# Patient Record
Sex: MALE | Race: WHITE | NOT HISPANIC OR LATINO | Employment: FULL TIME | URBAN - METROPOLITAN AREA
[De-identification: names, ages, dates, MRNs, and addresses within clinical notes are randomized per-mention and may not be internally consistent; named-entity substitution may affect disease eponyms.]

---

## 2017-01-02 DIAGNOSIS — N18.9 CKD (CHRONIC KIDNEY DISEASE): Primary | ICD-10-CM

## 2017-01-02 NOTE — NURSING NOTE
Labs entered per 2A Protocol.   Labs entered for 4 month follow up CKD   Last OV: 8.25.2016    Char Haas LPN

## 2017-01-18 ENCOUNTER — OFFICE VISIT (OUTPATIENT)
Dept: NEPHROLOGY | Facility: CLINIC | Age: 52
End: 2017-01-18
Attending: INTERNAL MEDICINE
Payer: COMMERCIAL

## 2017-01-18 VITALS
BODY MASS INDEX: 29.18 KG/M2 | HEIGHT: 74 IN | TEMPERATURE: 98.3 F | OXYGEN SATURATION: 98 % | WEIGHT: 227.4 LBS | SYSTOLIC BLOOD PRESSURE: 121 MMHG | DIASTOLIC BLOOD PRESSURE: 80 MMHG | HEART RATE: 80 BPM

## 2017-01-18 DIAGNOSIS — Z76.82 ORGAN TRANSPLANT CANDIDATE: ICD-10-CM

## 2017-01-18 DIAGNOSIS — E87.20 ACIDOSIS: Primary | ICD-10-CM

## 2017-01-18 DIAGNOSIS — N18.9 CKD (CHRONIC KIDNEY DISEASE): ICD-10-CM

## 2017-01-18 DIAGNOSIS — N18.5 CKD (CHRONIC KIDNEY DISEASE) STAGE 5, GFR LESS THAN 15 ML/MIN (H): ICD-10-CM

## 2017-01-18 DIAGNOSIS — N25.81 SECONDARY RENAL HYPERPARATHYROIDISM (H): ICD-10-CM

## 2017-01-18 LAB
ALBUMIN SERPL-MCNC: 3.8 G/DL (ref 3.4–5)
ANION GAP SERPL CALCULATED.3IONS-SCNC: 12 MMOL/L (ref 3–14)
BUN SERPL-MCNC: 76 MG/DL (ref 7–30)
CALCIUM SERPL-MCNC: 8.8 MG/DL (ref 8.5–10.1)
CHLORIDE SERPL-SCNC: 110 MMOL/L (ref 94–109)
CO2 SERPL-SCNC: 18 MMOL/L (ref 20–32)
CREAT SERPL-MCNC: 5.49 MG/DL (ref 0.66–1.25)
CREAT UR-MCNC: 57 MG/DL
DEPRECATED CALCIDIOL+CALCIFEROL SERPL-MC: 19 UG/L (ref 20–75)
ERYTHROCYTE [DISTWIDTH] IN BLOOD BY AUTOMATED COUNT: 12.8 % (ref 10–15)
FERRITIN SERPL-MCNC: 274 NG/ML (ref 26–388)
GFR SERPL CREATININE-BSD FRML MDRD: 11 ML/MIN/1.7M2
GLUCOSE SERPL-MCNC: 102 MG/DL (ref 70–99)
HCT VFR BLD AUTO: 31.8 % (ref 40–53)
HGB BLD-MCNC: 11 G/DL (ref 13.3–17.7)
IRON SATN MFR SERPL: 22 % (ref 15–46)
IRON SERPL-MCNC: 68 UG/DL (ref 35–180)
MCH RBC QN AUTO: 31.8 PG (ref 26.5–33)
MCHC RBC AUTO-ENTMCNC: 34.6 G/DL (ref 31.5–36.5)
MCV RBC AUTO: 92 FL (ref 78–100)
PHOSPHATE SERPL-MCNC: 5.5 MG/DL (ref 2.5–4.5)
PLATELET # BLD AUTO: 212 10E9/L (ref 150–450)
POTASSIUM SERPL-SCNC: 5.3 MMOL/L (ref 3.4–5.3)
PROT UR-MCNC: 0.35 G/L
PROT/CREAT 24H UR: 0.61 G/G CR (ref 0–0.2)
PTH-INTACT SERPL-MCNC: 279 PG/ML (ref 12–72)
RBC # BLD AUTO: 3.46 10E12/L (ref 4.4–5.9)
SODIUM SERPL-SCNC: 141 MMOL/L (ref 133–144)
TIBC SERPL-MCNC: 306 UG/DL (ref 240–430)
WBC # BLD AUTO: 6.4 10E9/L (ref 4–11)

## 2017-01-18 PROCEDURE — 82728 ASSAY OF FERRITIN: CPT | Performed by: INTERNAL MEDICINE

## 2017-01-18 PROCEDURE — 83540 ASSAY OF IRON: CPT | Performed by: INTERNAL MEDICINE

## 2017-01-18 PROCEDURE — 83970 ASSAY OF PARATHORMONE: CPT | Performed by: INTERNAL MEDICINE

## 2017-01-18 PROCEDURE — 84156 ASSAY OF PROTEIN URINE: CPT | Performed by: INTERNAL MEDICINE

## 2017-01-18 PROCEDURE — 80069 RENAL FUNCTION PANEL: CPT | Performed by: INTERNAL MEDICINE

## 2017-01-18 PROCEDURE — 82306 VITAMIN D 25 HYDROXY: CPT | Performed by: INTERNAL MEDICINE

## 2017-01-18 PROCEDURE — 83550 IRON BINDING TEST: CPT | Performed by: INTERNAL MEDICINE

## 2017-01-18 PROCEDURE — 85027 COMPLETE CBC AUTOMATED: CPT | Performed by: INTERNAL MEDICINE

## 2017-01-18 PROCEDURE — 99213 OFFICE O/P EST LOW 20 MIN: CPT | Mod: ZF

## 2017-01-18 PROCEDURE — 36415 COLL VENOUS BLD VENIPUNCTURE: CPT | Performed by: INTERNAL MEDICINE

## 2017-01-18 RX ORDER — SODIUM BICARBONATE 650 MG/1
1300 TABLET ORAL 2 TIMES DAILY
Qty: 120 TABLET | Refills: 11 | Status: SHIPPED | OUTPATIENT
Start: 2017-01-18 | End: 2017-05-25

## 2017-01-18 ASSESSMENT — PAIN SCALES - GENERAL: PAINLEVEL: NO PAIN (0)

## 2017-01-18 NOTE — Clinical Note
"1/18/2017      RE: Cesar Horowitzlilibethjavier  [address] [place] [address]       ASSESSMENT AND RECOMMENDATIONS:    51 year old male with history of CKD noted over last few years, now stage 4/5, of unclear etiology. Also has a brother now on dialysis. He has history of gross hematuria a few years ago.    1.  CKD stage 4/5 with minimal proteinuria- not sure if he would be interested in PD or HD, he has not really thought about RRT, and hopes to have donors though it seems he has had almost 30 people , none of them cleared. He has been on waiting list since October 2014, so hoping to 'bide his time'.  - discussed today and he is leaning toward PD (does not like idea of needles or fistula)  - will have him get more education on PD -   2.  Hypertension: at goal, on lisinopril.  3.  History of remote episode of gross hematuria in 2002. ? IgA- though intriguing that his brother and a couple other family members have renal failure.    - he does take fish oil  4.  Anemia in chronic kidney disease; he does not require any erythropoiesis stimulating agents. Hemoglobin >11 mg/dL  5. Secondary hyperparathyroidism: PTH at goal for his CKD stage, on calcitriol and cholecalciferol 2000mg daily- may need to increase dose  6. Vitamin D deficiency- on calcitriol and cholecalciferol, states he did not improve on ergocalciferol  7. Metabolic acidosis- had stopped taking his bicarbonate tabs, will restart given his bicarbonate is quite low.  - return to clinic in 4-25 months, labs mid point to be done    REASON FOR VISIT:      The patient is here for followup on advanced chronic kidney disease, stage 5.     HISTORY OF PRESENT ILLNESS:      He is a pleasant 51 year-old male with CKD previously followed by Dr Taylor. He is fairly healthy other than kidney disease.  It is unclear what the cause of kidney failure is but suspect it may be IgA given that he had a gross hematuria episode in 2002, which was thought to be a \" kidney infection\" " by a doctor in [place], and he was given an antibiotic with resolution of the hematuria.  A while later his Scr was noted to be higher by his primary care physician in [place].   In 2007, he was told that his GFR was around 59, then in 2010, his creatinine was up to 1.8 mg/dl.  He was referred to a nephrologist who told him that he had mild kidney dysfunction.  He did not comprehend the seriousness of this and felt reassured, thus he did not follow closely.   He was started on lisinopril 5 mg for mild hypertension. He did not undergo further workup and does not remember being offered a kidney biopsy.   In November i2011 he was seen by his nephrologist and his serum creatinine was slightly higher in the range of 2 with an estimated GFR of 33. In May 2012, he had a decline of his GFR per him to 29 mL per minute  There was no clear cause for this. He does not take NSAIDs, have  history of kidney stones or any indication of a system or autoimmune process.  His brother has signfiicant kidney failure and has recently started dialysis (GFR of 3 apparently). He has a maternal aunt who is diabetic and has kidney failure in her 60s.  Also, he has a cousin who is overweight and diabetic who has kidney problems, but no overt or known genetic disease.    Since last visit he continues to work full time but does complain of significant fatigue. However, he is able to be active and do the things he wants to do.    His eGFR is stable today from recent values, as is his hemoglobin (11's). He takes lisinopril for blood pressure and his pressures are well controlled.  Though he has had numerous donors submitted, no actual donor is secured at this time.  He has acidosis and is on bicarbonate, taking 2-3 pills a day  He works for [employer]. He has gained a few pounds due to less walking at new office location          PAST MEDICAL HISTORY:      chronic kidney disease  Gross hematuria in 2002  Hyperlipidemia  hypertension.  "      PAST SURGICAL HISTORY:      abdominal hernia repair at age 3      FAMILY HISTORY:      Reviewed.  Maternal aunt is diabetic and she has kidney disease that is likely related to diabetes mellitus.  His cousin also has diabetes and kidney disease.   His brother has kidney failure    SOCIAL HISTORY:      No current tobacco use.   Moderate alcohol intake    ROS:     A comprehensive review of systems was obtained and negative.    Personal Hx:   Social History     Social History     Marital Status:      Spouse Name: mike     Number of Children: 1     Years of Education: N/A     Occupational History      [employer]     Social History Main Topics     Smoking status: Never Smoker      Smokeless tobacco: Not on file     Alcohol Use: 0.0 oz/week     0 Standard drinks or equivalent per week      Comment: 2 beers monthly     Drug Use: No     Sexual Activity: Yes     Other Topics Concern     Exercise No     Social History Narrative       Allergies:  Allergies   Allergen Reactions     Nsaids      Swelling and Hives         Medications:  Current Outpatient Prescriptions   Medication     calcitRIOL (ROCALTROL) 0.5 MCG capsule     lisinopril (PRINIVIL/ZESTRIL) 20 MG tablet     sodium bicarbonate 650 MG tablet     DILT- MG 24 hr ER capsule     calcium carbonate (TUMS) 500 MG chewable tablet     atorvastatin (LIPITOR) 10 MG tablet     ferrous sulfate (IRON) 325 (65 FE) MG tablet     Cholecalciferol (VITAMIN D) 2000 UNITS CAPS     fish oil-omega-3 fatty acids (FISH OIL) 1000 MG capsule     No current facility-administered medications for this visit.     Vitals:    /80 mmHg  Pulse 80  Temp(Src) 98.3  F (36.8  C) (Oral)  Ht 1.88 m (6' 2\")  Wt 103.148 kg (227 lb 6.4 oz)  BMI 29.18 kg/m2  SpO2 98%    Exam:  GENERAL APPEARANCE: alert and no distress  HENT: mouth without ulcers or lesions  LYMPHATICS: no cervical adenopathy  RESP: lungs clear to auscultation  CV: regular rhythm, normal rate, no rub, no " murmur  EDEMA: no LE edema bilaterally  ABDOMEN:  soft, nontender and bowel sounds normal  MSno gross deformities noted  SKIN: no rash  NEURO: grossly non-focal  PSYCH: mentation appears normal and affect normal    Results:  Recent Results (from the past 168 hour(s))   Protein  random urine    Collection Time: 01/18/17  1:02 PM   Result Value Ref Range    Protein Random Urine 0.35 g/L    Protein Total Urine g/gr Creatinine 0.61 (H) 0 - 0.2 g/g Cr   Creatinine urine calculation only    Collection Time: 01/18/17  1:02 PM   Result Value Ref Range    Creatinine Urine 57 mg/dL   CBC with platelets    Collection Time: 01/18/17  1:06 PM   Result Value Ref Range    WBC 6.4 4.0 - 11.0 10e9/L    RBC Count 3.46 (L) 4.4 - 5.9 10e12/L    Hemoglobin 11.0 (L) 13.3 - 17.7 g/dL    Hematocrit 31.8 (L) 40.0 - 53.0 %    MCV 92 78 - 100 fl    MCH 31.8 26.5 - 33.0 pg    MCHC 34.6 31.5 - 36.5 g/dL    RDW 12.8 10.0 - 15.0 %    Platelet Count 212 150 - 450 10e9/L       Component      Latest Ref Rng 12/14/2015 2/23/2016 3/14/2016 8/15/2016   Color Urine       Light Yellow      Appearance Urine       Clear      Glucose Urine      NEG mg/dL Negative      Bilirubin Urine      NEG Negative      Ketones Urine      NEG mg/dL Negative      Specific Gravity Urine      1.003 - 1.035 1.008      Blood Urine      NEG Negative      pH Urine      5.0 - 7.0 pH 5.5      Protein Albumin Urine      NEG mg/dL 10 (A)      Urobilinogen mg/dL      0.0 - 2.0 mg/dL Normal      Nitrite Urine      NEG Negative      Leukocyte Esterase Urine      NEG Negative      Source       Urine      WBC Urine      0 - 2 /HPF <1      RBC Urine      0 - 2 /HPF <1      Renal Tub Epi      NEG /HPF <1 (A)      Mucous Urine      NEG /LPF Present (A)      Sodium      133 - 144 mmol/L 141 138 141 142   Potassium      3.4 - 5.3 mmol/L 4.6 4.8 5.1 4.9   Chloride      94 - 109 mmol/L 112 (H) 109 112 (H) 116 (H)   Carbon Dioxide      20 - 32 mmol/L 18 (L) 21 19 (L) 14 (L)   Anion Gap      3 -  14 mmol/L 11 8 10 12   Glucose      70 - 99 mg/dL 91 89 105 (H) 90   Urea Nitrogen      7 - 30 mg/dL 60 (H) 54 (H) 54 (H) 64 (H)   Creatinine      0.66 - 1.25 mg/dL 4.65 (H) 4.30 (H) 4.55 (H) 4.58 (H)   GFR Estimate      >60 mL/min/1.7m2 13 (L) 15 (L) 14 (L) 13 (L)   GFR Estimate If Black      >60 mL/min/1.7m2 16 (L) 18 (L) 16 (L) 16 (L)   Calcium      8.5 - 10.1 mg/dL 8.4 (L) 8.6 9.1 8.6   Phosphorus      2.5 - 4.5 mg/dL 4.1  3.7 3.9   Albumin      3.4 - 5.0 g/dL 3.7  3.8 3.7   Iron      35 - 180 ug/dL   81    Iron Binding Cap      240 - 430 ug/dL   310    Iron Saturation Index      15 - 46 %   26    Protein Random Urine         0.43    Protein Total Urine g/gr Creatinine      0 - 0.2 g/g Cr   0.63 (H)    Hemoglobin      13.3 - 17.7 g/dL 11.1 (L)  11.3 (L) 10.6 (L)   Parathyroid Hormone Intact      12 - 72 pg/mL   157 (H)    Ferritin      26 - 388 ng/mL   246    Vitamin D Deficiency screening      20 - 75 ug/L   26    Creatinine Urine         69              Asya Philippe Perez MD

## 2017-01-18 NOTE — NURSING NOTE
"Chief Complaint   Patient presents with     RECHECK     4 mo Ckd follow up       Initial /80 mmHg  Pulse 80  Temp(Src) 98.3  F (36.8  C) (Oral)  Ht 1.88 m (6' 2\")  Wt 103.148 kg (227 lb 6.4 oz)  BMI 29.18 kg/m2  SpO2 98% Estimated body mass index is 29.18 kg/(m^2) as calculated from the following:    Height as of this encounter: 1.88 m (6' 2\").    Weight as of this encounter: 103.148 kg (227 lb 6.4 oz).  BP completed using cuff size: regular    "

## 2017-01-18 NOTE — PROGRESS NOTES
"ASSESSMENT AND RECOMMENDATIONS:    51 year old male with history of CKD noted over last few years, now stage 4/5, of unclear etiology. Also has a brother now on dialysis. He has history of gross hematuria a few years ago.    1.  CKD stage 4/5 with minimal proteinuria- not sure if he would be interested in PD or HD, he has not really thought about RRT, and hopes to have donors though it seems he has had almost 30 people , none of them cleared. He has been on waiting list since October 2014, so hoping to 'bide his time'.  - discussed today and he is leaning toward PD (does not like idea of needles or fistula)  - will have him get more education on PD -   2.  Hypertension: at goal, on lisinopril.  3.  History of remote episode of gross hematuria in 2002. ? IgA- though intriguing that his brother and a couple other family members have renal failure.    - he does take fish oil  4.  Anemia in chronic kidney disease; he does not require any erythropoiesis stimulating agents. Hemoglobin >11 mg/dL  5. Secondary hyperparathyroidism: PTH at goal for his CKD stage, on calcitriol and cholecalciferol 2000mg daily- may need to increase dose  6. Vitamin D deficiency- on calcitriol and cholecalciferol, states he did not improve on ergocalciferol  7. Metabolic acidosis- had stopped taking his bicarbonate tabs, will restart given his bicarbonate is quite low.  - return to clinic in 4-25 months, labs mid point to be done    REASON FOR VISIT:      The patient is here for followup on advanced chronic kidney disease, stage 5.     HISTORY OF PRESENT ILLNESS:      He is a pleasant 51 year-old male with CKD previously followed by Dr Taylor. He is fairly healthy other than kidney disease.  It is unclear what the cause of kidney failure is but suspect it may be IgA given that he had a gross hematuria episode in 2002, which was thought to be a \" kidney infection\" by a doctor in [place], and he was given an antibiotic with resolution of the " hematuria.  A while later his Scr was noted to be higher by his primary care physician in [place].   In 2007, he was told that his GFR was around 59, then in 2010, his creatinine was up to 1.8 mg/dl.  He was referred to a nephrologist who told him that he had mild kidney dysfunction.  He did not comprehend the seriousness of this and felt reassured, thus he did not follow closely.   He was started on lisinopril 5 mg for mild hypertension. He did not undergo further workup and does not remember being offered a kidney biopsy.   In November i2011 he was seen by his nephrologist and his serum creatinine was slightly higher in the range of 2 with an estimated GFR of 33. In May 2012, he had a decline of his GFR per him to 29 mL per minute  There was no clear cause for this. He does not take NSAIDs, have  history of kidney stones or any indication of a system or autoimmune process.  His brother has signfiicant kidney failure and has recently started dialysis (GFR of 3 apparently). He has a maternal aunt who is diabetic and has kidney failure in her 60s.  Also, he has a cousin who is overweight and diabetic who has kidney problems, but no overt or known genetic disease.    Since last visit he continues to work full time but does complain of significant fatigue. However, he is able to be active and do the things he wants to do.    His eGFR is stable today from recent values, as is his hemoglobin (11's). He takes lisinopril for blood pressure and his pressures are well controlled.  Though he has had numerous donors submitted, no actual donor is secured at this time.  He has acidosis and is on bicarbonate, taking 2-3 pills a day  He works for [employer]. He has gained a few pounds due to less walking at new office location          PAST MEDICAL HISTORY:      chronic kidney disease  Gross hematuria in 2002  Hyperlipidemia  hypertension.       PAST SURGICAL HISTORY:      abdominal hernia repair at age  "3      FAMILY HISTORY:      Reviewed.  Maternal aunt is diabetic and she has kidney disease that is likely related to diabetes mellitus.  His cousin also has diabetes and kidney disease.   His brother has kidney failure    SOCIAL HISTORY:      No current tobacco use.   Moderate alcohol intake    ROS:     A comprehensive review of systems was obtained and negative.    Personal Hx:   Social History     Social History     Marital Status:      Spouse Name: mike     Number of Children: 1     Years of Education: N/A     Occupational History      [employer]     Social History Main Topics     Smoking status: Never Smoker      Smokeless tobacco: Not on file     Alcohol Use: 0.0 oz/week     0 Standard drinks or equivalent per week      Comment: 2 beers monthly     Drug Use: No     Sexual Activity: Yes     Other Topics Concern     Exercise No     Social History Narrative       Allergies:  Allergies   Allergen Reactions     Nsaids      Swelling and Hives         Medications:  Current Outpatient Prescriptions   Medication     calcitRIOL (ROCALTROL) 0.5 MCG capsule     lisinopril (PRINIVIL/ZESTRIL) 20 MG tablet     sodium bicarbonate 650 MG tablet     DILT- MG 24 hr ER capsule     calcium carbonate (TUMS) 500 MG chewable tablet     atorvastatin (LIPITOR) 10 MG tablet     ferrous sulfate (IRON) 325 (65 FE) MG tablet     Cholecalciferol (VITAMIN D) 2000 UNITS CAPS     fish oil-omega-3 fatty acids (FISH OIL) 1000 MG capsule     No current facility-administered medications for this visit.     Vitals:    /80 mmHg  Pulse 80  Temp(Src) 98.3  F (36.8  C) (Oral)  Ht 1.88 m (6' 2\")  Wt 103.148 kg (227 lb 6.4 oz)  BMI 29.18 kg/m2  SpO2 98%    Exam:  GENERAL APPEARANCE: alert and no distress  HENT: mouth without ulcers or lesions  LYMPHATICS: no cervical adenopathy  RESP: lungs clear to auscultation  CV: regular rhythm, normal rate, no rub, no murmur  EDEMA: no LE edema bilaterally  ABDOMEN:  soft, nontender and bowel " sounds normal  MSno gross deformities noted  SKIN: no rash  NEURO: grossly non-focal  PSYCH: mentation appears normal and affect normal    Results:  Recent Results (from the past 168 hour(s))   Protein  random urine    Collection Time: 01/18/17  1:02 PM   Result Value Ref Range    Protein Random Urine 0.35 g/L    Protein Total Urine g/gr Creatinine 0.61 (H) 0 - 0.2 g/g Cr   Creatinine urine calculation only    Collection Time: 01/18/17  1:02 PM   Result Value Ref Range    Creatinine Urine 57 mg/dL   CBC with platelets    Collection Time: 01/18/17  1:06 PM   Result Value Ref Range    WBC 6.4 4.0 - 11.0 10e9/L    RBC Count 3.46 (L) 4.4 - 5.9 10e12/L    Hemoglobin 11.0 (L) 13.3 - 17.7 g/dL    Hematocrit 31.8 (L) 40.0 - 53.0 %    MCV 92 78 - 100 fl    MCH 31.8 26.5 - 33.0 pg    MCHC 34.6 31.5 - 36.5 g/dL    RDW 12.8 10.0 - 15.0 %    Platelet Count 212 150 - 450 10e9/L       Component      Latest Ref Rng 12/14/2015 2/23/2016 3/14/2016 8/15/2016   Color Urine       Light Yellow      Appearance Urine       Clear      Glucose Urine      NEG mg/dL Negative      Bilirubin Urine      NEG Negative      Ketones Urine      NEG mg/dL Negative      Specific Gravity Urine      1.003 - 1.035 1.008      Blood Urine      NEG Negative      pH Urine      5.0 - 7.0 pH 5.5      Protein Albumin Urine      NEG mg/dL 10 (A)      Urobilinogen mg/dL      0.0 - 2.0 mg/dL Normal      Nitrite Urine      NEG Negative      Leukocyte Esterase Urine      NEG Negative      Source       Urine      WBC Urine      0 - 2 /HPF <1      RBC Urine      0 - 2 /HPF <1      Renal Tub Epi      NEG /HPF <1 (A)      Mucous Urine      NEG /LPF Present (A)      Sodium      133 - 144 mmol/L 141 138 141 142   Potassium      3.4 - 5.3 mmol/L 4.6 4.8 5.1 4.9   Chloride      94 - 109 mmol/L 112 (H) 109 112 (H) 116 (H)   Carbon Dioxide      20 - 32 mmol/L 18 (L) 21 19 (L) 14 (L)   Anion Gap      3 - 14 mmol/L 11 8 10 12   Glucose      70 - 99 mg/dL 91 89 105 (H) 90   Urea  Nitrogen      7 - 30 mg/dL 60 (H) 54 (H) 54 (H) 64 (H)   Creatinine      0.66 - 1.25 mg/dL 4.65 (H) 4.30 (H) 4.55 (H) 4.58 (H)   GFR Estimate      >60 mL/min/1.7m2 13 (L) 15 (L) 14 (L) 13 (L)   GFR Estimate If Black      >60 mL/min/1.7m2 16 (L) 18 (L) 16 (L) 16 (L)   Calcium      8.5 - 10.1 mg/dL 8.4 (L) 8.6 9.1 8.6   Phosphorus      2.5 - 4.5 mg/dL 4.1  3.7 3.9   Albumin      3.4 - 5.0 g/dL 3.7  3.8 3.7   Iron      35 - 180 ug/dL   81    Iron Binding Cap      240 - 430 ug/dL   310    Iron Saturation Index      15 - 46 %   26    Protein Random Urine         0.43    Protein Total Urine g/gr Creatinine      0 - 0.2 g/g Cr   0.63 (H)    Hemoglobin      13.3 - 17.7 g/dL 11.1 (L)  11.3 (L) 10.6 (L)   Parathyroid Hormone Intact      12 - 72 pg/mL   157 (H)    Ferritin      26 - 388 ng/mL   246    Vitamin D Deficiency screening      20 - 75 ug/L   26    Creatinine Urine         69            Answers for HPI/ROS submitted by the patient on 3/1/2016   General Symptoms: No  Skin Symptoms: No  HENT Symptoms: No  EYE SYMPTOMS: No  HEART SYMPTOMS: No  LUNG SYMPTOMS: No  INTESTINAL SYMPTOMS: No  URINARY SYMPTOMS: No  REPRODUCTIVE SYMPTOMS: No  SKELETAL SYMPTOMS: No  BLOOD SYMPTOMS: No  NERVOUS SYSTEM SYMPTOMS: No  MENTAL HEALTH SYMPTOMS: No      Answers for HPI/ROS submitted by the patient on 1/16/2017   General Symptoms: No  Skin Symptoms: No  HENT Symptoms: No  EYE SYMPTOMS: No  HEART SYMPTOMS: No  LUNG SYMPTOMS: No  INTESTINAL SYMPTOMS: No  URINARY SYMPTOMS: No  REPRODUCTIVE SYMPTOMS: No  SKELETAL SYMPTOMS: No  BLOOD SYMPTOMS: No  NERVOUS SYSTEM SYMPTOMS: No  MENTAL HEALTH SYMPTOMS: No

## 2017-01-19 ENCOUNTER — CARE COORDINATION (OUTPATIENT)
Dept: NEPHROLOGY | Facility: CLINIC | Age: 52
End: 2017-01-19

## 2017-01-19 NOTE — PROGRESS NOTES
Called patient and discussed referral to [facility] for RRT education. Patient would like to attend the class here next week 1/25. Referral faxed to [facility].    Amaury Vazquez RN

## 2017-01-23 DIAGNOSIS — N18.30 CKD (CHRONIC KIDNEY DISEASE) STAGE 3, GFR 30-59 ML/MIN (H): Primary | ICD-10-CM

## 2017-01-23 DIAGNOSIS — N18.3 CKD (CHRONIC KIDNEY DISEASE), STAGE 3 (MODERATE): ICD-10-CM

## 2017-01-23 RX ORDER — CALCITRIOL 0.5 UG/1
1 CAPSULE, LIQUID FILLED ORAL DAILY
Qty: 30 CAPSULE | Refills: 11 | Status: SHIPPED | OUTPATIENT
Start: 2017-01-23 | End: 2017-03-08

## 2017-01-25 ENCOUNTER — ALLIED HEALTH/NURSE VISIT (OUTPATIENT)
Dept: TRANSPLANT | Facility: CLINIC | Age: 52
End: 2017-01-25
Attending: INTERNAL MEDICINE
Payer: COMMERCIAL

## 2017-01-25 DIAGNOSIS — N18.5 CKD (CHRONIC KIDNEY DISEASE) STAGE 5, GFR LESS THAN 15 ML/MIN (H): Primary | ICD-10-CM

## 2017-01-25 NOTE — NURSING NOTE
Patient came for RRT education through [facility]. Nephrology flow sheet updated.    Amaury Vazquez RN

## 2017-02-03 ENCOUNTER — CARE COORDINATION (OUTPATIENT)
Dept: NEPHROLOGY | Facility: CLINIC | Age: 52
End: 2017-02-03

## 2017-02-03 NOTE — PROGRESS NOTES
Reason for Call    Called patient to follow up after he attended [facility] last week. Patient has decided on PD if he requires dialysis prior to transplant. Will go ahead and refer patient for surgical PD consult, but he does not need the catheter placed yet. Patient agreeable to plan.    Collaboration    Above discussed with Dr. Perez    Plan    1. Referral for PD consult    Patient was given an opportunity to ask questions and have those questions answered to his satisfaction.  Patient verbalized understanding of instructions provided and agreed to plan of care.    Amaury Vazquez RN, BAN  Nephrology RN Care Coordinator     Phone: [phone]  Pager: [phone]

## 2017-03-01 ENCOUNTER — ORGAN (OUTPATIENT)
Dept: TRANSPLANT | Facility: CLINIC | Age: 52
End: 2017-03-01

## 2017-03-02 NOTE — TELEPHONE ENCOUNTER
Organ Offer Encounter Information    Organ Offer Information   Organ offer date & time:  3/1/2017  5:44 PM   Coordinator/Fellow/Attending name:  VAHE AGUIRRE   Organ(s):   Organ UNOS ID Match Run ID Comment Organ Laterality   Kidney [id number]           Recent infections?:  No    New medications?:  No Recent pregnancy?:  (Comment: NA)   Angicoagulation medications?:  No Recent vaccinations?:  No   Recent blood transfusions?:  No Recent hospitalizations?:  No   Has your insurance changed in the last 6-12 months?:  Neg    Discussed risk category with Patient/Other:  DCD, [employer] Risk   Understood donor criteria, verbalized understanding   Discussed program-specific outcomes:  Did not have questions regarding SRTR   Right to decline organ offer without penalty, Patient/Other:  Aware of option to decline without penalty   Organ offer decision status Patient/Other:  Accepted Offer   Additional Comments:  5:44 PM  March 1, 2017  Reviewed backup kidney offer with Mr. Villalobos.  He has agreed to accept the offer if it comes to him. Will call him once more information is available.  Vahe Aguirre RN, CCTC  On Call Organ Coordinator    8:27 PM  March 1, 2017  Notified Mr. Min that there was a delay and I would call him again about midnight.  He expressed understanding.  Vahe Aguirre RN, CCTC  On Call Organ Coordinator    12:37 AM  March 2, 2017  Notified Mr. Villalobos that he remains backup for the kidney offer. Per Dr. Vieira, will be NPO.  Will call him about 6:00 with determination.    Vahe Aguirre RN, CCTC  On Call Organ Coordinator    6:24 AM  March 2, 2017  Notified Mr. Villalobos that the organ was accepted by the center ahead of him.  He expressed understanding.  Vahe Aguirre RN, CCTC  On Call Organ Coordinator       Attestation I have discussed all of the above with the Patient/Legal Guardian/Caregiver regarding this organ offer.:  Yes   Coordinator/Fellow/Attending name:  VAHE AGUIRRE

## 2017-03-08 ENCOUNTER — CARE COORDINATION (OUTPATIENT)
Dept: NEPHROLOGY | Facility: CLINIC | Age: 52
End: 2017-03-08

## 2017-03-08 DIAGNOSIS — N18.3 CKD (CHRONIC KIDNEY DISEASE), STAGE 3 (MODERATE): ICD-10-CM

## 2017-03-08 DIAGNOSIS — N18.30 CKD (CHRONIC KIDNEY DISEASE) STAGE 3, GFR 30-59 ML/MIN (H): ICD-10-CM

## 2017-03-08 RX ORDER — CALCITRIOL 0.5 UG/1
1 CAPSULE, LIQUID FILLED ORAL DAILY
Qty: 180 CAPSULE | Refills: 3 | Status: SHIPPED | OUTPATIENT
Start: 2017-03-08 | End: 2017-06-29

## 2017-03-08 NOTE — PROGRESS NOTES
Reason for Call    Called patient to follow up and screen for uremic symptoms.     Patient says he is doing really well. Denies any new uremic symptoms or concerns. Denies chest pain or shortness for breath.     Regarding ESRD planning, patient had a PD consult appointment last week that he no-showed. Patient says he completely forgot about it because it wasn't on his schedule. Discussed getting the appointment rescheduled. Patient would like to hold off for now. He got a call for a back-up kidney offer last week, so he is hopeful about getting a kidney transplant before needing dialysis. Explained to patient that this is our hope as well, but we do want to prepare for the possibility that he will need dialysis. Informed patient there is no urgency to having the PD consult done, so he will wait and see how things are going over the next couple of months. He will also call his transplant coordinator to touch base.     Collaboration     Dr. Perez updated.    Patient Education    1. Uremic symptoms and when to call the clinic     Plan    1. Will follow up with patient in about 1 month to check in and see how he is feeling; patient advised to call sooner with any questions or concerns     Patient was given an opportunity to ask questions and have those questions answered to his satisfaction.  Patient verbalized understanding of instructions provided and agreed to plan of care.    Amaury Vazquez, RN, BAN  Nephrology RN Care Coordinator

## 2017-03-21 ENCOUNTER — TELEPHONE (OUTPATIENT)
Dept: TRANSPLANT | Facility: CLINIC | Age: 52
End: 2017-03-21

## 2017-03-21 NOTE — TELEPHONE ENCOUNTER
Pt called with post-transplant questions. Reviewed all of pt's questions. Pt verbalized understanding.

## 2017-03-29 ENCOUNTER — TRANSFERRED RECORDS (OUTPATIENT)
Dept: HEALTH INFORMATION MANAGEMENT | Facility: CLINIC | Age: 52
End: 2017-03-29

## 2017-03-31 ENCOUNTER — MEDICAL CORRESPONDENCE (OUTPATIENT)
Dept: TRANSPLANT | Facility: CLINIC | Age: 52
End: 2017-03-31

## 2017-04-07 ENCOUNTER — CARE COORDINATION (OUTPATIENT)
Dept: NEPHROLOGY | Facility: CLINIC | Age: 52
End: 2017-04-07

## 2017-04-07 NOTE — PROGRESS NOTES
Reason for Call    Called patient to check in. We last talked about a month ago. He had missed a consult to meet with a surgeon about PD catheter, but decided to hold off on rescheduling- he did not have any uremic symptoms and he had recently had a couple of calls for Kidney/ panc offers that fell through. Patient wanted to see if he got more offers.    Since then, patient has developed some uremic symptoms. Reports itchy skin, hard time sleeping, and increased fatigue. He is still working full time, but it is becoming more difficult for him to concentrate. He is also restless. Denies decreased appetite, nausea, or vomiting- still eating regular sized meals. No chest pain or difficulty breathing or increased edema.     He has not had labs drawn since January, so is due to repeat those. He is scheduled for labs on 4/28, but will get them sooner if needed.     Since patient is now having some uremic symptoms, we discussed moving forward with PD consult, which patient is agreeable to.     Collaboration    Dr. Perez updated. She recommends that patient try to get labs done since he is symptomatic.    Plan    1. Patient will get labs done this week  2. PD cath consult scheduled for 4/24 with Dr. Tay    Patient was given an opportunity to ask questions and have those questions answered to his satisfaction.  Patient verbalized understanding of instructions provided and agreed to plan of care.    Amaury Vazquez, RN, BAN  Nephrology RN Care Coordinator

## 2017-04-26 ENCOUNTER — CARE COORDINATION (OUTPATIENT)
Dept: NEPHROLOGY | Facility: CLINIC | Age: 52
End: 2017-04-26

## 2017-04-26 NOTE — PROGRESS NOTES
"Reason for Call    Called patient to check in. He missed his PD cath consult with Dr. Tay yesterday.     He said he forgot to roel it on his calendar and also things he's having a \"mental block\" because he does not want to start dialysis- was really hoping to get a transplant before requiring dialysis. He did get a couple of calls for an available organ, but these fell through and he has not received another call in a couple of months.    Patient is scheduled to get labs drawn on Friday, so would like to see what his kidney function looks like. If labs are worse that they were in January, he will move forward with PD consult/ placement.    At this time, patient does report uremic symptoms- fatigue, sleep disturbance, itchiness, and brain fog. Reports symptoms are \"manageable\" right now. Denies nausea, vomiting, decreased appetite, shortness of breath, or chest pain.     Collaboration    Dr. Perez updated.    Plan    1. Will follow up with patient early next week to review labs and continue discussion about dialysis planning    Patient was given an opportunity to ask questions and have those questions answered to his satisfaction.  Patient verbalized understanding of instructions provided and agreed to plan of care.    Amaury Vazquez, RN, BAN  Nephrology RN Care Coordinator             "

## 2017-04-28 ENCOUNTER — RESULTS ONLY (OUTPATIENT)
Dept: OTHER | Facility: CLINIC | Age: 52
End: 2017-04-28

## 2017-04-28 DIAGNOSIS — N18.5 CKD (CHRONIC KIDNEY DISEASE) STAGE 5, GFR LESS THAN 15 ML/MIN (H): ICD-10-CM

## 2017-04-28 DIAGNOSIS — Z76.82 ORGAN TRANSPLANT CANDIDATE: ICD-10-CM

## 2017-04-28 DIAGNOSIS — N18.6 END STAGE RENAL DISEASE (H): ICD-10-CM

## 2017-04-28 LAB
ALBUMIN SERPL-MCNC: 3.6 G/DL (ref 3.4–5)
ANION GAP SERPL CALCULATED.3IONS-SCNC: 11 MMOL/L (ref 3–14)
BUN SERPL-MCNC: 105 MG/DL (ref 7–30)
CALCIUM SERPL-MCNC: 9.7 MG/DL (ref 8.5–10.1)
CHLORIDE SERPL-SCNC: 108 MMOL/L (ref 94–109)
CO2 SERPL-SCNC: 19 MMOL/L (ref 20–32)
CREAT SERPL-MCNC: 7.59 MG/DL (ref 0.66–1.25)
CREAT UR-MCNC: 56 MG/DL
DEPRECATED CALCIDIOL+CALCIFEROL SERPL-MC: 29 UG/L (ref 20–75)
ERYTHROCYTE [DISTWIDTH] IN BLOOD BY AUTOMATED COUNT: 13 % (ref 10–15)
GFR SERPL CREATININE-BSD FRML MDRD: 8 ML/MIN/1.7M2
GLUCOSE SERPL-MCNC: 98 MG/DL (ref 70–99)
HCT VFR BLD AUTO: 27.1 % (ref 40–53)
HGB BLD-MCNC: 8.8 G/DL (ref 13.3–17.7)
MCH RBC QN AUTO: 30.2 PG (ref 26.5–33)
MCHC RBC AUTO-ENTMCNC: 32.5 G/DL (ref 31.5–36.5)
MCV RBC AUTO: 93 FL (ref 78–100)
PHOSPHATE SERPL-MCNC: 5.3 MG/DL (ref 2.5–4.5)
PLATELET # BLD AUTO: 193 10E9/L (ref 150–450)
POTASSIUM SERPL-SCNC: 4.4 MMOL/L (ref 3.4–5.3)
PROT UR-MCNC: 0.39 G/L
PROT/CREAT 24H UR: 0.69 G/G CR (ref 0–0.2)
PTH-INTACT SERPL-MCNC: 62 PG/ML (ref 12–72)
RBC # BLD AUTO: 2.91 10E12/L (ref 4.4–5.9)
SODIUM SERPL-SCNC: 138 MMOL/L (ref 133–144)
WBC # BLD AUTO: 5.8 10E9/L (ref 4–11)

## 2017-05-01 ENCOUNTER — CARE COORDINATION (OUTPATIENT)
Dept: NEPHROLOGY | Facility: CLINIC | Age: 52
End: 2017-05-01

## 2017-05-01 DIAGNOSIS — N18.5 CKD (CHRONIC KIDNEY DISEASE) STAGE 5, GFR LESS THAN 15 ML/MIN (H): ICD-10-CM

## 2017-05-01 DIAGNOSIS — D63.1 ANEMIA IN STAGE 5 CHRONIC KIDNEY DISEASE (H): Primary | ICD-10-CM

## 2017-05-01 DIAGNOSIS — N18.5 ANEMIA IN STAGE 5 CHRONIC KIDNEY DISEASE (H): Primary | ICD-10-CM

## 2017-05-01 LAB
GBM IGG SER IA-ACNC: NORMAL AI (ref 0–0.9)
PRA SINGLE ANTIGEN IGG ANTIBODY: NORMAL

## 2017-05-01 NOTE — PROGRESS NOTES
Reason for Call    CKD 5: Called Cesar to review labs. Creatinine has increased from 5.5 to 7.5. Patient is uremic- increased fatigue, itchy skin, difficulty sleeping, trouble concentrating. He does still have a fairly good appetite, no nausea or vomiting. No chest pain or shortness of breath.     Dialysis planning: Cesar was hopeful about getting a transplant before requiring dialysis, but understands that he needs to move forward with getting a PD catheter due to his current labs and symptoms. Message sent to dialysis  to reschedule PD consult.    Hemoglobin: Down to 8.8. Referral placed to anemia services    Bicarb: Still low at 19. Patient is supposed to be taking sodium bicarb 1300MG BID, but says he often misses his second dose- said he will start taking his second dose.     Collaboration    Dr. Perez updated. I will follow up with patient if there are any further changes or recommendations.     Patient was given an opportunity to ask questions and have those questions answered to his satisfaction.  Patient verbalized understanding of instructions provided and agreed to plan of care.    Amaury Vazquez, RN, BAN  Nephrology RN Care Coordinator

## 2017-05-02 ENCOUNTER — TELEPHONE (OUTPATIENT)
Dept: PHARMACY | Facility: CLINIC | Age: 52
End: 2017-05-02

## 2017-05-02 DIAGNOSIS — N18.5 CKD (CHRONIC KIDNEY DISEASE) STAGE 5, GFR LESS THAN 15 ML/MIN (H): Primary | ICD-10-CM

## 2017-05-02 DIAGNOSIS — N18.5 ANEMIA IN STAGE 5 CHRONIC KIDNEY DISEASE (H): ICD-10-CM

## 2017-05-02 DIAGNOSIS — D63.1 ANEMIA IN STAGE 5 CHRONIC KIDNEY DISEASE (H): ICD-10-CM

## 2017-05-02 NOTE — TELEPHONE ENCOUNTER
Anemia Management Note - Enrollment  SUBJECTIVE/OBJECTIVE:  Patient called today for enrollment in Anemia Management Service.  Referred by Dr. Asya Perez on 5/2/2017.    Primary Diagnosis: Anemia in Chronic Kidney Disease (N18.5, D63.1)     Secondary Diagnosis:  Chronic Kidney Disease, Stage 5 (N18.5)  Hgb goal range:  9-10  Epo/Darbo: None  Iron regimen:  Ferrous Sulfate  once daily  Recent SHERRY use, transfusion, IV iron: None  No history of stroke, MI and blood clots or cancers    Anemia Latest Ref Rng & Units 12/14/2015 3/14/2016 8/15/2016 11/5/2016 12/8/2016 1/18/2017 4/28/2017   Hemoglobin 13.3 - 17.7 g/dL 11.1(L) 11.3(L) 10.6(L) 11.1(L) 11.2(L) 11.0(L) 8.8(L)   TSAT 15 - 46 % - 26 - - - 22 -   Ferritin 26 - 388 ng/mL - 246 - - - 274 -     BP Readings from Last 3 Encounters:   01/18/17 121/80   08/25/16 116/75   03/14/16 124/81     Wt Readings from Last 2 Encounters:   01/18/17 227 lb 6.4 oz (103.1 kg)   08/30/16 222 lb 4.8 oz (100.8 kg)     Current Outpatient Prescriptions   Medication Sig Dispense Refill     calcitRIOL (ROCALTROL) 0.5 MCG capsule Take 2 capsules (1 mcg) by mouth daily 180 capsule 3     sodium bicarbonate 650 MG tablet Take 2 tablets (1,300 mg) by mouth 2 times daily 120 tablet 11     lisinopril (PRINIVIL/ZESTRIL) 20 MG tablet TAKE 1 TABLET BY MOUTH DAILY 90 tablet 3     DILT- MG 24 hr ER capsule TAKE 1 CAPSULE BY MOUTH EVERY DAY 90 capsule 3     calcium carbonate (TUMS) 500 MG chewable tablet Take 2 chew tab by mouth daily       atorvastatin (LIPITOR) 10 MG tablet Take 1 tablet (10 mg) by mouth daily 90 tablet 3     ferrous sulfate (IRON) 325 (65 FE) MG tablet Take 1 tablet (325 mg) by mouth daily (with breakfast) 100 tablet 3     Cholecalciferol (VITAMIN D) 2000 UNITS CAPS Take 4,000 Units by mouth daily        fish oil-omega-3 fatty acids (FISH OIL) 1000 MG capsule Take 1 capsule by mouth daily.       ASSESSMENT:  Hgb Not at goal   Ferritin: Due for labs  TSat: Due for  labs    PLAN:  Discussed:  anemia overview, monitoring service and goal hemoglobin range and rationale and risks of SHERRY blood clots, stroke and increase in blood pressure  Send med guide if SHERRY started with welcome guide next week    Labs: [facility]    He will have hgb and iron studies 5/8    Next call date:  5/9    Patient verbalized understanding of the plan.     Anemia Management Service  Deb Zamora,LilyD and Pippa Giron CPhT  Phone: [phone]  Fax: [phone]

## 2017-05-05 ENCOUNTER — CARE COORDINATION (OUTPATIENT)
Dept: NEPHROLOGY | Facility: CLINIC | Age: 52
End: 2017-05-05

## 2017-05-05 NOTE — PROGRESS NOTES
Reason for Call    Followed up with patient to discuss dialysis.    He has consult with Dr. Tay for PD catheter on Monday 5/8. Will get him scheduled with Dr. Perez on 5/25 for sooner follow up appointment. Per Dr. Perez, no urgency for starting dialysis if patient's symptoms are stable, but good to start planning for it since we are obviously unsure when he will get a transplant.     Patient reports symptoms are stable and no urgent symptoms- no chest pain, shortness of breath, or uncontrolled edema. Does have fatigue, insomnia, and itching. Appetite continues to be okay.     Patient was given an opportunity to ask questions and have those questions answered to his satisfaction.  Patient verbalized understanding of instructions provided and agreed to plan of care.    Amaury Vazquez, RN, BAN  Nephrology RN Care Coordinator

## 2017-05-08 ENCOUNTER — OFFICE VISIT (OUTPATIENT)
Dept: TRANSPLANT | Facility: CLINIC | Age: 52
End: 2017-05-08
Attending: TRANSPLANT SURGERY
Payer: COMMERCIAL

## 2017-05-08 VITALS
TEMPERATURE: 98.8 F | SYSTOLIC BLOOD PRESSURE: 121 MMHG | RESPIRATION RATE: 16 BRPM | OXYGEN SATURATION: 99 % | HEART RATE: 80 BPM | DIASTOLIC BLOOD PRESSURE: 77 MMHG

## 2017-05-08 DIAGNOSIS — N18.5 CKD (CHRONIC KIDNEY DISEASE) STAGE 5, GFR LESS THAN 15 ML/MIN (H): ICD-10-CM

## 2017-05-08 DIAGNOSIS — N18.5 ANEMIA IN STAGE 5 CHRONIC KIDNEY DISEASE (H): ICD-10-CM

## 2017-05-08 DIAGNOSIS — D63.1 ANEMIA IN STAGE 5 CHRONIC KIDNEY DISEASE (H): ICD-10-CM

## 2017-05-08 DIAGNOSIS — N18.5 CHRONIC KIDNEY DISEASE, STAGE 5 (H): Primary | ICD-10-CM

## 2017-05-08 LAB
HCT VFR BLD AUTO: 29.9 % (ref 40–53)
HGB BLD-MCNC: 9.8 G/DL (ref 13.3–17.7)
SA1 CELL: NORMAL
SA1 COMMENTS: NORMAL
SA1 HI RISK ABY: NORMAL
SA1 MOD RISK ABY: NORMAL
SA1 TEST METHOD: NORMAL
SA2 CELL: NORMAL
SA2 COMMENTS: NORMAL
SA2 HI RISK ABY UA: NORMAL
SA2 MOD RISK ABY: NORMAL
SA2 TEST METHOD: NORMAL

## 2017-05-08 PROCEDURE — 85014 HEMATOCRIT: CPT | Performed by: FAMILY MEDICINE

## 2017-05-08 PROCEDURE — 36415 COLL VENOUS BLD VENIPUNCTURE: CPT | Performed by: FAMILY MEDICINE

## 2017-05-08 PROCEDURE — 85018 HEMOGLOBIN: CPT | Performed by: FAMILY MEDICINE

## 2017-05-08 NOTE — MR AVS SNAPSHOT
After Visit Summary   5/8/2017    Cesar Villalobos    MRN: [id number]           Patient Information     Date Of Birth          1965        Visit Information        Provider Department      5/8/2017 1:45 PM Caden Tay MD [facility] Solid [facility] Transplant        Today's Diagnoses     Chronic kidney disease, stage 5 (H)    -  1       Follow-ups after your visit        Your next 10 appointments already scheduled     May 25, 2017  2:15 PM CDT   (Arrive by 2:00 PM)   Lab with  LAB    [facility] ([facility])    [address] [place] [id number]   [phone]            May 25, 2017  2:30 PM CDT   NUTRITION VISIT with Laurie Kate RD   [facility] Solid [facility] Transplant ([facility])    [address] [place] [id number] [phone]            May 25, 2017  3:05 PM CDT   (Arrive by 2:35 PM)   Return Visit with Asya Perez MD   [facility] Nephrology ([facility])    [address] [place] [id number] [phone]            Jun 21, 2017  8:00 AM CDT   Lab with  LAB   [facility] Lab ([facility])    [address] [place] [id number]   [phone]            Jun 21, 2017  9:05 AM CDT   (Arrive by 8:35 AM)   Return Visit with Asya Perez MD   [facility] Nephrology ([facility])    [address] [place] [id number] [phone]              Who to contact     If you have questions or need follow up information about today's clinic visit or your schedule please contact [facility] directly at [phone].  Normal or non-critical lab and imaging results will be communicated to you by MyChart, letter or phone within 4 business days after the clinic has received the results. If you do not hear from us within 7  days, please contact the clinic through [employer] or phone. If you have a critical or abnormal lab result, we will notify you by phone as soon as possible.  Submit refill requests through [employer] or call your pharmacy and they will forward the refill request to us. Please allow 3 business days for your refill to be completed.          Additional Information About Your Visit        [employer] Information     [employer] gives you secure access to your electronic health record. If you see a primary care provider, you can also send messages to your care team and make appointments. If you have questions, please call your primary care clinic.  If you do not have a primary care provider, please call [phone] and they will assist you.        Care EveryWhere ID     This is your Care EveryWhere ID. This could be used by other organizations to access your [facility] medical records  [id number]        Your Vitals Were     Pulse Temperature Respirations Pulse Oximetry          80 98.8  F (37.1  C) (Oral) 16 99%         Blood Pressure from Last 3 Encounters:   05/08/17 121/77   01/18/17 121/80   08/25/16 116/75    Weight from Last 3 Encounters:   01/18/17 103.1 kg (227 lb 6.4 oz)   08/30/16 100.8 kg (222 lb 4.8 oz)   08/25/16 100.8 kg (222 lb 3.2 oz)              Today, you had the following     No orders found for display       Primary Care Provider    None Specified       No primary provider on file.        Thank you!     Thank you for choosing [facility]  for your care. Our goal is always to provide you with excellent care. Hearing back from our patients is one way we can continue to improve our services. Please take a few minutes to complete the written survey that you may receive in the mail after your visit with us. Thank you!             Your Updated Medication List - Protect others around you: Learn how to safely use, store and throw away your medicines at www.disposemymeds.org.          This list is  accurate as of: 5/8/17  2:54 PM.  Always use your most recent med list.                   Brand Name Dispense Instructions for use    atorvastatin 10 MG tablet    LIPITOR    90 tablet    Take 1 tablet (10 mg) by mouth daily       calcitRIOL 0.5 MCG capsule    ROCALTROL    180 capsule    Take 2 capsules (1 mcg) by mouth daily       calcium carbonate 500 MG chewable tablet    TUMS     Take 2 chew tab by mouth daily       DILT- MG 24 hr capsule   Generic drug:  diltiazem     90 capsule    TAKE 1 CAPSULE BY MOUTH EVERY DAY       ferrous sulfate 325 (65 FE) MG tablet    IRON    100 tablet    Take 1 tablet (325 mg) by mouth daily (with breakfast)       fish oil-omega-3 fatty acids 1000 MG capsule      Take 1 capsule by mouth daily.       lisinopril 20 MG tablet    PRINIVIL/ZESTRIL    90 tablet    TAKE 1 TABLET BY MOUTH DAILY       sodium bicarbonate 650 MG tablet     120 tablet    Take 2 tablets (1,300 mg) by mouth 2 times daily       vitamin D 2000 UNITS Caps      Take 4,000 Units by mouth daily

## 2017-05-08 NOTE — LETTER
5/8/2017       RE: Cesar Villalobos  [address] [place] [address]     Dear Colleague,    Thank you for referring your patient, Cesar Villalobos, to the [facility] at [facility]. Please see a copy of my visit note below.    Dialysis Access Service  Consult Note    Referred by Dr. Perez for consideration of peritoneal dialysis access.    HPI: Mr. Villalobos is being seen today for placement of peritoneal dialysis access due to Chronic renal failure Mr. Villalobos is not dialyzing at (Not currently on dialysis).      Past Surgical History:   Procedure Laterality Date     HERNIA REPAIR, INGUINAL RT/LT Left     as child       Risk factors for complications of PD catheter access are:     Hx of abdominal surgery  []    Comment:       Hx of  hernia repair/hydrocele []        History of previous PD catheter []    Comment:     Respiratory problems   []    Comment:   Obesity    []      Diabetes   []         Anticoagulation:   []    Agent:                                      Current immunosuppression []   Comment:                                  PHYSICAL EXAM:  Temp:  [98.8  F (37.1  C)] 98.8  F (37.1  C)  Pulse:  [80] 80  Resp:  [16] 16  BP: (121)/(77) 121/77  SpO2:  [99 %] 99 %  healthy, alert and cooperative  Abdominal Exam: normal      Assessment & Plan: Mr. Villalobos is a excellent candidate for peritoneal  dialysis access.  I would recommend laparoscopic PD catheter placement with left sided exit, created under General.     The surgical risks and benefits were reviewed and questions were answered. We discussed the day of surgery plan, anesthesia, postop care, risk of infection, bleeding, thrombosis, possible need for intraoperative omentectomy or newly detected hernia repair or obliteration of patent processus vaginalis (if male), future need for surgical revision or removal. This was contrasted with morbidity and mortality risk of long-term catheter  based hemodialysis access. The patient does wish to proceed with surgery for permanent access creation at this time. The patient was counselled to contact our nurse coordinator, RUPA Lucero (Sum), [facility] at [phone] with any questions or concerns.  Thank you for the opportunity to participate in Mr. Villalobos's care.    TT: 35 min, CT: 25 min.    .

## 2017-05-08 NOTE — NURSING NOTE
"Chief Complaint   Patient presents with     Vascular Access Problem     PD cath       Initial /77 (BP Location: Right arm, Patient Position: Chair, Cuff Size: Child)  Pulse 80  Temp 98.8  F (37.1  C) (Oral)  Resp 16  SpO2 99% Estimated body mass index is 29.2 kg/(m^2) as calculated from the following:    Height as of 1/18/17: 1.88 m (6' 2\").    Weight as of 1/18/17: 103.1 kg (227 lb 6.4 oz).  Medication Reconciliation: incomplete    "

## 2017-05-08 NOTE — PROGRESS NOTES
Dialysis Access Service  Consult Note    Referred by Dr. Perez for consideration of peritoneal dialysis access.    HPI: Mr. Villalobos is being seen today for placement of peritoneal dialysis access due to Chronic renal failure Mr. Villalobos is not dialyzing at (Not currently on dialysis).      Past Surgical History:   Procedure Laterality Date     HERNIA REPAIR, INGUINAL RT/LT Left     as child       Risk factors for complications of PD catheter access are:     Hx of abdominal surgery  []    Comment:       Hx of  hernia repair/hydrocele []        History of previous PD catheter []    Comment:     Respiratory problems   []    Comment:   Obesity    []      Diabetes   []         Anticoagulation:   []    Agent:                                      Current immunosuppression []   Comment:                                  PHYSICAL EXAM:  Temp:  [98.8  F (37.1  C)] 98.8  F (37.1  C)  Pulse:  [80] 80  Resp:  [16] 16  BP: (121)/(77) 121/77  SpO2:  [99 %] 99 %  healthy, alert and cooperative  Abdominal Exam: normal      Assessment & Plan: Mr. Villalobos is a excellent candidate for peritoneal  dialysis access.  I would recommend laparoscopic PD catheter placement with left sided exit, created under General.     The surgical risks and benefits were reviewed and questions were answered. We discussed the day of surgery plan, anesthesia, postop care, risk of infection, bleeding, thrombosis, possible need for intraoperative omentectomy or newly detected hernia repair or obliteration of patent processus vaginalis (if male), future need for surgical revision or removal. This was contrasted with morbidity and mortality risk of long-term catheter based hemodialysis access. The patient does wish to proceed with surgery for permanent access creation at this time. The patient was counselled to contact our nurse coordinator, RUPA Lucero (Sum), [facility] at [phone] with any questions or concerns.  Thank you for the opportunity to  participate in Mr. Villalobos's care.    TT: 35 min, CT: 25 min.    .

## 2017-05-09 ENCOUNTER — TELEPHONE (OUTPATIENT)
Dept: PHARMACY | Facility: CLINIC | Age: 52
End: 2017-05-09

## 2017-05-09 ENCOUNTER — CARE COORDINATION (OUTPATIENT)
Dept: NEPHROLOGY | Facility: CLINIC | Age: 52
End: 2017-05-09

## 2017-05-09 NOTE — PROGRESS NOTES
Reason for Call    Patient met with Dr. Tay yesterday for PD cath surgery consult.    He would like to meet with PD nurses to learn more about PD. I called Khushi at [facility] PD unit. She will call patient to set up a time for education.     Patient has follow up appointment with Dr. Perez on 5/25, so will discuss timing of PD surgery then.     Amaury Vazquez RN

## 2017-05-09 NOTE — TELEPHONE ENCOUNTER
Anemia Management Note  SUBJECTIVE/OBJECTIVE:  Referred by Dr. Asya Perez on 2017.  Primary Diagnosis: Anemia in Chronic Kidney Disease (N18.5, D63.1)    Secondary Diagnosis: Chronic Kidney Disease, Stage 5 (N18.5)  Hgb goal range: 9-10  Epo/Darbo: None  Iron regimen: Ferrous Sulfate once daily  Labs : 18  No history of stroke, MI and blood clots or cancers    Anemia Latest Ref Rng & Units 3/14/2016 8/15/2016 2016 2016 2017 2017 2017   Hemoglobin 13.3 - 17.7 g/dL 11.3(L) 10.6(L) 11.1(L) 11.2(L) 11.0(L) 8.8(L) 9.8(L)   TSAT 15 - 46 % 26 - - - 22 - -   Ferritin 26 - 388 ng/mL 246 - - - 274 - -     BP Readings from Last 3 Encounters:   17 121/77   17 121/80   16 116/75     Wt Readings from Last 2 Encounters:   17 227 lb 6.4 oz (103.1 kg)   16 222 lb 4.8 oz (100.8 kg)     ASSESSMENT:  Hgb:at goal - continue to monitor  TSat: Due for labs Ferritin: Due for labs    PLAN:  RTC for hgb and iron studies in 2 week(s)    Orders needed to be renewed (for next follow-up date) in EPIC: None    Iron labs due:  now    Plan discussed with:  Cesar  Plan provided by:  Deb    NEXT FOLLOW-UP DATE:      Anemia Management Service  Deb Zamora,PharmD and Pippa GironCPhT  Phone: [phone]  Fax: [phone]

## 2017-05-22 DIAGNOSIS — N18.5 ANEMIA IN STAGE 5 CHRONIC KIDNEY DISEASE (H): ICD-10-CM

## 2017-05-22 DIAGNOSIS — N18.5 CKD (CHRONIC KIDNEY DISEASE) STAGE 5, GFR LESS THAN 15 ML/MIN (H): ICD-10-CM

## 2017-05-22 DIAGNOSIS — D63.1 ANEMIA IN STAGE 5 CHRONIC KIDNEY DISEASE (H): ICD-10-CM

## 2017-05-22 LAB
HCT VFR BLD AUTO: 27.7 % (ref 40–53)
HGB BLD-MCNC: 9.1 G/DL (ref 13.3–17.7)

## 2017-05-22 PROCEDURE — 83540 ASSAY OF IRON: CPT | Performed by: FAMILY MEDICINE

## 2017-05-22 PROCEDURE — 85018 HEMOGLOBIN: CPT | Performed by: FAMILY MEDICINE

## 2017-05-22 PROCEDURE — 83550 IRON BINDING TEST: CPT | Performed by: FAMILY MEDICINE

## 2017-05-22 PROCEDURE — 82728 ASSAY OF FERRITIN: CPT | Performed by: FAMILY MEDICINE

## 2017-05-22 PROCEDURE — 85014 HEMATOCRIT: CPT | Performed by: FAMILY MEDICINE

## 2017-05-22 PROCEDURE — 36415 COLL VENOUS BLD VENIPUNCTURE: CPT | Performed by: FAMILY MEDICINE

## 2017-05-23 LAB
FERRITIN SERPL-MCNC: 383 NG/ML (ref 26–388)
IRON SATN MFR SERPL: 25 % (ref 15–46)
IRON SERPL-MCNC: 85 UG/DL (ref 35–180)
TIBC SERPL-MCNC: 334 UG/DL (ref 240–430)

## 2017-05-25 ENCOUNTER — OFFICE VISIT (OUTPATIENT)
Dept: NEPHROLOGY | Facility: CLINIC | Age: 52
End: 2017-05-25
Attending: INTERNAL MEDICINE
Payer: COMMERCIAL

## 2017-05-25 ENCOUNTER — TELEPHONE (OUTPATIENT)
Dept: PHARMACY | Facility: CLINIC | Age: 52
End: 2017-05-25

## 2017-05-25 ENCOUNTER — ALLIED HEALTH/NURSE VISIT (OUTPATIENT)
Dept: TRANSPLANT | Facility: CLINIC | Age: 52
End: 2017-05-25
Attending: INTERNAL MEDICINE
Payer: COMMERCIAL

## 2017-05-25 VITALS
DIASTOLIC BLOOD PRESSURE: 59 MMHG | SYSTOLIC BLOOD PRESSURE: 94 MMHG | HEART RATE: 92 BPM | TEMPERATURE: 97.8 F | OXYGEN SATURATION: 97 % | BODY MASS INDEX: 28.77 KG/M2 | WEIGHT: 224.2 LBS | HEIGHT: 74 IN

## 2017-05-25 DIAGNOSIS — N18.5 CKD (CHRONIC KIDNEY DISEASE) STAGE 5, GFR LESS THAN 15 ML/MIN (H): ICD-10-CM

## 2017-05-25 DIAGNOSIS — G25.81 RESTLESS LEG SYNDROME: Primary | ICD-10-CM

## 2017-05-25 DIAGNOSIS — N18.5 CHRONIC KIDNEY DISEASE, STAGE 5 (H): ICD-10-CM

## 2017-05-25 DIAGNOSIS — N18.5 ANEMIA OF CHRONIC RENAL FAILURE, STAGE 5 (H): ICD-10-CM

## 2017-05-25 DIAGNOSIS — N25.81 SECONDARY RENAL HYPERPARATHYROIDISM (H): ICD-10-CM

## 2017-05-25 DIAGNOSIS — Z76.82 ORGAN TRANSPLANT CANDIDATE: Primary | ICD-10-CM

## 2017-05-25 DIAGNOSIS — N18.5 ANEMIA OF CHRONIC RENAL FAILURE, STAGE 5 (H): Primary | ICD-10-CM

## 2017-05-25 DIAGNOSIS — E87.20 ACIDOSIS: ICD-10-CM

## 2017-05-25 DIAGNOSIS — D63.1 ANEMIA OF CHRONIC RENAL FAILURE, STAGE 5 (H): Primary | ICD-10-CM

## 2017-05-25 DIAGNOSIS — D63.1 ANEMIA OF CHRONIC RENAL FAILURE, STAGE 5 (H): ICD-10-CM

## 2017-05-25 LAB
ALBUMIN SERPL-MCNC: 3.8 G/DL (ref 3.4–5)
ANION GAP SERPL CALCULATED.3IONS-SCNC: 14 MMOL/L (ref 3–14)
BUN SERPL-MCNC: 108 MG/DL (ref 7–30)
CALCIUM SERPL-MCNC: 12.4 MG/DL (ref 8.5–10.1)
CHLORIDE SERPL-SCNC: 103 MMOL/L (ref 94–109)
CO2 SERPL-SCNC: 21 MMOL/L (ref 20–32)
CREAT SERPL-MCNC: 8.82 MG/DL (ref 0.66–1.25)
CREAT UR-MCNC: 64 MG/DL
ERYTHROCYTE [DISTWIDTH] IN BLOOD BY AUTOMATED COUNT: 12.5 % (ref 10–15)
GFR SERPL CREATININE-BSD FRML MDRD: 6 ML/MIN/1.7M2
GLUCOSE SERPL-MCNC: 134 MG/DL (ref 70–99)
HCT VFR BLD AUTO: 28.8 % (ref 40–53)
HGB BLD-MCNC: 9.3 G/DL (ref 13.3–17.7)
MCH RBC QN AUTO: 30.1 PG (ref 26.5–33)
MCHC RBC AUTO-ENTMCNC: 32.3 G/DL (ref 31.5–36.5)
MCV RBC AUTO: 93 FL (ref 78–100)
PHOSPHATE SERPL-MCNC: 6 MG/DL (ref 2.5–4.5)
PLATELET # BLD AUTO: 214 10E9/L (ref 150–450)
POTASSIUM SERPL-SCNC: 4.9 MMOL/L (ref 3.4–5.3)
PROT UR-MCNC: 0.44 G/L
PROT/CREAT 24H UR: 0.69 G/G CR (ref 0–0.2)
PTH-INTACT SERPL-MCNC: 46 PG/ML (ref 12–72)
RBC # BLD AUTO: 3.09 10E12/L (ref 4.4–5.9)
SODIUM SERPL-SCNC: 138 MMOL/L (ref 133–144)
WBC # BLD AUTO: 7.2 10E9/L (ref 4–11)

## 2017-05-25 PROCEDURE — 36415 COLL VENOUS BLD VENIPUNCTURE: CPT | Performed by: INTERNAL MEDICINE

## 2017-05-25 PROCEDURE — 83970 ASSAY OF PARATHORMONE: CPT | Performed by: INTERNAL MEDICINE

## 2017-05-25 PROCEDURE — 80069 RENAL FUNCTION PANEL: CPT | Performed by: INTERNAL MEDICINE

## 2017-05-25 PROCEDURE — 99213 OFFICE O/P EST LOW 20 MIN: CPT

## 2017-05-25 PROCEDURE — 25000128 H RX IP 250 OP 636: Mod: ZF | Performed by: INTERNAL MEDICINE

## 2017-05-25 PROCEDURE — 85027 COMPLETE CBC AUTOMATED: CPT | Performed by: INTERNAL MEDICINE

## 2017-05-25 PROCEDURE — 82306 VITAMIN D 25 HYDROXY: CPT | Performed by: INTERNAL MEDICINE

## 2017-05-25 PROCEDURE — 84156 ASSAY OF PROTEIN URINE: CPT | Performed by: INTERNAL MEDICINE

## 2017-05-25 PROCEDURE — 96372 THER/PROPH/DIAG INJ SC/IM: CPT

## 2017-05-25 RX ORDER — SODIUM BICARBONATE 650 MG/1
1300 TABLET ORAL 3 TIMES DAILY
Qty: 120 TABLET | Refills: 11 | Status: SHIPPED | OUTPATIENT
Start: 2017-05-25 | End: 2017-06-29

## 2017-05-25 RX ORDER — PRAMIPEXOLE DIHYDROCHLORIDE 0.12 MG/1
0.12 TABLET ORAL AT BEDTIME
Qty: 30 TABLET | Refills: 3 | Status: SHIPPED | OUTPATIENT
Start: 2017-05-25 | End: 2017-06-02

## 2017-05-25 RX ADMIN — DARBEPOETIN ALFA 60 MCG: 60 INJECTION, SOLUTION INTRAVENOUS; SUBCUTANEOUS at 16:12

## 2017-05-25 ASSESSMENT — PAIN SCALES - GENERAL: PAINLEVEL: NO PAIN (0)

## 2017-05-25 NOTE — PROGRESS NOTES
"ASSESSMENT AND RECOMMENDATIONS:    51 year old male with history of CKD noted over last few years, now stage 4/5, of unclear etiology. Also has a brother now on dialysis. He has history of gross hematuria a few years ago.    1.  CKD stage 5 with minimal proteinuria-  Will start PD when needed, and he is quite symptomatic in last few weeks.   He has been on waiting list since October 2014, so hoping to 'bide his time' but now symptomatic thus will initiate dialysis.  2.  Hypertension: at goal, actually low today and will STOP lisinopril, continue diltiazem, but if still low, would D/C it as well.  3.  History of remote episode of gross hematuria in 2002. ? IgA- though intriguing that his brother and a couple other family members have renal failure.    - he does take fish oil  4.  Anemia in chronic kidney disease; hgb down from 11 to 9.2, will refer to anemia clinic, EPO x 1 today as he is feeling symptomatic.  5. Secondary hyperparathyroidism: PTH at goal for his CKD stage, on calcitriol and cholecalciferol 2000mg daily- may need to increase dose  6. Vitamin D deficiency- on calcitriol and cholecalciferol, states he did not improve on ergocalciferol  7. Metabolic acidosis- taking 2-4 tabs daily, increase to TID (6tabs) until starting dialysis  -PD catheter to be placed in coming weeks, schedule ASAP, and initiate 1-2 weeks thereafter    REASON FOR VISIT:      The patient is here for followup on advanced chronic kidney disease, stage 5.     HISTORY OF PRESENT ILLNESS:      He is a pleasant 51 year-old male with CKD previously followed by Dr Taylor. He is fairly healthy other than kidney disease.  It is unclear what the cause of kidney failure is but suspect it may be IgA given that he had a gross hematuria episode in 2002, which was thought to be a \" kidney infection\" by a doctor in [place], and he was given an antibiotic with resolution of the hematuria.  A while later his Scr was noted to be higher by his primary care " physician in [place].   In , he was told that his GFR was around 59, then in , his creatinine was up to 1.8 mg/dl.  He was referred to a nephrologist who told him that he had mild kidney dysfunction.  He did not comprehend the seriousness of this and felt reassured, thus he did not follow closely.   He was started on lisinopril 5 mg for mild hypertension. He did not undergo further workup and does not remember being offered a kidney biopsy.   In  he was seen by his nephrologist and his serum creatinine was slightly higher in the range of 2 with an estimated GFR of 33. In May 2012, he had a decline of his GFR per him to 29 mL per minute  There was no clear cause for this. He does not take NSAIDs, have  history of kidney stones or any indication of a system or autoimmune process.  His brother has signfiicant kidney failure and has recently started dialysis (GFR of 3 apparently). He has a maternal aunt who is diabetic and has kidney failure in her 60s.  Also, he has a cousin who is overweight and diabetic who has kidney problems, but no overt or known genetic disease.    Since last visit he continues to work full time but does complain of significant fatigue.His Scr is up to >7 with eGFR 7. He is feeling worse for sure and now less stoic about admitting it. He is planning to do PD and we will arrange for a catheter. He has restless legs (was taking benadryl to help him sleep).  His blood pressure is actually low in 90-100s. He is feeling very tired after lunch.  Though he has had numerous donors submitted, no actual donor is secured at this time and still  Has about a year on  donor list by Dr Tay's estimate.  He has acidosis and is on bicarbonate, taking 2-4 pills a day, remembering more often to take it, but bicarbonate is still low    He works for [employer]. He has gained a few pounds ut now lost a couple.          PAST MEDICAL HISTORY:      chronic kidney  "disease  Gross hematuria in 2002  Hyperlipidemia  hypertension.       PAST SURGICAL HISTORY:      abdominal hernia repair at age 3      FAMILY HISTORY:      Reviewed.  Maternal aunt is diabetic and she has kidney disease that is likely related to diabetes mellitus.  His cousin also has diabetes and kidney disease.   His brother has kidney failure    SOCIAL HISTORY:      No current tobacco use.   Moderate alcohol intake    ROS:     A comprehensive review of systems was obtained and negative.    Personal Hx:   Social History     Social History     Marital status:      Spouse name: mike     Number of children: 1     Years of education: N/A     Occupational History      [employer]     Social History Main Topics     Smoking status: Never Smoker     Smokeless tobacco: Not on file     Alcohol use 0.0 oz/week     0 Standard drinks or equivalent per week      Comment: 2 beers monthly     Drug use: No     Sexual activity: Yes     Other Topics Concern     Exercise No     Social History Narrative       Allergies:  Allergies   Allergen Reactions     Goodys Body Pain      Nsaids      Swelling and Hives         Medications:  Current Outpatient Prescriptions   Medication     DiphenhydrAMINE HCl (BENADRYL PO)     calcitRIOL (ROCALTROL) 0.5 MCG capsule     sodium bicarbonate 650 MG tablet     lisinopril (PRINIVIL/ZESTRIL) 20 MG tablet     DILT- MG 24 hr ER capsule     calcium carbonate (TUMS) 500 MG chewable tablet     atorvastatin (LIPITOR) 10 MG tablet     ferrous sulfate (IRON) 325 (65 FE) MG tablet     Cholecalciferol (VITAMIN D) 2000 UNITS CAPS     fish oil-omega-3 fatty acids (FISH OIL) 1000 MG capsule     No current facility-administered medications for this visit.      Vitals:    Pulse 92  Temp 97.8  F (36.6  C) (Oral)  Ht 1.88 m (6' 2\")  Wt 101.7 kg (224 lb 3.2 oz)  SpO2 97%  BMI 28.79 kg/m2    Exam:  GENERAL APPEARANCE: alert and no distress  HENT: mouth without ulcers or lesions  LYMPHATICS: no cervical " adenopathy  RESP: lungs clear to auscultation  CV: regular rhythm, normal rate, no rub, no murmur  EDEMA: no LE edema bilaterally  ABDOMEN:  soft, nontender and bowel sounds normal  MSno gross deformities noted  SKIN: no rash  NEURO: grossly non-focal  PSYCH: mentation appears normal and affect normal    Results:  Recent Results (from the past 168 hour(s))   Hemoglobin and hematocrit    Collection Time: 05/22/17  3:31 PM   Result Value Ref Range    Hemoglobin 9.1 (L) 13.3 - 17.7 g/dL    Hematocrit 27.7 (L) 40.0 - 53.0 %   Iron and iron binding capacity    Collection Time: 05/22/17  3:31 PM   Result Value Ref Range    Iron 85 35 - 180 ug/dL    Iron Binding Cap 334 240 - 430 ug/dL    Iron Saturation Index 25 15 - 46 %   Ferritin    Collection Time: 05/22/17  3:31 PM   Result Value Ref Range    Ferritin 383 26 - 388 ng/mL   CBC with platelets    Collection Time: 05/25/17  2:27 PM   Result Value Ref Range    WBC 7.2 4.0 - 11.0 10e9/L    RBC Count 3.09 (L) 4.4 - 5.9 10e12/L    Hemoglobin 9.3 (L) 13.3 - 17.7 g/dL    Hematocrit 28.8 (L) 40.0 - 53.0 %    MCV 93 78 - 100 fl    MCH 30.1 26.5 - 33.0 pg    MCHC 32.3 31.5 - 36.5 g/dL    RDW 12.5 10.0 - 15.0 %    Platelet Count 214 150 - 450 10e9/L       Component      Latest Ref Rng 12/14/2015 2/23/2016 3/14/2016 8/15/2016   Color Urine       Light Yellow      Appearance Urine       Clear      Glucose Urine      NEG mg/dL Negative      Bilirubin Urine      NEG Negative      Ketones Urine      NEG mg/dL Negative      Specific Gravity Urine      1.003 - 1.035 1.008      Blood Urine      NEG Negative      pH Urine      5.0 - 7.0 pH 5.5      Protein Albumin Urine      NEG mg/dL 10 (A)      Urobilinogen mg/dL      0.0 - 2.0 mg/dL Normal      Nitrite Urine      NEG Negative      Leukocyte Esterase Urine      NEG Negative      Source       Urine      WBC Urine      0 - 2 /HPF <1      RBC Urine      0 - 2 /HPF <1      Renal Tub Epi      NEG /HPF <1 (A)      Mucous Urine      NEG /LPF  Present (A)      Sodium      133 - 144 mmol/L 141 138 141 142   Potassium      3.4 - 5.3 mmol/L 4.6 4.8 5.1 4.9   Chloride      94 - 109 mmol/L 112 (H) 109 112 (H) 116 (H)   Carbon Dioxide      20 - 32 mmol/L 18 (L) 21 19 (L) 14 (L)   Anion Gap      3 - 14 mmol/L 11 8 10 12   Glucose      70 - 99 mg/dL 91 89 105 (H) 90   Urea Nitrogen      7 - 30 mg/dL 60 (H) 54 (H) 54 (H) 64 (H)   Creatinine      0.66 - 1.25 mg/dL 4.65 (H) 4.30 (H) 4.55 (H) 4.58 (H)   GFR Estimate      >60 mL/min/1.7m2 13 (L) 15 (L) 14 (L) 13 (L)   GFR Estimate If Black      >60 mL/min/1.7m2 16 (L) 18 (L) 16 (L) 16 (L)   Calcium      8.5 - 10.1 mg/dL 8.4 (L) 8.6 9.1 8.6   Phosphorus      2.5 - 4.5 mg/dL 4.1  3.7 3.9   Albumin      3.4 - 5.0 g/dL 3.7  3.8 3.7   Iron      35 - 180 ug/dL   81    Iron Binding Cap      240 - 430 ug/dL   310    Iron Saturation Index      15 - 46 %   26    Protein Random Urine         0.43    Protein Total Urine g/gr Creatinine      0 - 0.2 g/g Cr   0.63 (H)    Hemoglobin      13.3 - 17.7 g/dL 11.1 (L)  11.3 (L) 10.6 (L)   Parathyroid Hormone Intact      12 - 72 pg/mL   157 (H)    Ferritin      26 - 388 ng/mL   246    Vitamin D Deficiency screening      20 - 75 ug/L   26    Creatinine Urine         69

## 2017-05-25 NOTE — PROGRESS NOTES
"OUTPATIENT NUTRITION (RE) ASSESSMENT NOTE    REASON FOR (RE) ASSESSMENT  Cesar Villalobos is a 51 year old male seen by the dietitian for update on CKD 5 diet while on wait list for kidney txp referred by Dr. Perez  Pt accompanied by self  H/o previous txp: kidney listed 10/2014; no previous txp     NUTRITION HISTORY  - Pt-reported special diets/eating habits: low K+, low Phos   - Dining out/food not made at home: 1x/day for lunch.   - Appetite: \"too good\"  - Vitamins/supplements/herbal products: tums with meals (better at taking these than in the past), iron, vit D, fish oil     Typical food/fluid intake:   B: 2 eggs with whole wheat toast (jam or plain)  L: Greek jeanie carol chix and rice from restaurant; typically goes out to eat   D: pizza from [employer] or veggie omelet or spaghetti with chix sausage, peppers, and onions   Snacks: pringles   Beverages: water, coffee with milk, lemon juice from lemon, lemonade   ETOH (1 drink = 12 oz beer, 5 oz wine, 1.5 oz liquor): none     Pt tries to avoid processed foods d/t sodium content and possibility of added phosphorus. He is likely starting PD June or July and has had renal and dialysis diet education in the past.     Current adherence to recommended diet (low K+, low Phos): Fair/Good    Physical Activity: None lately d/t fatigue. Used to bike, walk dogs, etc up until winter time when fatigue was worsening.     PHYSICAL FINDINGS  Observed  None     ANTHROPOMETRICS  Height: 74\"  Weight: 224 lbs  BMI: 28.8  IBW: 190 lbs  % IBW: 118  Weight History: Wt relatively stable. Not retaining fluid now.   Adjusted/dosing weight: 224 lbs/102 kg    LABS  Labs reviewed  5/25: K+ WNL 4.9 (prev WNL x 2), Phos high at 6 (prev high x 2)    MEDICATIONS  Medications reviewed    PROCEDURES WITH NUTRITIONAL IMPLICATIONS  None     ASSESSED NUTRITION NEEDS:  Estimated Energy Needs: 2550 kcals (25 Kcal/Kg)  Justification: maintenance  Estimated Protein Needs: 122-143 protein (1.2-1.4 g " pro/Kg)  Justification: once dialysis starts  Estimated Fluid Needs: (1 mL/Kcal)  Justification: maintenance    MALNUTRITION  % Intake:  No decreased intake noted  % Weight Loss:  None noted  Subcutaneous Fat Loss:  None  Muscle Loss:  None  Fluid Accumulation/Edema:  None noted  Malnutrition Diagnosis: Patient does not meet two of the above criteria necessary for diagnosing malnutrition    NUTRITION DIAGNOSIS:  Excessive sodium intake r/t high intake of restaurant food items and processed foods AEB diet recall.     INTERVENTIONS  1. Reviewed renal diet and that the major change once he starts dialysis is increase in protein. Provided list of protein sources (emphasized meat and eggs over plant proteins) and goal for protein once dialysis starts. Also discussed considering protein shakes or bars (kidney-friendly) at that time.   2. Reviewed Phos foods and natural phos vs processed phos and availability (30 vs 100% absorbed) so although he should choose meat/eggs for main protein source, ok to eat beans, nuts, etc occasionally and this is preferred over processed foods with added phos.   3. Although his K+/Phos labs were not back during our visit, I counseled patient on if levels are elevated, he needs to be stricter in regard to eating high K+/Phos foods. If his levels are WNL, he has somewhat more flexibility with eating these foods.   4. Discussed potential for weight gain with PD start d/t additional calories from dextrose (up to 500 sony/day).       Goals  1. Increase protein intake once dialysis starts  2. Na+ <2000 mg/day  3. Avoid high K+/Phos-rich foods    Follow up/Monitoring  PRN  Patient Understanding: Good  Expected Compliance: Good  Follow-Up Plans: PRN  Provided pt with contact info.   Time spent with patient: 15 minutes.  Laurie Kate, RD, LD

## 2017-05-25 NOTE — LETTER
"5/25/2017      RE: Cesar Villalobos  [address] [place] [address]       ASSESSMENT AND RECOMMENDATIONS:    51 year old male with history of CKD noted over last few years, now stage 4/5, of unclear etiology. Also has a brother now on dialysis. He has history of gross hematuria a few years ago.    1.  CKD stage 5 with minimal proteinuria-  Will start PD when needed, and he is quite symptomatic in last few weeks.   He has been on waiting list since October 2014, so hoping to 'bide his time' but now symptomatic thus will initiate dialysis.  2.  Hypertension: at goal, actually low today and will STOP lisinopril, continue diltiazem, but if still low, would D/C it as well.  3.  History of remote episode of gross hematuria in 2002. ? IgA- though intriguing that his brother and a couple other family members have renal failure.    - he does take fish oil  4.  Anemia in chronic kidney disease; hgb down from 11 to 9.2, will refer to anemia clinic, EPO x 1 today as he is feeling symptomatic.  5. Secondary hyperparathyroidism: PTH at goal for his CKD stage, on calcitriol and cholecalciferol 2000mg daily- may need to increase dose  6. Vitamin D deficiency- on calcitriol and cholecalciferol, states he did not improve on ergocalciferol  7. Metabolic acidosis- taking 2-4 tabs daily, increase to TID (6tabs) until starting dialysis  -PD catheter to be placed in coming weeks, schedule ASAP, and initiate 1-2 weeks thereafter    REASON FOR VISIT:      The patient is here for followup on advanced chronic kidney disease, stage 5.     HISTORY OF PRESENT ILLNESS:      He is a pleasant 51 year-old male with CKD previously followed by Dr Taylor. He is fairly healthy other than kidney disease.  It is unclear what the cause of kidney failure is but suspect it may be IgA given that he had a gross hematuria episode in 2002, which was thought to be a \" kidney infection\" by a doctor in [place], and he was given an antibiotic with resolution " of the hematuria.  A while later his Scr was noted to be higher by his primary care physician in [place].   In , he was told that his GFR was around 59, then in , his creatinine was up to 1.8 mg/dl.  He was referred to a nephrologist who told him that he had mild kidney dysfunction.  He did not comprehend the seriousness of this and felt reassured, thus he did not follow closely.   He was started on lisinopril 5 mg for mild hypertension. He did not undergo further workup and does not remember being offered a kidney biopsy.   In  he was seen by his nephrologist and his serum creatinine was slightly higher in the range of 2 with an estimated GFR of 33. In May 2012, he had a decline of his GFR per him to 29 mL per minute  There was no clear cause for this. He does not take NSAIDs, have  history of kidney stones or any indication of a system or autoimmune process.  His brother has signfiicant kidney failure and has recently started dialysis (GFR of 3 apparently). He has a maternal aunt who is diabetic and has kidney failure in her 60s.  Also, he has a cousin who is overweight and diabetic who has kidney problems, but no overt or known genetic disease.    Since last visit he continues to work full time but does complain of significant fatigue.His Scr is up to >7 with eGFR 7. He is feeling worse for sure and now less stoic about admitting it. He is planning to do PD and we will arrange for a catheter. He has restless legs (was taking benadryl to help him sleep).  His blood pressure is actually low in 90-100s. He is feeling very tired after lunch.  Though he has had numerous donors submitted, no actual donor is secured at this time and still  Has about a year on  donor list by Dr Tay's estimate.  He has acidosis and is on bicarbonate, taking 2-4 pills a day, remembering more often to take it, but bicarbonate is still low    He works for [employer]. He has gained a few pounds  "ut now lost a couple.          PAST MEDICAL HISTORY:      chronic kidney disease  Gross hematuria in 2002  Hyperlipidemia  hypertension.       PAST SURGICAL HISTORY:      abdominal hernia repair at age 3      FAMILY HISTORY:      Reviewed.  Maternal aunt is diabetic and she has kidney disease that is likely related to diabetes mellitus.  His cousin also has diabetes and kidney disease.   His brother has kidney failure    SOCIAL HISTORY:      No current tobacco use.   Moderate alcohol intake    ROS:     A comprehensive review of systems was obtained and negative.    Personal Hx:   Social History     Social History     Marital status:      Spouse name: mike     Number of children: 1     Years of education: N/A     Occupational History      [employer]     Social History Main Topics     Smoking status: Never Smoker     Smokeless tobacco: Not on file     Alcohol use 0.0 oz/week     0 Standard drinks or equivalent per week      Comment: 2 beers monthly     Drug use: No     Sexual activity: Yes     Other Topics Concern     Exercise No     Social History Narrative       Allergies:  Allergies   Allergen Reactions     Goodys Body Pain      Nsaids      Swelling and Hives         Medications:  Current Outpatient Prescriptions   Medication     DiphenhydrAMINE HCl (BENADRYL PO)     calcitRIOL (ROCALTROL) 0.5 MCG capsule     sodium bicarbonate 650 MG tablet     lisinopril (PRINIVIL/ZESTRIL) 20 MG tablet     DILT- MG 24 hr ER capsule     calcium carbonate (TUMS) 500 MG chewable tablet     atorvastatin (LIPITOR) 10 MG tablet     ferrous sulfate (IRON) 325 (65 FE) MG tablet     Cholecalciferol (VITAMIN D) 2000 UNITS CAPS     fish oil-omega-3 fatty acids (FISH OIL) 1000 MG capsule     No current facility-administered medications for this visit.      Vitals:    Pulse 92  Temp 97.8  F (36.6  C) (Oral)  Ht 1.88 m (6' 2\")  Wt 101.7 kg (224 lb 3.2 oz)  SpO2 97%  BMI 28.79 kg/m2    Exam:  GENERAL APPEARANCE: alert and no " distress  HENT: mouth without ulcers or lesions  LYMPHATICS: no cervical adenopathy  RESP: lungs clear to auscultation  CV: regular rhythm, normal rate, no rub, no murmur  EDEMA: no LE edema bilaterally  ABDOMEN:  soft, nontender and bowel sounds normal  MSno gross deformities noted  SKIN: no rash  NEURO: grossly non-focal  PSYCH: mentation appears normal and affect normal    Results:  Recent Results (from the past 168 hour(s))   Hemoglobin and hematocrit    Collection Time: 05/22/17  3:31 PM   Result Value Ref Range    Hemoglobin 9.1 (L) 13.3 - 17.7 g/dL    Hematocrit 27.7 (L) 40.0 - 53.0 %   Iron and iron binding capacity    Collection Time: 05/22/17  3:31 PM   Result Value Ref Range    Iron 85 35 - 180 ug/dL    Iron Binding Cap 334 240 - 430 ug/dL    Iron Saturation Index 25 15 - 46 %   Ferritin    Collection Time: 05/22/17  3:31 PM   Result Value Ref Range    Ferritin 383 26 - 388 ng/mL   CBC with platelets    Collection Time: 05/25/17  2:27 PM   Result Value Ref Range    WBC 7.2 4.0 - 11.0 10e9/L    RBC Count 3.09 (L) 4.4 - 5.9 10e12/L    Hemoglobin 9.3 (L) 13.3 - 17.7 g/dL    Hematocrit 28.8 (L) 40.0 - 53.0 %    MCV 93 78 - 100 fl    MCH 30.1 26.5 - 33.0 pg    MCHC 32.3 31.5 - 36.5 g/dL    RDW 12.5 10.0 - 15.0 %    Platelet Count 214 150 - 450 10e9/L       Component      Latest Ref Rn 12/14/2015 2/23/2016 3/14/2016 8/15/2016   Color Urine       Light Yellow      Appearance Urine       Clear      Glucose Urine      NEG mg/dL Negative      Bilirubin Urine      NEG Negative      Ketones Urine      NEG mg/dL Negative      Specific Gravity Urine      1.003 - 1.035 1.008      Blood Urine      NEG Negative      pH Urine      5.0 - 7.0 pH 5.5      Protein Albumin Urine      NEG mg/dL 10 (A)      Urobilinogen mg/dL      0.0 - 2.0 mg/dL Normal      Nitrite Urine      NEG Negative      Leukocyte Esterase Urine      NEG Negative      Source       Urine      WBC Urine      0 - 2 /HPF <1      RBC Urine      0 - 2 /HPF <1       Renal Tub Epi      NEG /HPF <1 (A)      Mucous Urine      NEG /LPF Present (A)      Sodium      133 - 144 mmol/L 141 138 141 142   Potassium      3.4 - 5.3 mmol/L 4.6 4.8 5.1 4.9   Chloride      94 - 109 mmol/L 112 (H) 109 112 (H) 116 (H)   Carbon Dioxide      20 - 32 mmol/L 18 (L) 21 19 (L) 14 (L)   Anion Gap      3 - 14 mmol/L 11 8 10 12   Glucose      70 - 99 mg/dL 91 89 105 (H) 90   Urea Nitrogen      7 - 30 mg/dL 60 (H) 54 (H) 54 (H) 64 (H)   Creatinine      0.66 - 1.25 mg/dL 4.65 (H) 4.30 (H) 4.55 (H) 4.58 (H)   GFR Estimate      >60 mL/min/1.7m2 13 (L) 15 (L) 14 (L) 13 (L)   GFR Estimate If Black      >60 mL/min/1.7m2 16 (L) 18 (L) 16 (L) 16 (L)   Calcium      8.5 - 10.1 mg/dL 8.4 (L) 8.6 9.1 8.6   Phosphorus      2.5 - 4.5 mg/dL 4.1  3.7 3.9   Albumin      3.4 - 5.0 g/dL 3.7  3.8 3.7   Iron      35 - 180 ug/dL   81    Iron Binding Cap      240 - 430 ug/dL   310    Iron Saturation Index      15 - 46 %   26    Protein Random Urine         0.43    Protein Total Urine g/gr Creatinine      0 - 0.2 g/g Cr   0.63 (H)    Hemoglobin      13.3 - 17.7 g/dL 11.1 (L)  11.3 (L) 10.6 (L)   Parathyroid Hormone Intact      12 - 72 pg/mL   157 (H)    Ferritin      26 - 388 ng/mL   246    Vitamin D Deficiency screening      20 - 75 ug/L   26    Creatinine Urine         69          Asya Philippe Perez MD

## 2017-05-25 NOTE — MR AVS SNAPSHOT
After Visit Summary   5/25/2017    Cesar Villalobos    MRN: [id number]           Patient Information     Date Of Birth          1965        Visit Information        Provider Department      5/25/2017 3:05 PM Asya Perez MD [facility] Nephrology        Today's Diagnoses     Restless leg syndrome    -  1    CKD (chronic kidney disease) stage 5, GFR less than 15 ml/min (H)        Anemia of chronic renal failure, stage 5 (H)           Follow-ups after your visit        Your next 10 appointments already scheduled     Jun 21, 2017  8:00 AM CDT   Lab with  LAB   [facility] Lab ([facility])    [address] [place] [id number] [phone]            Jun 21, 2017  9:05 AM CDT   (Arrive by 8:35 AM)   Return Visit with Asya Perez MD   [facility] Nephrology ([facility])    [address] [place] [id number] [phone]              Who to contact     If you have questions or need follow up information about today's clinic visit or your schedule please contact [facility] NEPHROLOGY directly at [phone].  Normal or non-critical lab and imaging results will be communicated to you by MyChart, letter or phone within 4 business days after the clinic has received the results. If you do not hear from us within 7 days, please contact the clinic through MyChart or phone. If you have a critical or abnormal lab result, we will notify you by phone as soon as possible.  Submit refill requests through [employer] or call your pharmacy and they will forward the refill request to us. Please allow 3 business days for your refill to be completed.          Additional Information About Your Visit        App in the [employer] Information     [employer] gives you secure access to your electronic health record. If you see a primary care provider, you can also send messages to your care team and make appointments. If you have  "questions, please call your primary care clinic.  If you do not have a primary care provider, please call [phone] and they will assist you.        Care EveryWhere ID     This is your Care EveryWhere ID. This could be used by other organizations to access your [place] medical records  [id number]        Your Vitals Were     Pulse Temperature Height Pulse Oximetry BMI (Body Mass Index)       92 97.8  F (36.6  C) (Oral) 1.88 m (6' 2\") 97% 28.79 kg/m2        Blood Pressure from Last 3 Encounters:   05/25/17 94/59   05/08/17 121/77   01/18/17 121/80    Weight from Last 3 Encounters:   05/25/17 101.7 kg (224 lb 3.2 oz)   01/18/17 103.1 kg (227 lb 6.4 oz)   08/30/16 100.8 kg (222 lb 4.8 oz)              Today, you had the following     No orders found for display         Today's Medication Changes          These changes are accurate as of: 5/25/17  4:10 PM.  If you have any questions, ask your nurse or doctor.               Start taking these medicines.        Dose/Directions    pramipexole 0.125 MG tablet   Commonly known as:  MIRAPEX   Used for:  Restless leg syndrome, CKD (chronic kidney disease) stage 5, GFR less than 15 ml/min (H)   Started by:  Asya Perez MD        Dose:  0.125 mg   Take 1 tablet (0.125 mg) by mouth At Bedtime   Quantity:  30 tablet   Refills:  3         These medicines have changed or have updated prescriptions.        Dose/Directions    sodium bicarbonate 650 MG tablet   This may have changed:  when to take this   Used for:  CKD (chronic kidney disease) stage 5, GFR less than 15 ml/min (H)   Changed by:  Asya Perez MD        Dose:  1300 mg   Take 2 tablets (1,300 mg) by mouth 3 times daily   Quantity:  120 tablet   Refills:  11         Stop taking these medicines if you haven't already. Please contact your care team if you have questions.     lisinopril 20 MG tablet   Commonly known as:  PRINIVIL/ZESTRIL   Stopped by:  Asya Perez MD              "   Where to get your medicines      These medications were sent to [id number] - [place], [place] - [address] AT [facility] [address]  [address], [place] [address]    Hours:  24-hours Phone:  [phone]     pramipexole 0.125 MG tablet    sodium bicarbonate 650 MG tablet                Primary Care Provider    None Specified       No primary provider on file.        Thank you!     Thank you for choosing [facility] NEPHROLOGY  for your care. Our goal is always to provide you with excellent care. Hearing back from our patients is one way we can continue to improve our services. Please take a few minutes to complete the written survey that you may receive in the mail after your visit with us. Thank you!             Your Updated Medication List - Protect others around you: Learn how to safely use, store and throw away your medicines at www.disposemymeds.org.          This list is accurate as of: 5/25/17  4:10 PM.  Always use your most recent med list.                   Brand Name Dispense Instructions for use    atorvastatin 10 MG tablet    LIPITOR    90 tablet    Take 1 tablet (10 mg) by mouth daily       BENADRYL PO      Take 0.25 mg by mouth nightly as needed       calcitRIOL 0.5 MCG capsule    ROCALTROL    180 capsule    Take 2 capsules (1 mcg) by mouth daily       calcium carbonate 500 MG chewable tablet    TUMS     Take 2 chew tab by mouth daily       DILT- MG 24 hr capsule   Generic drug:  diltiazem     90 capsule    TAKE 1 CAPSULE BY MOUTH EVERY DAY       ferrous sulfate 325 (65 FE) MG tablet    IRON    100 tablet    Take 1 tablet (325 mg) by mouth daily (with breakfast)       fish oil-omega-3 fatty acids 1000 MG capsule      Take 1 capsule by mouth daily.       pramipexole 0.125 MG tablet    MIRAPEX    30 tablet    Take 1 tablet (0.125 mg) by mouth At Bedtime       sodium bicarbonate 650 MG tablet     120 tablet    Take 2 tablets (1,300 mg) by mouth 3 times daily        vitamin D 2000 UNITS Caps      Take 4,000 Units by mouth daily

## 2017-05-25 NOTE — TELEPHONE ENCOUNTER
Anemia Management Note  SUBJECTIVE/OBJECTIVE:  Referred by Dr. Asya Perez on 5/2/2017.  Primary Diagnosis: Anemia in Chronic Kidney Disease (N18.5, D63.1)     Secondary Diagnosis:  Chronic Kidney Disease, Stage 5 (N18.5)  Hgb goal range:  9-10  Epo/Darbo: Aranesp 60mcg every other week - in clinic  TP/Rx exp: 5/25/18  Iron regimen:  Ferrous Sulfate two times daily  Labs exp: 5/1/18    Anemia Latest Ref Rng & Units 11/5/2016 12/8/2016 1/18/2017 4/28/2017 5/8/2017 5/22/2017 5/25/2017   SHERRY Dose - - - - - - - 60 mcg   Hemoglobin 13.3 - 17.7 g/dL 11.1(L) 11.2(L) 11.0(L) 8.8(L) 9.8(L) 9.1(L) 9.3(L)   TSAT 15 - 46 % - - 22 - - 25 -   Ferritin 26 - 388 ng/mL - - 274 - - 383 -     BP Readings from Last 3 Encounters:   05/25/17 94/59   05/08/17 121/77   01/18/17 121/80     Wt Readings from Last 2 Encounters:   05/25/17 224 lb 3.2 oz (101.7 kg)   01/18/17 227 lb 6.4 oz (103.1 kg)     Patient received a one time aranesp 60 mcg dose in clinic today.  Dr Perez increased ferrous sulfate to bid.  Patient's next appt w/Dr Perez and labs is scheduled for 6/21/17 at 8:00 am    ASSESSMENT:  Hgb: at goal - received dose in clinic. Will initiate on-going aranesp at 60mcg every other week. -[facility]5/26/18  TSat: not at goal of >30% Ferritin: At goal (>100ng/mL). Increase ferrous sulfate to bid as instructed by Dr Perez. -[facility]5/26/17    PLAN:  Dosed with aranesp and RTC for hgb, ferritin, and iron labs then aranesp if needed in 2 week(s)  5/26: I spoke to Cesar. He will have labs 6/8 then come to U if dose needed. We did not discuss at home admin as he was initiated in clinic by Dr Perez and sounded somewhat overwhelmed with upcoming fistula surgery 6/6. We can discuss at future calls. For now, will check in clinic benefit and proceed this way. He will increase ferrous sulfate to bid. -rocío    Orders needed to be renewed (for next follow-up date) in [facility]: None    Iron labs due:  6/21/14    Plan discussed with: Cesar Doan  provided by:  Carly    NEXT FOLLOW-UP DATE:  6/8    Anemia Management Service  Deb Zamora PharmD and Pippa Giron CPhT  Phone: [phone]  Fax: [phone]

## 2017-05-25 NOTE — NURSING NOTE
"Chief Complaint   Patient presents with     RECHECK     Follow up CKd stage 5.       Initial BP 94/59  Pulse 92  Temp 97.8  F (36.6  C) (Oral)  Ht 1.88 m (6' 2\")  Wt 101.7 kg (224 lb 3.2 oz)  SpO2 97%  BMI 28.79 kg/m2 Estimated body mass index is 28.79 kg/(m^2) as calculated from the following:    Height as of this encounter: 1.88 m (6' 2\").    Weight as of this encounter: 101.7 kg (224 lb 3.2 oz).  Medication Reconciliation: complete   Itzel Morataya., CMA    "

## 2017-05-25 NOTE — MR AVS SNAPSHOT
After Visit Summary   5/25/2017    Cesar Villalobos    MRN: [id number]           Patient Information     Date Of Birth          1965        Visit Information        Provider Department      5/25/2017 2:30 PM Laurie Kate RD [facility] Solid Organ Transplant        Today's Diagnoses     Organ transplant candidate    -  1    Chronic kidney disease, stage 5 (H)           Follow-ups after your visit        Your next 10 appointments already scheduled     Jun 21, 2017  8:00 AM CDT   Lab with  LAB   [facility] Lab ([facility])    [address] [place] [id number] [phone]            Jun 21, 2017  9:05 AM CDT   (Arrive by 8:35 AM)   Return Visit with Asya Perez MD   [facility] Nephrology ([facility])    [address] [place] [address] [phone]              Who to contact     If you have questions or need follow up information about today's clinic visit or your schedule please contact [facility] SOLID ORGAN TRANSPL[facility] directly at [phone].  Normal or non-critical lab and imaging results will be communicated to you by [employer], letter or phone within 4 business days after the clinic has received the results. If you do not hear from us within 7 days, please contact the clinic through [employer] or phone. If you have a critical or abnormal lab result, we will notify you by phone as soon as possible.  Submit refill requests through [employer] or call your pharmacy and they will forward the refill request to us. Please allow 3 business days for your refill to be completed.          Additional Information About Your Visit        [employer] Information     [employer] gives you secure access to your electronic health record. If you see a primary care provider, you can also send messages to your care team and make appointments. If you have questions, please call your primary care clinic.  If you  do not have a primary care provider, please call [phone] and they will assist you.        Care EveryWhere ID     This is your Care EveryWhere ID. This could be used by other organizations to access your [place] medical records  [id number]         Blood Pressure from Last 3 Encounters:   05/25/17 94/59   05/08/17 121/77   01/18/17 121/80    Weight from Last 3 Encounters:   05/25/17 101.7 kg (224 lb 3.2 oz)   01/18/17 103.1 kg (227 lb 6.4 oz)   08/30/16 100.8 kg (222 lb 4.8 oz)              Today, you had the following     No orders found for display         Today's Medication Changes          These changes are accurate as of: 5/25/17  3:49 PM.  If you have any questions, ask your nurse or doctor.               Start taking these medicines.        Dose/Directions    pramipexole 0.125 MG tablet   Commonly known as:  MIRAPEX   Used for:  Restless leg syndrome, CKD (chronic kidney disease) stage 5, GFR less than 15 ml/min (H)   Started by:  Asya Perez MD        Dose:  0.125 mg   Take 1 tablet (0.125 mg) by mouth At Bedtime   Quantity:  30 tablet   Refills:  3         These medicines have changed or have updated prescriptions.        Dose/Directions    sodium bicarbonate 650 MG tablet   This may have changed:  when to take this   Used for:  CKD (chronic kidney disease) stage 5, GFR less than 15 ml/min (H)   Changed by:  Asya Perez MD        Dose:  1300 mg   Take 2 tablets (1,300 mg) by mouth 3 times daily   Quantity:  120 tablet   Refills:  11         Stop taking these medicines if you haven't already. Please contact your care team if you have questions.     lisinopril 20 MG tablet   Commonly known as:  PRINIVIL/ZESTRIL   Stopped by:  Asya Perez MD                Where to get your medicines      These medications were sent to [id number] - [place], [place] - [address] AT [facility]  [address], [place] [address]    Hours:   24-hours Phone:  [phone]     pramipexole 0.125 MG tablet    sodium bicarbonate 650 MG tablet                Primary Care Provider    None Specified       No primary provider on file.        Thank you!     Thank you for choosing [facility]  for your care. Our goal is always to provide you with excellent care. Hearing back from our patients is one way we can continue to improve our services. Please take a few minutes to complete the written survey that you may receive in the mail after your visit with us. Thank you!             Your Updated Medication List - Protect others around you: Learn how to safely use, store and throw away your medicines at www.disposemymeds.org.          This list is accurate as of: 5/25/17  3:49 PM.  Always use your most recent med list.                   Brand Name Dispense Instructions for use    atorvastatin 10 MG tablet    LIPITOR    90 tablet    Take 1 tablet (10 mg) by mouth daily       BENADRYL PO      Take 0.25 mg by mouth nightly as needed       calcitRIOL 0.5 MCG capsule    ROCALTROL    180 capsule    Take 2 capsules (1 mcg) by mouth daily       calcium carbonate 500 MG chewable tablet    TUMS     Take 2 chew tab by mouth daily       DILT- MG 24 hr capsule   Generic drug:  diltiazem     90 capsule    TAKE 1 CAPSULE BY MOUTH EVERY DAY       ferrous sulfate 325 (65 FE) MG tablet    IRON    100 tablet    Take 1 tablet (325 mg) by mouth daily (with breakfast)       fish oil-omega-3 fatty acids 1000 MG capsule      Take 1 capsule by mouth daily.       pramipexole 0.125 MG tablet    MIRAPEX    30 tablet    Take 1 tablet (0.125 mg) by mouth At Bedtime       sodium bicarbonate 650 MG tablet     120 tablet    Take 2 tablets (1,300 mg) by mouth 3 times daily       vitamin D 2000 UNITS Caps      Take 4,000 Units by mouth daily

## 2017-05-25 NOTE — NURSING NOTE
Frequency: Once  Most recent or today's HGB: 9.1   Date: 2017    Blood Pressure:105/65    Diagnosis: Anemia in CKD stage 5.    Ordered by: Dr. Asya Perez MD  VIS Offered: yes    Double Checked by: Ted landaverde    See MAR for administration details    Pt's first name, last name and  verified prior to medication administration, injection given without complications or questions.   Itzel Morataya., CMA

## 2017-05-26 ENCOUNTER — CARE COORDINATION (OUTPATIENT)
Dept: NEPHROLOGY | Facility: CLINIC | Age: 52
End: 2017-05-26

## 2017-05-26 DIAGNOSIS — N18.5 CKD (CHRONIC KIDNEY DISEASE) STAGE 5, GFR LESS THAN 15 ML/MIN (H): Primary | ICD-10-CM

## 2017-05-26 LAB — DEPRECATED CALCIDIOL+CALCIFEROL SERPL-MC: 38 UG/L (ref 20–75)

## 2017-05-26 NOTE — PROGRESS NOTES
Patient seen by Dr. Perez in clinic yesterday and plan is for patient to start PD in the coming week due to worsening uremic symptoms.     - Called patient to discuss. Will refer to [facility] unit.  - Patient will be followed by Dr. Muñiz for PD- she is aware  - Patient will get Hep B and TB labs drawn next week  - PD cath placement is scheduled for 6/6  - Informed patient that a nurse from the PD unit will be calling him to finalize a schedule for his first dressing change and for training    Patient had no questions at this time.    Amaury Vazquez RN

## 2017-06-02 ENCOUNTER — TELEPHONE (OUTPATIENT)
Dept: NEPHROLOGY | Facility: CLINIC | Age: 52
End: 2017-06-02

## 2017-06-02 ENCOUNTER — RADIANT APPOINTMENT (OUTPATIENT)
Dept: GENERAL RADIOLOGY | Facility: CLINIC | Age: 52
End: 2017-06-02
Attending: FAMILY MEDICINE
Payer: COMMERCIAL

## 2017-06-02 ENCOUNTER — OFFICE VISIT (OUTPATIENT)
Dept: FAMILY MEDICINE | Facility: CLINIC | Age: 52
End: 2017-06-02
Payer: COMMERCIAL

## 2017-06-02 ENCOUNTER — TELEPHONE (OUTPATIENT)
Dept: URGENT CARE | Facility: URGENT CARE | Age: 52
End: 2017-06-02

## 2017-06-02 VITALS
DIASTOLIC BLOOD PRESSURE: 75 MMHG | TEMPERATURE: 98.2 F | BODY MASS INDEX: 27.86 KG/M2 | SYSTOLIC BLOOD PRESSURE: 121 MMHG | OXYGEN SATURATION: 97 % | HEART RATE: 84 BPM | RESPIRATION RATE: 20 BRPM | WEIGHT: 217 LBS

## 2017-06-02 DIAGNOSIS — N18.5 CKD (CHRONIC KIDNEY DISEASE) STAGE 5, GFR LESS THAN 15 ML/MIN (H): ICD-10-CM

## 2017-06-02 DIAGNOSIS — D63.1 ANEMIA IN STAGE 5 CHRONIC KIDNEY DISEASE (H): ICD-10-CM

## 2017-06-02 DIAGNOSIS — Z01.818 PREOP GENERAL PHYSICAL EXAM: ICD-10-CM

## 2017-06-02 DIAGNOSIS — Z01.818 PREOP GENERAL PHYSICAL EXAM: Primary | ICD-10-CM

## 2017-06-02 DIAGNOSIS — N18.5 ANEMIA IN STAGE 5 CHRONIC KIDNEY DISEASE (H): ICD-10-CM

## 2017-06-02 LAB
ALBUMIN SERPL-MCNC: 4 G/DL (ref 3.4–5)
ALP SERPL-CCNC: 48 U/L (ref 40–150)
ALT SERPL W P-5'-P-CCNC: 20 U/L (ref 0–70)
ANION GAP SERPL CALCULATED.3IONS-SCNC: 15 MMOL/L (ref 3–14)
AST SERPL W P-5'-P-CCNC: 14 U/L (ref 0–45)
BILIRUB SERPL-MCNC: 0.3 MG/DL (ref 0.2–1.3)
BUN SERPL-MCNC: 99 MG/DL (ref 7–30)
CALCIUM SERPL-MCNC: 11.3 MG/DL (ref 8.5–10.1)
CHLORIDE SERPL-SCNC: 97 MMOL/L (ref 94–109)
CO2 SERPL-SCNC: 24 MMOL/L (ref 20–32)
CREAT SERPL-MCNC: 9.77 MG/DL (ref 0.66–1.25)
GFR SERPL CREATININE-BSD FRML MDRD: 6 ML/MIN/1.7M2
GLUCOSE SERPL-MCNC: 152 MG/DL (ref 70–99)
HBV CORE AB SERPL QL IA: NONREACTIVE
HBV SURFACE AB SERPL IA-ACNC: 0.22 M[IU]/ML
HBV SURFACE AG SERPL QL IA: NONREACTIVE
HCT VFR BLD AUTO: 26.3 % (ref 40–53)
HGB BLD-MCNC: 8.7 G/DL (ref 13.3–17.7)
IRON SATN MFR SERPL: 20 % (ref 15–46)
IRON SERPL-MCNC: 69 UG/DL (ref 35–180)
MAGNESIUM SERPL-MCNC: 2.7 MG/DL (ref 1.6–2.3)
PHOSPHATE SERPL-MCNC: 6.4 MG/DL (ref 2.5–4.5)
POTASSIUM SERPL-SCNC: 4.5 MMOL/L (ref 3.4–5.3)
PROT SERPL-MCNC: 7 G/DL (ref 6.8–8.8)
SODIUM SERPL-SCNC: 136 MMOL/L (ref 133–144)
TIBC SERPL-MCNC: 346 UG/DL (ref 240–430)

## 2017-06-02 PROCEDURE — 85018 HEMOGLOBIN: CPT | Performed by: FAMILY MEDICINE

## 2017-06-02 PROCEDURE — 71020 XR CHEST 2 VW: CPT

## 2017-06-02 PROCEDURE — 83550 IRON BINDING TEST: CPT | Performed by: FAMILY MEDICINE

## 2017-06-02 PROCEDURE — 93000 ELECTROCARDIOGRAM COMPLETE: CPT | Performed by: FAMILY MEDICINE

## 2017-06-02 PROCEDURE — 83735 ASSAY OF MAGNESIUM: CPT | Performed by: FAMILY MEDICINE

## 2017-06-02 PROCEDURE — 86480 TB TEST CELL IMMUN MEASURE: CPT | Performed by: FAMILY MEDICINE

## 2017-06-02 PROCEDURE — 85014 HEMATOCRIT: CPT | Performed by: FAMILY MEDICINE

## 2017-06-02 PROCEDURE — 86704 HEP B CORE ANTIBODY TOTAL: CPT | Performed by: FAMILY MEDICINE

## 2017-06-02 PROCEDURE — 87340 HEPATITIS B SURFACE AG IA: CPT | Performed by: FAMILY MEDICINE

## 2017-06-02 PROCEDURE — 86706 HEP B SURFACE ANTIBODY: CPT | Performed by: FAMILY MEDICINE

## 2017-06-02 PROCEDURE — 84100 ASSAY OF PHOSPHORUS: CPT | Performed by: FAMILY MEDICINE

## 2017-06-02 PROCEDURE — 36415 COLL VENOUS BLD VENIPUNCTURE: CPT | Performed by: FAMILY MEDICINE

## 2017-06-02 PROCEDURE — 80053 COMPREHEN METABOLIC PANEL: CPT | Performed by: FAMILY MEDICINE

## 2017-06-02 PROCEDURE — 83540 ASSAY OF IRON: CPT | Performed by: FAMILY MEDICINE

## 2017-06-02 PROCEDURE — 99214 OFFICE O/P EST MOD 30 MIN: CPT | Performed by: FAMILY MEDICINE

## 2017-06-02 NOTE — PATIENT INSTRUCTIONS
Before Your Surgery      Call your surgeon if there is any change in your health. This includes signs of a cold or flu (such as a sore throat, runny nose, cough, rash or fever).    Do not smoke, drink alcohol or take over the counter medicine (unless your surgeon or primary care doctor tells you to) for the 24 hours before and after surgery.    If you take prescribed drugs: Follow your doctor s orders about which medicines to take and which to stop until after surgery.--hold all meds the morning of surgery, re-start them later that day, or as directed by your specialist.    Eating and drinking prior to surgery: follow the instructions from your surgeon    Take a shower or bath the night before surgery. Use the soap your surgeon gave you to gently clean your skin. If you do not have soap from your surgeon, use your regular soap. Do not shave or scrub the surgery site.  Wear clean pajamas and have clean sheets on your bed.

## 2017-06-02 NOTE — NURSING NOTE
"Chief Complaint   Patient presents with     Pre-Op Exam       Initial /75 (BP Location: Left arm, Patient Position: Chair, Cuff Size: Adult Regular)  Pulse 84  Temp 98.2  F (36.8  C) (Oral)  Resp 20  Wt 217 lb (98.4 kg)  SpO2 97%  BMI 27.86 kg/m2 Estimated body mass index is 27.86 kg/(m^2) as calculated from the following:    Height as of 5/25/17: 6' 2\" (1.88 m).    Weight as of this encounter: 217 lb (98.4 kg).  Medication Reconciliation: unable or not appropriate to perform         Samra Pedro MA      "

## 2017-06-02 NOTE — TELEPHONE ENCOUNTER
Lab informed us of Critical value of Creatinine. I informed on call Doctor, Dr Helton.   Pt is having surgery on 6/6/17, Dr Helton stated this was to be expected for this CKD.  mikhail jimenez

## 2017-06-02 NOTE — TELEPHONE ENCOUNTER
Call to patient to remind him to get labs drawn before surgery on Tuesday (Hep and TB). Patient will try to have drawn today at prop appointment at [place].  Trinidad Munguia LPN  Nephrology  [facility]  [phone]

## 2017-06-02 NOTE — PROGRESS NOTES
[address] [address]  [phone]  Dept: [phone]    PRE-OP EVALUATION:  Today's date: 2017    Cesar Villalobos (: 1965) presents for pre-operative evaluation assessment as requested by Dr. Tay.  He requires evaluation and anesthesia risk assessment prior to undergoing surgery/procedure for treatment of dialysis catherter .  Proposed procedure: dialysis catheter peritoneal     Date of Surgery/ Procedure: 2017  Time of Surgery/ Procedure: 8:30  Hospital/Surgical Facility: [facility]  Primary Physician: No primary care provider on file.  Type of Anesthesia Anticipated: to be determined    Patient has a Health Care Directive or Living Will:  NO    1. NO - Do you have a history of heart attack, stroke, stent, bypass or surgery on an artery in the head, neck, heart or legs?  2. NO - Do you ever have any pain or discomfort in your chest?  3. NO - Do you have a history of  Heart Failure?  4. YES - ARE YOUR TROUBLED BY SHORTNESS OF BREATH WHEN WALKING ON THE LEVEL, UP A SLIGHT HILL OR AT NIGHT? Pt is anemic   5. NO - Do you currently have a cold, bronchitis or other respiratory infection?  6. NO - Do you have a cough, shortness of breath or wheezing?  7. NO - Do you sometimes get pains in the calves of your legs when you walk?  8. YES - DO YOU OR ANYONE IN YOUR FAMILY HAVE PREVIOUS HISTORY OF BLOOD CLOTS? Pt's mother   9. NO - Do you or does anyone in your family have a serious bleeding problem such as prolonged bleeding following surgeries or cuts?  10. YES - HAVE YOU EVER HAD PROBLEMS WITH ANEMIA OR BEEN TOLD TO TAKE IRON PILLS? Pt's anemic   11. NO - Have you had any abnormal blood loss such as black, tarry or bloody stools, or abnormal vaginal bleeding?  12. NO - Have you ever had a blood transfusion?  13. NO - Have you or any of your relatives ever had problems with anesthesia?  14. NO - Do you have sleep apnea, excessive snoring or daytime  drowsiness?  15. NO - Do you have any prosthetic heart valves?  16. NO - Do you have prosthetic joints?  17. NO - Is there any chance that you may be pregnant?      HPI:                                                      Brief HPI related to upcoming procedure:     ESRD stage 5: the patient reports that he was in his usual state of good health when he went in for a routine physical and was found to be in kidney failure.  The etiology is unclear.  His brother also has renal failure, but his is thought to be due to radiation therapy for a tumor.  The patient still makes urine.  He denies edema.  He is quite fatigued and feels generally weak.  He hasn't been able to tolerate exercise, which he would like to get back into.  He follows with Dr. Perez of nephrology.  He is scheduled for catheter placement for peritoneal dialysis.      HTN: his lisinopril was stopped due to lower blood pressures; he remains on diltiazem.   HL: he is taking lipitor.  Anemia in chronic kidney disease: hgb last week was 9.2 and he was started on EPO.  He is fatigued and has some dyspnea on exertion.  Vitamin D deficiency: on supplements per his specialist  Metabolic acidosis: he is taking bicarb  RLS: this has been more symptomatic lately.   Insomnia: he takes melatonin.  This is a chronic problem.        Cough x 1 week, dry, started after he had one episode of vomiting.  He has dyspnea with exertion, as above, which he relates to his anemia and kidney disease.  No fevers or night sweats.  No hemoptysis or sputum.  No wheeze.  He has not been a smoker.      He works at [employer].      MEDICAL HISTORY:                                                      Patient Active Problem List    Diagnosis Date Noted     Restless leg syndrome 05/25/2017     Priority: Medium     Anemia of chronic renal failure, stage 5 (H) 05/25/2017     Priority: Medium     Acidosis 08/30/2016     Priority: Medium     Secondary renal hyperparathyroidism (H)  08/30/2016     Priority: Medium     Organ transplant candidate 12/25/2014     CKD (chronic kidney disease) 07/06/2012     CKD (chronic kidney disease) stage 5, GFR less than 15 ml/min (H) 07/06/2012     Hyperlipidemia 07/06/2012     Problem list name updated by automated process. Provider to review        Past Medical History:   Diagnosis Date     Anemia in chronic kidney disease      CKD (chronic kidney disease), stage IV (H)      Dyslipidemia      Hypertension      Past Surgical History:   Procedure Laterality Date     HERNIA REPAIR, INGUINAL RT/LT Left     as child     Current Outpatient Prescriptions   Medication Sig Dispense Refill     MELATONIN PO Take 2 mg by mouth At Bedtime       darbepoetin debra (ARANESP, ALBUMIN FREE,) 60 MCG/0.3ML injection Inject 0.3 mLs (60 mcg) Subcutaneous every 14 days As needed for hgb<10g/dL.  If Hgb increases >1 point in 2 weeks (if blood transfusion given, use hgb PRIOR to this), SYSTOLIC BP > 180 mmHg or hgb>=10g/dL, HOLD DOSE. Dose must be within 1 week of Hgb.  Per anemia protocol with Asya Perez MD/Deb Zamora,PharmD [phone] 0.3 mL 99     sodium bicarbonate 650 MG tablet Take 2 tablets (1,300 mg) by mouth 3 times daily 120 tablet 11     calcitRIOL (ROCALTROL) 0.5 MCG capsule Take 2 capsules (1 mcg) by mouth daily 180 capsule 3     DILT- MG 24 hr ER capsule TAKE 1 CAPSULE BY MOUTH EVERY DAY 90 capsule 3     calcium carbonate (TUMS) 500 MG chewable tablet Take 1 chew tab by mouth 3 times daily Dose is 750mg       atorvastatin (LIPITOR) 10 MG tablet Take 1 tablet (10 mg) by mouth daily 90 tablet 3     ferrous sulfate (IRON) 325 (65 FE) MG tablet Take 1 tablet (325 mg) by mouth daily (with breakfast) 100 tablet 3     Cholecalciferol (VITAMIN D) 2000 UNITS CAPS Take 4,000 Units by mouth daily        fish oil-omega-3 fatty acids (FISH OIL) 1000 MG capsule Take 1 capsule by mouth daily.       OTC products: None, except as noted above    Allergies   Allergen  Reactions     Nsaids      Swelling and Hives        Latex Allergy: NO    Social History   Substance Use Topics     Smoking status: Never Smoker     Smokeless tobacco: Not on file     Alcohol use 0.0 oz/week     0 Standard drinks or equivalent per week      Comment: 2 beers monthly     History   Drug Use No       REVIEW OF SYSTEMS:                                                    Constitutional, neuro, ENT, endocrine, pulmonary, cardiac, gastrointestinal, genitourinary, musculoskeletal, integument and psychiatric systems are negative, except as otherwise noted.    EXAM:                                                    /75 (BP Location: Left arm, Patient Position: Chair, Cuff Size: Adult Regular)  Pulse 84  Temp 98.2  F (36.8  C) (Oral)  Resp 20  Wt 217 lb (98.4 kg)  SpO2 97%  BMI 27.86 kg/m2    GENERAL APPEARANCE: healthy, alert and no distress     EYES: EOMI,  PERRL     HENT: ear canals and TM's normal and nose and mouth without ulcers or lesions     NECK: no adenopathy, no asymmetry, masses, or scars and thyroid normal to palpation     RESP: lungs clear to auscultation - no rales, rhonchi or wheezes     CV: regular rates and rhythm, normal S1 S2, no S3 or S4 and no murmur, click or rub     ABDOMEN:  soft, nontender, no HSM or masses and bowel sounds normal     MS: extremities normal- no gross deformities noted, no evidence of inflammation in joints, FROM in all extremities.     SKIN: no suspicious lesions or rashes     NEURO: Normal strength and tone, sensory exam grossly normal, mentation intact and speech normal     PSYCH: mentation appears normal. and affect normal/bright     LYMPHATICS: No axillary, cervical, or supraclavicular nodes    DIAGNOSTICS:                                                    EKG: appears normal, NSR, normal axis, normal intervals, no acute ST/T changes c/w ischemia, no LVH by voltage criteria  HGB: 8.7, Hct 26.3  CMP-pending  Chest XRay    Recent Labs   Lab Test   05/25/17   1427  05/22/17   1531   04/28/17   1252   09/18/14   0843   HGB  9.3*  9.1*   < >  8.8*   < >  12.1*   PLT  214   --    --   193   < >  207   INR   --    --    --    --    --   0.96   NA  138   --    --   138   < >  138   POTASSIUM  4.9   --    --   4.4   < >  4.2   CR  8.82*   --    --   7.59*   < >  3.21*    < > = values in this interval not displayed.        IMPRESSION:                                                    Reason for surgery/procedure: catheter placement for peritoneal dialysis for ESRD stage V of unknown etiology  Diagnosis/reason for consult: pre-operative evaluation.    The proposed surgical procedure is considered INTERMEDIATE risk.    REVISED CARDIAC RISK INDEX  The patient has the following serious cardiovascular risks for perioperative complications such as (MI, PE, VFib and 3  AV Block):  No serious cardiac risks  INTERPRETATION: 0 risks: Class I (very low risk - 0.4% complication rate)    The patient has the following additional risks for perioperative complications:  No identified additional risks  Cough--will check x-ray for aspiration pneumonia given the history, but afebrile, normal O2 sat and exam make this a lot less likely.      No diagnosis found.    RECOMMENDATIONS:                                                          --Patient will hold his medications on the day of surgery    APPROVAL GIVEN to proceed with proposed procedure, without further diagnostic evaluation       Signed Electronically by: Sallie Olsen MD    Copy of this evaluation report is provided to requesting physician.    [place] Preop Guidelines

## 2017-06-02 NOTE — MR AVS SNAPSHOT
After Visit Summary   6/2/2017    Cesar Villalobos    MRN: [id number]           Patient Information     Date Of Birth          1965        Visit Information        Provider Department      6/2/2017 11:20 AM Sallie Olsen MD [facility]        Today's Diagnoses     Preop general physical exam    -  1    CKD (chronic kidney disease) stage 5, GFR less than 15 ml/min (H)        Anemia in stage 5 chronic kidney disease (H)          Care Instructions      Before Your Surgery      Call your surgeon if there is any change in your health. This includes signs of a cold or flu (such as a sore throat, runny nose, cough, rash or fever).    Do not smoke, drink alcohol or take over the counter medicine (unless your surgeon or primary care doctor tells you to) for the 24 hours before and after surgery.    If you take prescribed drugs: Follow your doctor s orders about which medicines to take and which to stop until after surgery.--hold all meds the morning of surgery, re-start them later that day, or as directed by your specialist.    Eating and drinking prior to surgery: follow the instructions from your surgeon    Take a shower or bath the night before surgery. Use the soap your surgeon gave you to gently clean your skin. If you do not have soap from your surgeon, use your regular soap. Do not shave or scrub the surgery site.  Wear clean pajamas and have clean sheets on your bed.           Follow-ups after your visit        Your next 10 appointments already scheduled     Jun 06, 2017   Procedure with Caden Tay MD   [facility], [place], Same Day Surgery (--)    [address] [facility] [address]   [phone]            Jun 21, 2017  8:00 AM CDT   Lab with  LAB    Health [facility] ([facility])    [address] [address] [phone]            Jun 21, 2017  9:05 AM CDT   (Arrive by 8:35 AM)   Return Visit with Asya Paez  MD Chris   [facility] ([facility])    [address] [address] [phone]            Jun 21, 2017  1:15 PM CDT   (Arrive by 1:00 PM)   Post-Op with RUPA Muñoz   [facility] Solid Organ Transplant ([facility])    [address] [address] [phone]              Who to contact     If you have questions or need follow up information about today's clinic visit or your schedule please contact [facility] directly at [phone].  Normal or non-critical lab and imaging results will be communicated to you by MyChart, letter or phone within 4 business days after the clinic has received the results. If you do not hear from us within 7 days, please contact the clinic through [employer] or phone. If you have a critical or abnormal lab result, we will notify you by phone as soon as possible.  Submit refill requests through [employer] or call your pharmacy and they will forward the refill request to us. Please allow 3 business days for your refill to be completed.          Additional Information About Your Visit        [employer] Information     [employer] gives you secure access to your electronic health record. If you see a primary care provider, you can also send messages to your care team and make appointments. If you have questions, please call your primary care clinic.  If you do not have a primary care provider, please call [phone] and they will assist you.        Care EveryWhere ID     This is your Care EveryWhere ID. This could be used by other organizations to access your [facility] medical records  [id number]        Your Vitals Were     Pulse Temperature Respirations Pulse Oximetry BMI (Body Mass Index)       84 98.2  F (36.8  C) (Oral) 20 97% 27.86 kg/m2        Blood Pressure from Last 3 Encounters:   06/02/17 121/75   05/25/17 94/59   05/08/17 121/77     Weight from Last 3 Encounters:   06/02/17 217 lb (98.4 kg)   05/25/17 224 lb 3.2 oz (101.7 kg)   01/18/17 227 lb 6.4 oz (103.1 kg)              We Performed the Following     Comprehensive metabolic panel     EKG 12-lead complete w/read - Clinics     Hemoglobin and hematocrit     Hepatitis B core antibody     Hepatitis B Surface Antibody     Hepatitis B surface antigen     Iron and iron binding capacity     M Tuberculosis by Quantiferon     Magnesium     Phosphorus        Primary Care Provider Office Phone # Fax #    Sallie Olsen [phone]       [facility] [address] [place] [address]        Thank you!     Thank you for choosing [facility]  for your care. Our goal is always to provide you with excellent care. Hearing back from our patients is one way we can continue to improve our services. Please take a few minutes to complete the written survey that you may receive in the mail after your visit with us. Thank you!             Your Updated Medication List - Protect others around you: Learn how to safely use, store and throw away your medicines at www.disposemymeds.org.          This list is accurate as of: 6/2/17 12:29 PM.  Always use your most recent med list.                   Brand Name Dispense Instructions for use    atorvastatin 10 MG tablet    LIPITOR    90 tablet    Take 1 tablet (10 mg) by mouth daily       calcitRIOL 0.5 MCG capsule    ROCALTROL    180 capsule    Take 2 capsules (1 mcg) by mouth daily       calcium carbonate 500 MG chewable tablet    TUMS     Take 1 chew tab by mouth 3 times daily Dose is 750mg       darbepoetin debra 60 MCG/0.3ML injection    ARANESP (ALBUMIN FREE)    0.3 mL    Inject 0.3 mLs (60 mcg) Subcutaneous every 14 days As needed for hgb<10g/dL.  If Hgb increases >1 point in 2 weeks (if blood transfusion given, use hgb PRIOR to this), SYSTOLIC BP > 180 mmHg or hgb>=10g/dL, HOLD DOSE. Dose must be within 1 week  of Hgb.  Per anemia protocol with Asya MD Chris/Deb Zamora,PharmD [phone]       DILT- MG 24 hr capsule   Generic drug:  diltiazem     90 capsule    TAKE 1 CAPSULE BY MOUTH EVERY DAY       ferrous sulfate 325 (65 FE) MG tablet    IRON    100 tablet    Take 1 tablet (325 mg) by mouth daily (with breakfast)       fish oil-omega-3 fatty acids 1000 MG capsule      Take 1 capsule by mouth daily.       MELATONIN PO      Take 2 mg by mouth At Bedtime       sodium bicarbonate 650 MG tablet     120 tablet    Take 2 tablets (1,300 mg) by mouth 3 times daily       vitamin D 2000 UNITS Caps      Take 4,000 Units by mouth daily

## 2017-06-05 LAB
M TB TUBERC IFN-G BLD QL: NEGATIVE
M TB TUBERC IFN-G/MITOGEN IGNF BLD: 0.01 IU/ML

## 2017-06-05 NOTE — PROGRESS NOTES
Patient got Hep B and TB labs drawn on Friday 6/2. Faxed Hep B results to [facility]. TB results still pending. Will finalize PD training once TB result is back.    Amaury Vazquez RN

## 2017-06-06 ENCOUNTER — ANESTHESIA EVENT (OUTPATIENT)
Dept: SURGERY | Facility: CLINIC | Age: 52
End: 2017-06-06
Payer: COMMERCIAL

## 2017-06-06 ENCOUNTER — HOSPITAL ENCOUNTER (EMERGENCY)
Facility: CLINIC | Age: 52
Discharge: HOME | End: 2017-06-07
Attending: EMERGENCY MEDICINE | Admitting: EMERGENCY MEDICINE
Payer: COMMERCIAL

## 2017-06-06 ENCOUNTER — HOSPITAL ENCOUNTER (OUTPATIENT)
Facility: CLINIC | Age: 52
Discharge: HOME | End: 2017-06-06
Attending: TRANSPLANT SURGERY | Admitting: TRANSPLANT SURGERY
Payer: COMMERCIAL

## 2017-06-06 ENCOUNTER — ANESTHESIA (OUTPATIENT)
Dept: SURGERY | Facility: CLINIC | Age: 52
End: 2017-06-06
Payer: COMMERCIAL

## 2017-06-06 VITALS
TEMPERATURE: 97.3 F | OXYGEN SATURATION: 99 % | SYSTOLIC BLOOD PRESSURE: 136 MMHG | HEIGHT: 74 IN | DIASTOLIC BLOOD PRESSURE: 97 MMHG | WEIGHT: 217.59 LBS | HEART RATE: 71 BPM | BODY MASS INDEX: 27.93 KG/M2 | RESPIRATION RATE: 16 BRPM

## 2017-06-06 DIAGNOSIS — E78.5 HYPERLIPIDEMIA, UNSPECIFIED HYPERLIPIDEMIA TYPE: ICD-10-CM

## 2017-06-06 DIAGNOSIS — G25.81 RESTLESS LEG SYNDROME: ICD-10-CM

## 2017-06-06 DIAGNOSIS — N18.4 CHRONIC KIDNEY DISEASE, STAGE IV (SEVERE) (H): ICD-10-CM

## 2017-06-06 DIAGNOSIS — N25.81 SECONDARY RENAL HYPERPARATHYROIDISM (H): ICD-10-CM

## 2017-06-06 DIAGNOSIS — N18.5 CKD (CHRONIC KIDNEY DISEASE), STAGE 5: Primary | ICD-10-CM

## 2017-06-06 DIAGNOSIS — Z99.2 ENCOUNTER FOR EXTRACORPOREAL DIALYSIS (H): ICD-10-CM

## 2017-06-06 DIAGNOSIS — Z99.2 PERITONEAL DIALYSIS CATHETER IN PLACE (H): ICD-10-CM

## 2017-06-06 DIAGNOSIS — Z76.82 ORGAN TRANSPLANT CANDIDATE: ICD-10-CM

## 2017-06-06 DIAGNOSIS — E87.20 ACIDOSIS: ICD-10-CM

## 2017-06-06 DIAGNOSIS — I12.9 RENAL HYPERTENSION: ICD-10-CM

## 2017-06-06 DIAGNOSIS — R33.9 URINARY RETENTION: ICD-10-CM

## 2017-06-06 DIAGNOSIS — N18.5 CKD (CHRONIC KIDNEY DISEASE) STAGE 5, GFR LESS THAN 15 ML/MIN (H): ICD-10-CM

## 2017-06-06 DIAGNOSIS — D63.1 ANEMIA OF CHRONIC RENAL FAILURE, STAGE 5 (H): ICD-10-CM

## 2017-06-06 DIAGNOSIS — N18.5 ANEMIA OF CHRONIC RENAL FAILURE, STAGE 5 (H): ICD-10-CM

## 2017-06-06 LAB
ABO + RH BLD: NORMAL
ABO + RH BLD: NORMAL
ALBUMIN SERPL-MCNC: 3.6 G/DL (ref 3.4–5)
ALP SERPL-CCNC: 56 U/L (ref 40–150)
ALT SERPL W P-5'-P-CCNC: 27 U/L (ref 0–70)
ANION GAP SERPL CALCULATED.3IONS-SCNC: 12 MMOL/L (ref 3–14)
AST SERPL W P-5'-P-CCNC: 15 U/L (ref 0–45)
BASOPHILS # BLD AUTO: 0 10E9/L (ref 0–0.2)
BASOPHILS NFR BLD AUTO: 0.3 %
BILIRUB SERPL-MCNC: 0.4 MG/DL (ref 0.2–1.3)
BLD GP AB SCN SERPL QL: NORMAL
BLOOD BANK CMNT PATIENT-IMP: NORMAL
BUN SERPL-MCNC: 83 MG/DL (ref 7–30)
CALCIUM SERPL-MCNC: 11.4 MG/DL (ref 8.5–10.1)
CHLORIDE SERPL-SCNC: 100 MMOL/L (ref 94–109)
CO2 SERPL-SCNC: 25 MMOL/L (ref 20–32)
CREAT SERPL-MCNC: 9.58 MG/DL (ref 0.66–1.25)
DIFFERENTIAL METHOD BLD: ABNORMAL
EOSINOPHIL # BLD AUTO: 0.4 10E9/L (ref 0–0.7)
EOSINOPHIL NFR BLD AUTO: 5.9 %
ERYTHROCYTE [DISTWIDTH] IN BLOOD BY AUTOMATED COUNT: 13.6 % (ref 10–15)
GFR SERPL CREATININE-BSD FRML MDRD: 6 ML/MIN/1.7M2
GLUCOSE SERPL-MCNC: 105 MG/DL (ref 70–99)
HCT VFR BLD AUTO: 27 % (ref 40–53)
HGB BLD-MCNC: 8.9 G/DL (ref 13.3–17.7)
IMM GRANULOCYTES # BLD: 0 10E9/L (ref 0–0.4)
IMM GRANULOCYTES NFR BLD: 0.3 %
LYMPHOCYTES # BLD AUTO: 1.3 10E9/L (ref 0.8–5.3)
LYMPHOCYTES NFR BLD AUTO: 21.4 %
MCH RBC QN AUTO: 30.2 PG (ref 26.5–33)
MCHC RBC AUTO-ENTMCNC: 33 G/DL (ref 31.5–36.5)
MCV RBC AUTO: 92 FL (ref 78–100)
MONOCYTES # BLD AUTO: 0.6 10E9/L (ref 0–1.3)
MONOCYTES NFR BLD AUTO: 9.5 %
NEUTROPHILS # BLD AUTO: 3.7 10E9/L (ref 1.6–8.3)
NEUTROPHILS NFR BLD AUTO: 62.6 %
NRBC # BLD AUTO: 0 10*3/UL
NRBC BLD AUTO-RTO: 0 /100
PLATELET # BLD AUTO: 193 10E9/L (ref 150–450)
POTASSIUM SERPL-SCNC: 4.2 MMOL/L (ref 3.4–5.3)
PROT SERPL-MCNC: 7.3 G/DL (ref 6.8–8.8)
RBC # BLD AUTO: 2.95 10E12/L (ref 4.4–5.9)
SODIUM SERPL-SCNC: 137 MMOL/L (ref 133–144)
SPECIMEN EXP DATE BLD: NORMAL
WBC # BLD AUTO: 5.9 10E9/L (ref 4–11)

## 2017-06-06 PROCEDURE — 99283 EMERGENCY DEPT VISIT LOW MDM: CPT | Mod: 25 | Performed by: EMERGENCY MEDICINE

## 2017-06-06 PROCEDURE — 99283 EMERGENCY DEPT VISIT LOW MDM: CPT | Mod: Z6 | Performed by: EMERGENCY MEDICINE

## 2017-06-06 DEVICE — CATH DIALYSIS PERITONEAL FLEX-NECK ARC 54CM CF-5460: Type: IMPLANTABLE DEVICE | Site: ABDOMEN | Status: FUNCTIONAL

## 2017-06-06 RX ORDER — GLYCOPYRROLATE 0.2 MG/ML
INJECTION, SOLUTION INTRAMUSCULAR; INTRAVENOUS PRN
Status: DISCONTINUED | OUTPATIENT
Start: 2017-06-06 | End: 2017-06-06

## 2017-06-06 RX ORDER — LIDOCAINE HYDROCHLORIDE 20 MG/ML
INJECTION, SOLUTION INFILTRATION; PERINEURAL PRN
Status: DISCONTINUED | OUTPATIENT
Start: 2017-06-06 | End: 2017-06-06

## 2017-06-06 RX ORDER — ONDANSETRON 4 MG/1
4 TABLET, ORALLY DISINTEGRATING ORAL EVERY 30 MIN PRN
Status: DISCONTINUED | OUTPATIENT
Start: 2017-06-06 | End: 2017-06-06 | Stop reason: HOSPADM

## 2017-06-06 RX ORDER — CEFAZOLIN SODIUM 2 G/100ML
2 INJECTION, SOLUTION INTRAVENOUS
Status: COMPLETED | OUTPATIENT
Start: 2017-06-06 | End: 2017-06-06

## 2017-06-06 RX ORDER — FENTANYL CITRATE 50 UG/ML
INJECTION, SOLUTION INTRAMUSCULAR; INTRAVENOUS PRN
Status: DISCONTINUED | OUTPATIENT
Start: 2017-06-06 | End: 2017-06-06

## 2017-06-06 RX ORDER — SODIUM CHLORIDE, SODIUM LACTATE, POTASSIUM CHLORIDE, CALCIUM CHLORIDE 600; 310; 30; 20 MG/100ML; MG/100ML; MG/100ML; MG/100ML
INJECTION, SOLUTION INTRAVENOUS CONTINUOUS
Status: DISCONTINUED | OUTPATIENT
Start: 2017-06-06 | End: 2017-06-06 | Stop reason: HOSPADM

## 2017-06-06 RX ORDER — LIDOCAINE 40 MG/G
CREAM TOPICAL
Status: DISCONTINUED | OUTPATIENT
Start: 2017-06-06 | End: 2017-06-06 | Stop reason: HOSPADM

## 2017-06-06 RX ORDER — PROPOFOL 10 MG/ML
INJECTION, EMULSION INTRAVENOUS PRN
Status: DISCONTINUED | OUTPATIENT
Start: 2017-06-06 | End: 2017-06-06

## 2017-06-06 RX ORDER — FENTANYL CITRATE 50 UG/ML
25-50 INJECTION, SOLUTION INTRAMUSCULAR; INTRAVENOUS
Status: DISCONTINUED | OUTPATIENT
Start: 2017-06-06 | End: 2017-06-06 | Stop reason: HOSPADM

## 2017-06-06 RX ORDER — ONDANSETRON 2 MG/ML
4 INJECTION INTRAMUSCULAR; INTRAVENOUS EVERY 30 MIN PRN
Status: DISCONTINUED | OUTPATIENT
Start: 2017-06-06 | End: 2017-06-06 | Stop reason: HOSPADM

## 2017-06-06 RX ORDER — CEFAZOLIN SODIUM 1 G/3ML
1 INJECTION, POWDER, FOR SOLUTION INTRAMUSCULAR; INTRAVENOUS SEE ADMIN INSTRUCTIONS
Status: DISCONTINUED | OUTPATIENT
Start: 2017-06-06 | End: 2017-06-06 | Stop reason: HOSPADM

## 2017-06-06 RX ORDER — ONDANSETRON 2 MG/ML
INJECTION INTRAMUSCULAR; INTRAVENOUS PRN
Status: DISCONTINUED | OUTPATIENT
Start: 2017-06-06 | End: 2017-06-06

## 2017-06-06 RX ORDER — SODIUM CHLORIDE 9 MG/ML
INJECTION, SOLUTION INTRAVENOUS CONTINUOUS
Status: DISCONTINUED | OUTPATIENT
Start: 2017-06-06 | End: 2017-06-06 | Stop reason: HOSPADM

## 2017-06-06 RX ORDER — MEPERIDINE HYDROCHLORIDE 25 MG/ML
12.5 INJECTION INTRAMUSCULAR; INTRAVENOUS; SUBCUTANEOUS
Status: DISCONTINUED | OUTPATIENT
Start: 2017-06-06 | End: 2017-06-06 | Stop reason: HOSPADM

## 2017-06-06 RX ORDER — NALOXONE HYDROCHLORIDE 0.4 MG/ML
.1-.4 INJECTION, SOLUTION INTRAMUSCULAR; INTRAVENOUS; SUBCUTANEOUS
Status: DISCONTINUED | OUTPATIENT
Start: 2017-06-06 | End: 2017-06-06 | Stop reason: HOSPADM

## 2017-06-06 RX ORDER — BUPIVACAINE HYDROCHLORIDE 2.5 MG/ML
INJECTION, SOLUTION INFILTRATION; PERINEURAL PRN
Status: DISCONTINUED | OUTPATIENT
Start: 2017-06-06 | End: 2017-06-06 | Stop reason: HOSPADM

## 2017-06-06 RX ORDER — SODIUM CHLORIDE, SODIUM LACTATE, POTASSIUM CHLORIDE, CALCIUM CHLORIDE 600; 310; 30; 20 MG/100ML; MG/100ML; MG/100ML; MG/100ML
INJECTION, SOLUTION INTRAVENOUS CONTINUOUS PRN
Status: DISCONTINUED | OUTPATIENT
Start: 2017-06-06 | End: 2017-06-06

## 2017-06-06 RX ORDER — HYDROMORPHONE HYDROCHLORIDE 1 MG/ML
.3-.5 INJECTION, SOLUTION INTRAMUSCULAR; INTRAVENOUS; SUBCUTANEOUS EVERY 10 MIN PRN
Status: DISCONTINUED | OUTPATIENT
Start: 2017-06-06 | End: 2017-06-06 | Stop reason: HOSPADM

## 2017-06-06 RX ORDER — OXYCODONE AND ACETAMINOPHEN 5; 325 MG/1; MG/1
1 TABLET ORAL EVERY 8 HOURS PRN
Qty: 10 TABLET | Refills: 0 | Status: SHIPPED | OUTPATIENT
Start: 2017-06-06 | End: 2017-06-09

## 2017-06-06 RX ORDER — NEOSTIGMINE METHYLSULFATE 1 MG/ML
VIAL (ML) INJECTION PRN
Status: DISCONTINUED | OUTPATIENT
Start: 2017-06-06 | End: 2017-06-06

## 2017-06-06 RX ADMIN — CEFAZOLIN SODIUM 2 G: 2 INJECTION, SOLUTION INTRAVENOUS at 08:30

## 2017-06-06 RX ADMIN — PHENYLEPHRINE HYDROCHLORIDE 100 MCG: 10 INJECTION, SOLUTION INTRAMUSCULAR; INTRAVENOUS; SUBCUTANEOUS at 08:56

## 2017-06-06 RX ADMIN — FENTANYL CITRATE 50 MCG: 50 INJECTION, SOLUTION INTRAMUSCULAR; INTRAVENOUS at 09:41

## 2017-06-06 RX ADMIN — PROPOFOL 200 MG: 10 INJECTION, EMULSION INTRAVENOUS at 08:20

## 2017-06-06 RX ADMIN — Medication 5 MG: at 09:39

## 2017-06-06 RX ADMIN — GLYCOPYRROLATE 1 MG: 0.2 INJECTION, SOLUTION INTRAMUSCULAR; INTRAVENOUS at 09:39

## 2017-06-06 RX ADMIN — FENTANYL CITRATE 100 MCG: 50 INJECTION, SOLUTION INTRAMUSCULAR; INTRAVENOUS at 08:34

## 2017-06-06 RX ADMIN — PROPOFOL 50 MG: 10 INJECTION, EMULSION INTRAVENOUS at 09:41

## 2017-06-06 RX ADMIN — FENTANYL CITRATE 50 MCG: 50 INJECTION, SOLUTION INTRAMUSCULAR; INTRAVENOUS at 09:48

## 2017-06-06 RX ADMIN — SODIUM CHLORIDE, POTASSIUM CHLORIDE, SODIUM LACTATE AND CALCIUM CHLORIDE: 600; 310; 30; 20 INJECTION, SOLUTION INTRAVENOUS at 08:11

## 2017-06-06 RX ADMIN — FENTANYL CITRATE 50 MCG: 50 INJECTION, SOLUTION INTRAMUSCULAR; INTRAVENOUS at 08:11

## 2017-06-06 RX ADMIN — MIDAZOLAM HYDROCHLORIDE 2 MG: 1 INJECTION, SOLUTION INTRAMUSCULAR; INTRAVENOUS at 08:11

## 2017-06-06 RX ADMIN — PROPOFOL 50 MG: 10 INJECTION, EMULSION INTRAVENOUS at 09:19

## 2017-06-06 RX ADMIN — FENTANYL CITRATE 50 MCG: 50 INJECTION, SOLUTION INTRAMUSCULAR; INTRAVENOUS at 09:46

## 2017-06-06 RX ADMIN — CISATRACURIUM BESYLATE 2 MG: 2 INJECTION INTRAVENOUS at 09:07

## 2017-06-06 RX ADMIN — LIDOCAINE HYDROCHLORIDE 80 MG: 20 INJECTION, SOLUTION INFILTRATION; PERINEURAL at 08:20

## 2017-06-06 RX ADMIN — ROCURONIUM BROMIDE 50 MG: 10 INJECTION INTRAVENOUS at 08:20

## 2017-06-06 RX ADMIN — ONDANSETRON 4 MG: 2 INJECTION INTRAMUSCULAR; INTRAVENOUS at 09:34

## 2017-06-06 RX ADMIN — FENTANYL CITRATE 100 MCG: 50 INJECTION, SOLUTION INTRAMUSCULAR; INTRAVENOUS at 08:20

## 2017-06-06 NOTE — ANESTHESIA CARE TRANSFER NOTE
Patient: Cesar Villalobos    Procedure(s):  Laparoscopic Peritoneal Dialysis Catheter Placement.  - Wound Class: I-Clean    Diagnosis: Chronic Kidney Disease   Diagnosis Additional Information: No value filed.    Anesthesia Type:   General, ETT     Note:  Airway :Nasal Cannula  Patient transferred to:PACU        Vitals: (Last set prior to Anesthesia Care Transfer)    CRNA VITALS  6/6/2017 0932 - 6/6/2017 1007      6/6/2017             Pulse: 88    SpO2: 91 %    Resp Rate (set): 10                Electronically Signed By: RUPA Champagne CRNA  June 6, 2017  10:07 AM

## 2017-06-06 NOTE — IP AVS SNAPSHOT
Post Anesthesia Care Unit [address] [address]    Phone:  [phone]                                       After Visit Summary   6/6/2017    Cesar Villalobos    MRN: [id number]           After Visit Summary Signature Page     I have received my discharge instructions, and my questions have been answered. I have discussed any challenges I see with this plan with the nurse or doctor.    ..........................................................................................................................................  Patient/Patient Representative Signature      ..........................................................................................................................................  Patient Representative Print Name and Relationship to Patient    ..................................................               ................................................  Date                                            Time    ..........................................................................................................................................  Reviewed by Signature/Title    ...................................................              ..............................................  Date                                                            Time

## 2017-06-06 NOTE — ANESTHESIA POSTPROCEDURE EVALUATION
Patient: Cesar Villalobos    Procedure(s):  Laparoscopic Peritoneal Dialysis Catheter Placement.  - Wound Class: I-Clean    Diagnosis:Chronic Kidney Disease   Diagnosis Additional Information: No value filed.    Anesthesia Type:  General, ETT    Note:  Anesthesia Post Evaluation    Patient location during evaluation: PACU  Patient participation: Able to fully participate in evaluation  Level of consciousness: awake  Pain management: adequate  Airway patency: patent  Cardiovascular status: acceptable  Respiratory status: acceptable  Hydration status: acceptable  PONV: none     Anesthetic complications: None          Last vitals:  Vitals:    06/06/17 1015 06/06/17 1030 06/06/17 1045   BP: 144/84 137/84 130/88   Pulse: 71     Resp: 16 16 16   Temp:  36.7  C (98  F)    SpO2: 96% 92% 98%         Electronically Signed By: Canelo Shaffer MD  June 6, 2017  10:59 AM

## 2017-06-06 NOTE — ANESTHESIA PREPROCEDURE EVALUATION
Anesthesia Evaluation     . Pt has had prior anesthetic.     No history of anesthetic complications          ROS/MED HX    ENT/Pulmonary:  - neg pulmonary ROS     Neurologic:  - neg neurologic ROS     Cardiovascular:     (+) hypertension----. : . . . :. .       METS/Exercise Tolerance:  >4 METS   Hematologic:     (+) Anemia, -      Musculoskeletal:         GI/Hepatic:         Renal/Genitourinary:     (+) chronic renal disease, type: ESRD, Pt does not require dialysis,       Endo:  - neg endo ROS       Psychiatric:         Infectious Disease:  - neg infectious disease ROS       Malignancy:      - no malignancy   Other:    - neg other ROS                 Physical Exam  Normal systems: dental    Airway   Mallampati: I  TM distance: >3 FB  Neck ROM: full    Dental     Cardiovascular   Rhythm and rate: regular      Pulmonary    breath sounds clear to auscultation                    Anesthesia Plan      History & Physical Review  History and physical reviewed and following examination; no interval change.    ASA Status:  3 .    NPO Status:  > 8 hours    Plan for General and ETT with Intravenous induction. Maintenance will be Balanced.    PONV prophylaxis:  Ondansetron (or other 5HT-3)       Postoperative Care  Postoperative pain management:  IV analgesics.      Consents  Anesthetic plan, risks, benefits and alternatives discussed with:  Patient.  Use of blood products discussed: Yes.   Use of blood products discussed with Patient.  Consented to blood products.  .                          .

## 2017-06-06 NOTE — DISCHARGE INSTRUCTIONS
General acute hospital  Same-Day Surgery   Adult Discharge Orders & Instructions     For 24 hours after surgery    1. Get plenty of rest.  A responsible adult must stay with you for at least 24 hours after you leave the hospital.   2. Do not drive or use heavy equipment.  If you have weakness or tingling, don't drive or use heavy equipment until this feeling goes away.  3. Do not drink alcohol.  4. Avoid strenuous or risky activities.  Ask for help when climbing stairs.   5. You may feel lightheaded.  IF so, sit for a few minutes before standing.  Have someone help you get up.   6. If you have nausea (feel sick to your stomach): Drink only clear liquids such as apple juice, ginger ale, broth or 7-Up.  Rest may also help.  Be sure to drink enough fluids.  Move to a regular diet as you feel able.  7. You may have a slight fever. Call the doctor if your fever is over 100.5 F (37.7 C) (taken under the tongue) or lasts longer than 24 hours.  8. You may have a dry mouth, a sore throat, muscle aches or trouble sleeping.  These should go away after 24 hours.  9. Do not make important or legal decisions.   Call your doctor for any of the followin.  Signs of infection (fever, growing tenderness at the surgery site, a large amount of drainage or bleeding, severe pain, foul-smelling drainage, redness, swelling).    2. It has been over 8 to 10 hours since surgery and you are still not able to urinate (pass water).    3.  Headache for over 24 hours.      To contact a doctor, call Dr Tay's office at [phone] or:        [phone] and ask for the resident on call for Nephrology (answered 24 hours a day)      Emergency Department:    [facility]: [phone]       (TTY for hearing impaired: [phone])

## 2017-06-06 NOTE — OP NOTE
Transplant Surgery  Operative Note    Preop Dx:  Chronic renal failure.  Postop Dx: Same  Procedure: Laparoscopic PD catheter placement.   Surgeon: Caden Tay M.D., Ph.D.  Fellow: Aleksandra Vieira fellow,  Joslyn Thomason fellow.  There was no resident available to assist with the procedure.   Anesthesia: General.  EBL: 5 ml  Fluids: 300 cc crystalloid  UO: 400 ml  Drains: None.  Specimen: None.  Complications: None.  Findings: Normal. Catheter in good position with good flow.   Indication: The patient has Chronic kidney failure and is in need of permanent dialysis access.  After discussing the risks and benefits of proceeding, the patient agreed to proceed with surgery and provided informed consent.     Procedure: The patient was brought to the operating room and placed supine on the operating table.  We used the stencil from the kit to roel the catheter course and exit site.  After induction of general anesthesia, the abdomen was prepped and draped in the usual fashion.  A granados catheter and orogastric tube were placed to decompress the bladder and stomach. A time-out was performed to ensure the correct patient and procedure.  Perioperative antibiotics were given.  A 5 mm trocar was placed  in the left paramedian space lateral to the umbilicus, over which a port was advanced.  We insufflated the abdomen to 8 mmHg and performed a diagnostic laparoscopy with the 5mm camera.  The laparoscopic survey did not reveal any abnormalities. A 7-8 mm Step port expander sheath was placed over a Veress needle and under direct visualization, the Veress needle was walked caudad along the left posterior rectus sheath.  Then we entered the peritoneal cavity approximately 4 cm caudad to the skin incision.  We removed the Veress needle and dilated the sheath with the port, loaded the catheter on a stylet and inserted it through the port down into the true pelvis, taking care to maintain correct orientation of the catheter.  At  this point, we removed the stylet. Care was taken to make sure that the distal cuff remained outside the peritoneal space. During pulling the catheter out it broke down. We have again placed Step port expander sheath over a Veress needle and under direct visualization, the Veress needle was walked caudad along the left posterior rectus sheath. We removed the Veress needle and dilated the sheath. First we removed the previous PD catheter from peritoneal cavity and then loaded the new catheter on a stylet and inserted it through the port down into the true pelvis, taking care to maintain correct orientation of the catheter.  At this point, we removed the stylet. Care was taken to make sure that the distal cuff remained outside the peritoneal space.  The abdomen was desufflated.  We used the Jason tunneling device to externalize the catheter out the previously marked site. We connected the end of the catheter to a liter of saline and allowed it to run in.  It ran in easily.  We placed the bag on the floor and by gravity drainage the fluid drained easily for a near complete evacuation of non-bloody saline.       All the ports were removed.  The skin incisions were closed with Monocryl and Dermabond and the sterile extension set and minicap was placed on the end of the catheter.  A sterile occlusive dressing was applied over the catheter exit site.  All needle and sponge counts were correct x 2.  The patient was awakened from anesthesia and transported to the recovery room, having tolerated the procedure well.  There were no apparent complications.   Faculty was present for the critical portions of the procedure.

## 2017-06-06 NOTE — IP AVS SNAPSHOT
MRN:[id number]                      After Visit Summary   6/6/2017    Cesar Villalobos    MRN: [id number]           Thank you!     Thank you for choosing [facility] for your care. Our goal is always to provide you with excellent care. Hearing back from our patients is one way we can continue to improve our services. Please take a few minutes to complete the written survey that you may receive in the mail after you visit with us. Thank you!        Patient Information     Date Of Birth          1965        About your hospital stay     You were admitted on:  June 6, 2017 You last received care in the:  Post Anesthesia Care Unit [facility]    You were discharged on:  June 6, 2017        Reason for your hospital stay       Laparoscopic PD catheter insertion                  Who to Call     For medical emergencies, please call 911.  For non-urgent questions about your medical care, please call your primary care provider or clinic, [phone]  For questions related to your surgery, please call your surgery clinic        Attending Provider     Provider Caden Basurto MD Transplant       Primary Care Provider Office Phone # Fax #    Sallie Olsen [phone]      After Care Instructions     Discharge Instructions       Peritoneal Dialysis Catheter post op discharge order/instructions   A peritoneal dialysis catheter has been placed into your abdomen dialysis access. The following are instructions for care of your catheter:  Keep your catheter insertion sites dry and covered at all times. To prevent infection, DO NOT REMOVE DRESSING OR PEEK UNDER THE DRESSING. The home dialysis nurse will change your dressing the first time. You will be trained on how to change your dressing.  If the dressing becomes wet from any drainage, you may add a 4x4 sterile gauze and reinforce with tape and contact your physician/nurse practitioner/home dialysis nurse in your area to  let them know about the drainage.   DO NOT take a shower while the catheter site is healing. This usually takes 2-4 weeks. Take sponge baths only. You CANNOT ever take a tub bath or swimming while you have a peritoneal dialysis catheter.  The catheter should be oriented appropriately at all times. This is done by having the catheter taped down to your skin to avoid tugging or pulling.   Activity guidelines:  Do not lift anything over 15 lbs for the first month  Avoid climbing stairs, if at all possible, during the first month of healing   Avoid getting constipated or straining with your bowel movements  Take your medicines as ordered by your doctor. Continue taking stool softeners while taking pain medications.  Continue taking your renal diet.  Call the home dialysis unit in your area to schedule an appointment to have your dressing changed and education on the care of your catheter.  Call the clinic/[facility] ([phone]) to schedule a follow up appointment within the next two weeks  CALL YOUR HEALTH CARE PROVIDER RIGHT AWAY IF YOU HAVE ANY OF THE FOLLOWING:  Fever of 100.5 F (38.0 C) or higher  Chills  Pain in your abdomen or around your catheter that is not controlled by pain medications.  Hardness, warmth, redness or drainage around you catheter or around any incisions.  Block flow into or out of your catheter  Dialysate that is cloudy or bloody when it drains from your body  Part of your abdomen appears to be bulging out more than normal.  Severe coughing                  Your next 10 appointments already scheduled     Jun 21, 2017  8:00 AM CDT   Lab with  LAB   [facility] ([facility])    [address] [place] [id number] [phone]            Jun 21, 2017  9:05 AM CDT   (Arrive by 8:35 AM)   Return Visit with Asya Perez MD   [facility] Nephrology ([facility])    [address] [place] [id number]    [phone]            2017  1:15 PM CDT   (Arrive by 1:00 PM)   Post-Op with RUPA Muñoz On license of [facility] Organ Transplant ([facility])    [address] [facility]  [address] [facility] [id number] [phone]              Further instructions from your care team       [facility]  Same-Day Surgery   Adult Discharge Orders & Instructions     For 24 hours after surgery    1. Get plenty of rest.  A responsible adult must stay with you for at least 24 hours after you leave the hospital.   2. Do not drive or use heavy equipment.  If you have weakness or tingling, don't drive or use heavy equipment until this feeling goes away.  3. Do not drink alcohol.  4. Avoid strenuous or risky activities.  Ask for help when climbing stairs.   5. You may feel lightheaded.  IF so, sit for a few minutes before standing.  Have someone help you get up.   6. If you have nausea (feel sick to your stomach): Drink only clear liquids such as apple juice, ginger ale, broth or 7-Up.  Rest may also help.  Be sure to drink enough fluids.  Move to a regular diet as you feel able.  7. You may have a slight fever. Call the doctor if your fever is over 100.5 F (37.7 C) (taken under the tongue) or lasts longer than 24 hours.  8. You may have a dry mouth, a sore throat, muscle aches or trouble sleeping.  These should go away after 24 hours.  9. Do not make important or legal decisions.   Call your doctor for any of the followin.  Signs of infection (fever, growing tenderness at the surgery site, a large amount of drainage or bleeding, severe pain, foul-smelling drainage, redness, swelling).    2. It has been over 8 to 10 hours since surgery and you are still not able to urinate (pass water).    3.  Headache for over 24 hours.      To contact a doctor, call Dr Tay's office at [phone] or:        [phone] and ask for the resident on call for  "Nephrology (answered 24 hours a day)      Emergency Department:    [facility]: [phone]       (TTY for hearing impaired: [phone])        Pending Results     No orders found from 6/4/2017 to 6/7/2017.            Admission Information     Date & Time Provider Department Dept. Phone    6/6/2017 Caden Tay MD Post Anesthesia Care Unit [facility] [phone]      Your Vitals Were     Blood Pressure Pulse Temperature Respirations Height Weight    135/93 71 98  F (36.7  C) (Oral) 16 1.88 m (6' 2.02\") 98.7 kg (217 lb 9.5 oz)    Pulse Oximetry BMI (Body Mass Index)                100% 27.93 kg/m2          [employer] Information     [facility] gives you secure access to your electronic health record. If you see a primary care provider, you can also send messages to your care team and make appointments. If you have questions, please call your primary care clinic.  If you do not have a primary care provider, please call [phone] and they will assist you.        Care EveryWhere ID     This is your Care EveryWhere ID. This could be used by other organizations to access your [facility] medical records  [id number]           Review of your medicines      START taking        Dose / Directions    oxyCODONE-acetaminophen 5-325 MG per tablet   Commonly known as:  PERCOCET   Used for:  Peritoneal dialysis catheter in place (H)        Dose:  1 tablet   Take 1 tablet by mouth every 8 hours as needed for pain maximum 3 tablet(s) per day   Quantity:  10 tablet   Refills:  0         CONTINUE these medicines which have NOT CHANGED        Dose / Directions    atorvastatin 10 MG tablet   Commonly known as:  LIPITOR   Used for:  CKD (chronic kidney disease) stage 5, GFR less than 15 ml/min (H)        Dose:  10 mg   Take 1 tablet (10 mg) by mouth daily   Quantity:  90 tablet   Refills:  3       calcitRIOL 0.5 MCG capsule   Commonly known as:  ROCALTROL   Used for:  CKD (chronic kidney disease) stage 3, GFR 30-59 ml/min, " CKD (chronic kidney disease), stage 3 (moderate)        Dose:  1 mcg   Take 2 capsules (1 mcg) by mouth daily   Quantity:  180 capsule   Refills:  3       calcium carbonate 500 MG chewable tablet   Commonly known as:  TUMS   Used for:  CKD (chronic kidney disease) stage 5, GFR less than 15 ml/min (H)        Dose:  1 chew tab   Take 1 chew tab by mouth 3 times daily Dose is 750mg   Refills:  0       darbepoetin debra 60 MCG/0.3ML injection   Commonly known as:  ARANESP (ALBUMIN FREE)   Used for:  Anemia of chronic renal failure, stage 5 (H), CKD (chronic kidney disease) stage 5, GFR less than 15 ml/min (H)        Dose:  60 mcg   Inject 0.3 mLs (60 mcg) Subcutaneous every 14 days As needed for hgb<10g/dL.  If Hgb increases >1 point in 2 weeks (if blood transfusion given, use hgb PRIOR to this), SYSTOLIC BP > 180 mmHg or hgb>=10g/dL, HOLD DOSE. Dose must be within 1 week of Hgb.  Per anemia protocol with Asya Perez MD/Deb Zamora,PharmD [phone]   Quantity:  0.3 mL   Refills:  99       DILT- MG 24 hr capsule   Used for:  HTN (hypertension)   Generic drug:  diltiazem        TAKE 1 CAPSULE BY MOUTH EVERY DAY   Quantity:  90 capsule   Refills:  3       ferrous sulfate 325 (65 FE) MG tablet   Commonly known as:  IRON   Used for:  CKD (chronic kidney disease) stage 5, GFR less than 15 ml/min (H)        Dose:  1 tablet   Take 1 tablet (325 mg) by mouth daily (with breakfast)   Quantity:  100 tablet   Refills:  3       fish oil-omega-3 fatty acids 1000 MG capsule        Dose:  1 capsule   Take 1 capsule by mouth daily.   Refills:  0       MELATONIN PO        Dose:  2 mg   Take 2 mg by mouth At Bedtime   Refills:  0       sodium bicarbonate 650 MG tablet   Used for:  CKD (chronic kidney disease) stage 5, GFR less than 15 ml/min (H)        Dose:  1300 mg   Take 2 tablets (1,300 mg) by mouth 3 times daily   Quantity:  120 tablet   Refills:  11       vitamin D 2000 UNITS Caps        Dose:  4000 Units   Take 4,000  Units by mouth daily   Refills:  0            Where to get your medicines      Some of these will need a paper prescription and others can be bought over the counter. Ask your nurse if you have questions.     Bring a paper prescription for each of these medications     oxyCODONE-acetaminophen 5-325 MG per tablet                Protect others around you: Learn how to safely use, store and throw away your medicines at www.disposemymeds.org.             Medication List: This is a list of all your medications and when to take them. Check marks below indicate your daily home schedule. Keep this list as a reference.      Medications           Morning Afternoon Evening Bedtime As Needed    atorvastatin 10 MG tablet   Commonly known as:  LIPITOR   Take 1 tablet (10 mg) by mouth daily                                calcitRIOL 0.5 MCG capsule   Commonly known as:  ROCALTROL   Take 2 capsules (1 mcg) by mouth daily                                calcium carbonate 500 MG chewable tablet   Commonly known as:  TUMS   Take 1 chew tab by mouth 3 times daily Dose is 750mg                                darbepoetin debra 60 MCG/0.3ML injection   Commonly known as:  ARANESP (ALBUMIN FREE)   Inject 0.3 mLs (60 mcg) Subcutaneous every 14 days As needed for hgb<10g/dL.  If Hgb increases >1 point in 2 weeks (if blood transfusion given, use hgb PRIOR to this), SYSTOLIC BP > 180 mmHg or hgb>=10g/dL, HOLD DOSE. Dose must be within 1 week of Hgb.  Per anemia protocol with Asya Perez MD/Deb Zamora,PharmD [phone]                                DILT- MG 24 hr capsule   TAKE 1 CAPSULE BY MOUTH EVERY DAY   Generic drug:  diltiazem                                ferrous sulfate 325 (65 FE) MG tablet   Commonly known as:  IRON   Take 1 tablet (325 mg) by mouth daily (with breakfast)                                fish oil-omega-3 fatty acids 1000 MG capsule   Take 1 capsule by mouth daily.                                MELATONIN PO    Take 2 mg by mouth At Bedtime                                oxyCODONE-acetaminophen 5-325 MG per tablet   Commonly known as:  PERCOCET   Take 1 tablet by mouth every 8 hours as needed for pain maximum 3 tablet(s) per day                                sodium bicarbonate 650 MG tablet   Take 2 tablets (1,300 mg) by mouth 3 times daily                                vitamin D 2000 UNITS Caps   Take 4,000 Units by mouth daily

## 2017-06-06 NOTE — ED AVS SNAPSHOT
[facility], [place], Emergency Department    [address] [place] [id number]    Phone:  [phone]                                       Cesar Villalobos   MRN: [id number]    Department:  [facility], Emergency Department   Date of Visit:  6/6/2017           After Visit Summary Signature Page     I have received my discharge instructions, and my questions have been answered. I have discussed any challenges I see with this plan with the nurse or doctor.    ..........................................................................................................................................  Patient/Patient Representative Signature      ..........................................................................................................................................  Patient Representative Print Name and Relationship to Patient    ..................................................               ................................................  Date                                            Time    ..........................................................................................................................................  Reviewed by Signature/Title    ...................................................              ..............................................  Date                                                            Time

## 2017-06-06 NOTE — ED AVS SNAPSHOT
[facility], Emergency Department    [address] [address]    Phone:  [phone]                                       Cesar Villalobos   MRN: [id number]    Department:  [facility], Emergency Department   Date of Visit:  6/6/2017           Patient Information     Date Of Birth          1965        Your diagnoses for this visit were:     Urinary retention        You were seen by Layton Gomez MD.        Discharge Instructions       Please make an appointment to follow up with Urology Clinic (phone: [phone]) in 3-5 days     Urinary Retention (Male)  Urinary retention is the medical term for difficulty or inability to pass urine, even though your bladder is full.  Causes  The most common cause of urinary retention in men is the bladder outlet being blocked. This can be due to an enlarged prostate gland or a bladder infection. Certain medicines can also cause this problem. This condition is more likely to occur as men get older.  -  This condition is treated by insertion of a catheter into the bladder to drain the urine. This provides immediate relief. The catheter may need to remain in place for a few days to prevent a recurrence. The catheter has a balloon on the tip which was inflated after insertion. This prevents the catheter from falling out.  Symptoms  Common symptoms of urinary retention include:    Pain (not experienced by everyone)    Frequent urination    Feeling that the bladder is still full after urinating    Incontinence (not being able to control the release of urine)    Swollen abdomen  Treatment  This condition is treated by inserting a tube (catheter) into the bladder to drain the urine. This provides immediate relief. The catheter may need to stay in place for a few days. The catheter has a balloon on the tip, which is inflated after insertion. This prevents the catheter from falling out.  Home care    If you were given antibiotics, take them until they are  used up, or your healthcare provider tells you to stop. It is important to finish the antibiotics even though you feel better. This is to make sure your infection has cleared.    If a catheter was left in place, it is important to keep bacteria from getting into the collection bag. Do not disconnect the catheter from the collection bag.    Use a leg band to secure the drainage tube, so it does not pull on the catheter. Drain the collection bag when it becomes full using the drain spout at the bottom of the bag.    Do not pull on or try to remove your catheter. This will injure your urethra. The catheter must be removed by a healthcare provider.  Follow-up care  Follow up with your healthcare provider, or as advised.  If a catheter was left in place, it can usually be removed within 3 to 7 days. Some conditions require the catheter to stay in longer. Your healthcare provider will tell you when to return to have the catheter removed.  When to seek medical advice  Call your healthcare provider right away if any of these occur:    Fever of 100.4 F (38 C) or higher, or as directed by your healthcare provider    Bladder or lower-abdominal pain or fullness    Abdominal swelling, nausea, vomiting, or back pain    Blood or urine leakage around the catheter    Bloody urine coming from the catheter (if a new symptom)    Weakness, dizziness, or fainting    Confusion or change in usual level of alertness    If a catheter was left in place, return if:    Catheter falls out    Catheter stops draining for 6 hours    7030-9256 [employer]. [address] [place] [address]. All rights reserved. This information is not intended as a substitute for professional medical care. Always follow your healthcare professional's instructions.          Future Appointments        Provider Department Dept Phone Center    6/21/2017 8:00 AM Lab [facility] [phone] [facility]    6/21/2017 9:05 AM MD DAVE Adler  Health Nephrology [phone] [facility]    6/21/2017 1:15 PM RUPA Patrick [facility] Solid Organ Transplant [phone] [facility]      24 Hour Appointment Hotline       To make an appointment at any [facility], call [phone] ([phone]). If you don't have a family doctor or clinic, we will help you find one. [facility] are conveniently located to serve the needs of you and your family.             Review of your medicines      Our records show that you are taking the medicines listed below. If these are incorrect, please call your family doctor or clinic.        Dose / Directions Last dose taken    atorvastatin 10 MG tablet   Commonly known as:  LIPITOR   Dose:  10 mg   Quantity:  90 tablet        Take 1 tablet (10 mg) by mouth daily   Refills:  3        calcitRIOL 0.5 MCG capsule   Commonly known as:  ROCALTROL   Dose:  1 mcg   Quantity:  180 capsule        Take 2 capsules (1 mcg) by mouth daily   Refills:  3        calcium carbonate 500 MG chewable tablet   Commonly known as:  TUMS   Dose:  1 chew tab        Take 1 chew tab by mouth 3 times daily Dose is 750mg   Refills:  0        darbepoetin debra 60 MCG/0.3ML injection   Commonly known as:  ARANESP (ALBUMIN FREE)   Dose:  60 mcg   Quantity:  0.3 mL        Inject 0.3 mLs (60 mcg) Subcutaneous every 14 days As needed for hgb<10g/dL.  If Hgb increases >1 point in 2 weeks (if blood transfusion given, use hgb PRIOR to this), SYSTOLIC BP > 180 mmHg or hgb>=10g/dL, HOLD DOSE. Dose must be within 1 week of Hgb.  Per anemia protocol with Asya Perez MD/Deb Zamora,PharmD [phone]   Refills:  99        DILT- MG 24 hr capsule   Quantity:  90 capsule   Generic drug:  diltiazem        TAKE 1 CAPSULE BY MOUTH EVERY DAY   Refills:  3        ferrous sulfate 325 (65 FE) MG tablet   Commonly known as:  IRON   Dose:  1 tablet   Quantity:  100 tablet        Take 1 tablet (325 mg) by mouth daily (with breakfast)   Refills:  3        fish  oil-omega-3 fatty acids 1000 MG capsule   Dose:  1 capsule        Take 1 capsule by mouth daily.   Refills:  0        MELATONIN PO   Dose:  2 mg        Take 2 mg by mouth At Bedtime   Refills:  0        oxyCODONE-acetaminophen 5-325 MG per tablet   Commonly known as:  PERCOCET   Dose:  1 tablet   Quantity:  10 tablet        Take 1 tablet by mouth every 8 hours as needed for pain maximum 3 tablet(s) per day   Refills:  0        sodium bicarbonate 650 MG tablet   Dose:  1300 mg   Quantity:  120 tablet        Take 2 tablets (1,300 mg) by mouth 3 times daily   Refills:  11        vitamin D 2000 UNITS Caps   Dose:  4000 Units        Take 4,000 Units by mouth daily   Refills:  0                Procedures and tests performed during your visit     Bladder scan and straight cath for urine    Indwelling urinary catheter (Steven)       Orders Needing Specimen Collection     None      Pending Results     No orders found for last 3 day(s).            Pending Culture Results     No orders found for last 3 day(s).            Pending Results Instructions     If you had any lab results that were not finalized at the time of your Discharge, you can call the ED Lab Result RN at [phone]. You will be contacted by this team for any positive Lab results or changes in treatment. The nurses are available 7 days a week from 10A to 6:30P.  You can leave a message 24 hours per day and they will return your call.        Thank you for choosing [facility]       Thank you for choosing [facility] for your care. Our goal is always to provide you with excellent care. Hearing back from our patients is one way we can continue to improve our services. Please take a few minutes to complete the written survey that you may receive in the mail after you visit with us. Thank you!        [employer] Information     [employer] gives you secure access to your electronic health record. If you see a primary care provider, you can also send messages to your care team and  make appointments. If you have questions, please call your primary care clinic.  If you do not have a primary care provider, please call [phone] and they will assist you.        Care EveryWhere ID     This is your Care EveryWhere ID. This could be used by other organizations to access your [facility] medical records  [id number]        After Visit Summary       This is your record. Keep this with you and show to your community pharmacist(s) and doctor(s) at your next visit.

## 2017-06-06 NOTE — PROGRESS NOTES
Faxed TB result and PD cath op note to [place].  Attempted to reach [facility] nurse to confirm that patient is scheduled for cath flush and training; they are currently closed, so will call again tomorrow.    Amaury Vazquez RN

## 2017-06-07 ENCOUNTER — APPOINTMENT (OUTPATIENT)
Dept: GENERAL RADIOLOGY | Facility: CLINIC | Age: 52
End: 2017-06-07
Attending: EMERGENCY MEDICINE
Payer: COMMERCIAL

## 2017-06-07 ENCOUNTER — HOSPITAL ENCOUNTER (EMERGENCY)
Facility: CLINIC | Age: 52
Discharge: HOME | End: 2017-06-08
Attending: EMERGENCY MEDICINE | Admitting: EMERGENCY MEDICINE
Payer: COMMERCIAL

## 2017-06-07 ENCOUNTER — TELEPHONE (OUTPATIENT)
Dept: NEPHROLOGY | Facility: CLINIC | Age: 52
End: 2017-06-07

## 2017-06-07 VITALS
SYSTOLIC BLOOD PRESSURE: 149 MMHG | DIASTOLIC BLOOD PRESSURE: 88 MMHG | TEMPERATURE: 98.4 F | RESPIRATION RATE: 16 BRPM | BODY MASS INDEX: 27.59 KG/M2 | WEIGHT: 215 LBS | HEIGHT: 74 IN | OXYGEN SATURATION: 97 %

## 2017-06-07 VITALS
BODY MASS INDEX: 27.59 KG/M2 | RESPIRATION RATE: 16 BRPM | HEIGHT: 74 IN | SYSTOLIC BLOOD PRESSURE: 137 MMHG | OXYGEN SATURATION: 100 % | HEART RATE: 82 BPM | DIASTOLIC BLOOD PRESSURE: 95 MMHG | WEIGHT: 215 LBS | TEMPERATURE: 98.3 F

## 2017-06-07 DIAGNOSIS — E87.1 HYPONATREMIA: ICD-10-CM

## 2017-06-07 DIAGNOSIS — K59.00 CONSTIPATION, UNSPECIFIED CONSTIPATION TYPE: ICD-10-CM

## 2017-06-07 LAB
ANION GAP SERPL CALCULATED.3IONS-SCNC: 12 MMOL/L (ref 3–14)
BUN SERPL-MCNC: 85 MG/DL (ref 7–30)
CALCIUM SERPL-MCNC: 11.4 MG/DL (ref 8.5–10.1)
CHLORIDE SERPL-SCNC: 92 MMOL/L (ref 94–109)
CO2 SERPL-SCNC: 25 MMOL/L (ref 20–32)
CREAT SERPL-MCNC: 9.07 MG/DL (ref 0.66–1.25)
ERYTHROCYTE [DISTWIDTH] IN BLOOD BY AUTOMATED COUNT: 13.1 % (ref 10–15)
GFR SERPL CREATININE-BSD FRML MDRD: 6 ML/MIN/1.7M2
GLUCOSE SERPL-MCNC: 95 MG/DL (ref 70–99)
HCT VFR BLD AUTO: 26 % (ref 40–53)
HGB BLD-MCNC: 8.8 G/DL (ref 13.3–17.7)
MCH RBC QN AUTO: 30.8 PG (ref 26.5–33)
MCHC RBC AUTO-ENTMCNC: 33.8 G/DL (ref 31.5–36.5)
MCV RBC AUTO: 91 FL (ref 78–100)
PLATELET # BLD AUTO: 228 10E9/L (ref 150–450)
POTASSIUM SERPL-SCNC: 4 MMOL/L (ref 3.4–5.3)
RBC # BLD AUTO: 2.86 10E12/L (ref 4.4–5.9)
SODIUM SERPL-SCNC: 129 MMOL/L (ref 133–144)
WBC # BLD AUTO: 7.7 10E9/L (ref 4–11)

## 2017-06-07 PROCEDURE — 85027 COMPLETE CBC AUTOMATED: CPT | Performed by: EMERGENCY MEDICINE

## 2017-06-07 PROCEDURE — 74020 XR ABDOMEN 2 VW: CPT

## 2017-06-07 PROCEDURE — 99284 EMERGENCY DEPT VISIT MOD MDM: CPT

## 2017-06-07 PROCEDURE — 80048 BASIC METABOLIC PNL TOTAL CA: CPT | Performed by: EMERGENCY MEDICINE

## 2017-06-07 ASSESSMENT — ENCOUNTER SYMPTOMS
FEVER: 0
DIFFICULTY URINATING: 1
ABDOMINAL DISTENTION: 1
SHORTNESS OF BREATH: 0
CONSTIPATION: 1
ABDOMINAL PAIN: 0
ABDOMINAL PAIN: 1

## 2017-06-07 NOTE — DISCHARGE INSTRUCTIONS
Please make an appointment to follow up with Urology Clinic (phone: [phone]) in 3-5 days     Urinary Retention (Male)  Urinary retention is the medical term for difficulty or inability to pass urine, even though your bladder is full.  Causes  The most common cause of urinary retention in men is the bladder outlet being blocked. This can be due to an enlarged prostate gland or a bladder infection. Certain medicines can also cause this problem. This condition is more likely to occur as men get older.  -  This condition is treated by insertion of a catheter into the bladder to drain the urine. This provides immediate relief. The catheter may need to remain in place for a few days to prevent a recurrence. The catheter has a balloon on the tip which was inflated after insertion. This prevents the catheter from falling out.  Symptoms  Common symptoms of urinary retention include:    Pain (not experienced by everyone)    Frequent urination    Feeling that the bladder is still full after urinating    Incontinence (not being able to control the release of urine)    Swollen abdomen  Treatment  This condition is treated by inserting a tube (catheter) into the bladder to drain the urine. This provides immediate relief. The catheter may need to stay in place for a few days. The catheter has a balloon on the tip, which is inflated after insertion. This prevents the catheter from falling out.  Home care    If you were given antibiotics, take them until they are used up, or your healthcare provider tells you to stop. It is important to finish the antibiotics even though you feel better. This is to make sure your infection has cleared.    If a catheter was left in place, it is important to keep bacteria from getting into the collection bag. Do not disconnect the catheter from the collection bag.    Use a leg band to secure the drainage tube, so it does not pull on the catheter. Drain the collection bag when it becomes full  using the drain spout at the bottom of the bag.    Do not pull on or try to remove your catheter. This will injure your urethra. The catheter must be removed by a healthcare provider.  Follow-up care  Follow up with your healthcare provider, or as advised.  If a catheter was left in place, it can usually be removed within 3 to 7 days. Some conditions require the catheter to stay in longer. Your healthcare provider will tell you when to return to have the catheter removed.  When to seek medical advice  Call your healthcare provider right away if any of these occur:    Fever of 100.4 F (38 C) or higher, or as directed by your healthcare provider    Bladder or lower-abdominal pain or fullness    Abdominal swelling, nausea, vomiting, or back pain    Blood or urine leakage around the catheter    Bloody urine coming from the catheter (if a new symptom)    Weakness, dizziness, or fainting    Confusion or change in usual level of alertness    If a catheter was left in place, return if:    Catheter falls out    Catheter stops draining for 6 hours    9665-4513 [employer]. [address] [place] [address], [place] [address]. All rights reserved. This information is not intended as a substitute for professional medical care. Always follow your healthcare professional's instructions.

## 2017-06-07 NOTE — ED PROVIDER NOTES
History     Chief Complaint   Patient presents with     Urinary Retention     HPI  Cesar Villalobos is a 51 year old male with a history of hypertension and CKD stage IV who presents with urinary retention. The patient underwent placement of a peritoneal dialysis catheter placed earlier today for further dialysis. Following this procedure, the patient reports that he has had difficulty urinating with urinary retention. He has not yet undergone dialysis. He is still making urine. He denies any other complaints or concerns currently.     Past Medical History:   Diagnosis Date     Anemia in chronic kidney disease      CKD (chronic kidney disease), stage IV (H)      Dyslipidemia      Hypertension        Past Surgical History:   Procedure Laterality Date     HERNIA REPAIR, INGUINAL RT/LT Left     as child       Family History   Problem Relation Age of Onset     CANCER Brother      KIDNEY DISEASE Brother      due to XRT     HEART DISEASE Sister        Social History   Substance Use Topics     Smoking status: Never Smoker     Smokeless tobacco: Not on file     Alcohol use 0.0 oz/week     0 Standard drinks or equivalent per week      Comment: 2 beers monthly     No current facility-administered medications for this encounter.      Current Outpatient Prescriptions   Medication     oxyCODONE-acetaminophen (PERCOCET) 5-325 MG per tablet     MELATONIN PO     darbepoetin debra (ARANESP, ALBUMIN FREE,) 60 MCG/0.3ML injection     sodium bicarbonate 650 MG tablet     calcitRIOL (ROCALTROL) 0.5 MCG capsule     DILT- MG 24 hr ER capsule     calcium carbonate (TUMS) 500 MG chewable tablet     atorvastatin (LIPITOR) 10 MG tablet     ferrous sulfate (IRON) 325 (65 FE) MG tablet     Cholecalciferol (VITAMIN D) 2000 UNITS CAPS     fish oil-omega-3 fatty acids (FISH OIL) 1000 MG capsule        Allergies   Allergen Reactions     Nsaids      Swelling and Hives          I have reviewed the Medications, Allergies, Past Medical and  "Surgical History, and Social History in the Epic system.    Review of Systems   Constitutional: Negative for fever.   Respiratory: Negative for shortness of breath.    Cardiovascular: Negative for chest pain.   Gastrointestinal: Negative for abdominal pain.   Genitourinary: Positive for difficulty urinating (urinary retention).   All other systems reviewed and are negative.      Physical Exam   BP: 123/73  Pulse: 88  Temp: 98.3  F (36.8  C)  Resp: 18  Height: 188 cm (6' 2\")  Weight: 97.5 kg (215 lb)  SpO2: 97 %  Physical Exam  Vital Signs and Nursing Notes Reviewed.  General:  NAD  HEENT: Oropharynx clear.  No obvious signs of trauma.  PERRL.  EOMI  Neck: Supple.  No lymphadenopathy.  Cardiac: RRR.  No murmurs, rubs or gallops  Lungs: Clear bilaterally.  Normal respiratory rate.    Abdomen:  Soft, Nontender, no rebound or guarding.  Back: No CVA tenderness.  Skin:  No rash.  No diaphoresis  Extremities:  No cyanosis, clubbing, or edema.  Pulse:  Symmetric in bilateral upper and lower extremities.    ED Course     ED Course     Procedures     12:14 AM  The patient was seen and examined by Dr. Gomez in Room 10.               Critical Care time:  none               Labs Ordered and Resulted from Time of ED Arrival Up to the Time of Departure from the ED - No data to display         Assessments & Plan (with Medical Decision Making)   51 year old urinary retention after peritoneal dialysis catheter placed today.  I had over thousand cc in the bladder.  Steven catheter is placed.  1100 cc out.  Patient feeling significantly better.  No hypotension.  Discussed Steven care.  Will follow-up with urology next 2-3 days.  Will return for any worsening symptoms.  No signs of infection currently.    I have reviewed the nursing notes.    I have reviewed the findings, diagnosis, plan and need for follow up with the patient.    Discharge Medication List as of 6/7/2017 12:25 AM          Final diagnoses:   Urinary retention     I, " Edita Hoyos, am serving as a trained medical scribe to document services personally performed by Layton Gomez MD, based on the provider's statements to me.   I, Layton Gomez MD, was physically present and have reviewed and verified the accuracy of this note documented by Edita Hoyos.     6/6/2017   [facility], [place], EMERGENCY DEPARTMENT     Layton Gomez MD  06/07/17 0155

## 2017-06-07 NOTE — ED NOTES
Patient presents to triage c/o urinary retention. Patient had surgery this morning to place a peritoneal dialysis catheter, having no pain or complications with the procedure, but unable to void substantial amounts since before the procedure. Denies any pain.

## 2017-06-07 NOTE — TELEPHONE ENCOUNTER
Reviewed most recent note from anemia services. They recommend that patient come in for labs and injection if needed on 6/8. Reviewed recommendations with patient; he would prefer to get labs and injection on Friday 6/9 since he will already be here for an appointment.     Sent message to schedulers to add on appointments. Will update anemia management team.    Amaury Vazquez RN

## 2017-06-07 NOTE — TELEPHONE ENCOUNTER
"Pt asking through call center for \"anemia shot\" from Dr. Perez. Declined their offer to transfer him to the anemia clinic. Please advise. Sent to neph [place].    "

## 2017-06-07 NOTE — TELEPHONE ENCOUNTER
Appt changed to 6/9 for hgb/aranesp per Amaury Vazquez RN.    Deb Zamora, PharmD, [facility]  [phone]  June 7, 2017

## 2017-06-07 NOTE — ED AVS SNAPSHOT
Emergency Department    [address] [facility] [address]    Phone:  [phone]    Fax:  [phone]                                       Cesar Villalobos   MRN: [id number]    Department:   Emergency Department   Date of Visit:  6/7/2017           Patient Information     Date Of Birth          1965        Your diagnoses for this visit were:     Constipation, unspecified constipation type     Hyponatremia        You were seen by Khang Miller MD.      Follow-up Information     Follow up with Sallie Olsen MD. Schedule an appointment as soon as possible for a visit in 2 days.    Specialty:  Family Practice    Contact information:    [place] [address]  [place] [id number] [phone]          Follow up with  Emergency Department.    Specialty:  EMERGENCY MEDICINE    Why:  If symptoms worsen    Contact information:    [address] [facility] [id number] [phone]        Discharge Instructions       Discharge Instructions  Constipation  Your doctor has diagnosed you with constipation. Constipation can cause severe cramping pain and your physician thinks this is the cause of your abdominal pain today.  People usually recognize that they are constipated because they have difficulty having bowel movements, are not having bowel movements frequently enough, or are not having large enough bowel movements. Sometimes, especially in children or older people, you do not recognize that you are constipated until it becomes severe. The most common cause of constipation is a combination of lack of exercise and not eating enough fruits, vegetables and whole grains. Constipation can also be a side effect of medications, such as narcotics, or may be caused by a disease of the digestive system.    Return to the Emergency Department if:    Your abdominal pain worsens or does not improve after a bowel movement.    You become very weak.    You get  an oral temperature above 102oF or as directed by your doctor.    You have blood in your stools (bright red or black, tarry stools).    You keep throwing up or can t drink liquids.    Your see blood when you throw up.    Your stomach gets bloated or bigger.    You have new symptoms or anything that worries you.    What can I do to help myself?    If your doctor gave you a cathartic medication, like magnesium citrate or GoLytely  (polyethylene glycol), you can expect to have cramps and gas pains after taking it. You can expect to have a number of bowel movements and even diarrhea in the course of clearing your bowels.  You will know your bowels have been cleaned out after you pass clear liquid. The cramps and gas should let up after you have emptied your bowels. You may want to wait until morning to take this type of medication so you aren t up in the night.     Sometimes instead of cathartics, we recommend laxatives like milk of magnesia to move your bowels more slowly, or an enema to help the bowels to move. Read and follow the package directions, or follow your physician s instructions.    Once you have become very constipated, it takes time for your bowels to return to normal and you need to be very careful to prevent becoming constipated again. Take a laxative if you don t move your bowels at least every two days.       Eat foods that have a lot of fiber. Good choices are fruits, vegetables, prune juice, apple juice and high fiber cereal. Limit dairy products such as milk and cheese, since these can make constipation worse.     Drink plenty of water and other fluids.     When you feel the need to go to the bathroom, go to the bathroom. Don t hold it.    Miralax , Metamucil , Colace , Senna or fiber supplements can be used daily.  Miralax  daily is often the best choice for children.    FOLLOW UP WITH YOUR REGULAR DOCTOR IF YOUR CONSTIPATION IS NOT IMPROVING.  Sometimes, chronic constipation requires further  testing to determine the cause. If you are over 50 years old, you may need a colonoscopy if you have not had one before.   If you were given a prescription for medicine here today, be sure to read all of the information (including the package insert) that comes with your prescription.  This will include important information about the medicine, its side effects, and any warnings that you need to know about.  The pharmacist who fills the prescription can provide more information and answer questions you may have about the medicine.  If you have questions or concerns that the pharmacist cannot address, please call or return to the Emergency Department.   Opioid Medication Information    Pain medications are among the most commonly prescribed medicines, so we are including this information for all our patients. If you did not receive pain medication or get a prescription for pain medicine, you can ignore it.     You may have been given a prescription for an opioid (narcotic) pain medicine and/or have received a pain medicine while here in the Emergency Department. These medicines can make you drowsy or impaired. You must not drive, operate dangerous equipment, or engage in any other dangerous activities while taking these medications. If you drive while taking these medications, you could be arrested for DUI, or driving under the influence. Do not drink any alcohol while you are taking these medications.     Opioid pain medications can cause addiction. If you have a history of chemical dependency of any type, you are at a higher risk of becoming addicted to pain medications.  Only take these prescribed medications to treat your pain when all other options have been tried. Take it for as short a time and as few doses as possible. Store your pain pills in a secure place, as they are frequently stolen and provide a dangerous opportunity for children or visitors in your house to start abusing these powerful medications. We  will not replace any lost or stolen medicine.  As soon as your pain is better, you should flush all your remaining medication.     Many prescription pain medications contain Tylenol  (acetaminophen), including Vicodin , Tylenol #3 , Norco , Lortab , and Percocet .  You should not take any extra pills of Tylenol  if you are using these prescription medications or you can get very sick.  Do not ever take more than 3000 mg of acetaminophen in any 24 hour period.    All opioids tend to cause constipation. Drink plenty of water and eat foods that have a lot of fiber, such as fruits, vegetables, prune juice, apple juice and high fiber cereal.  Take a laxative if you don t move your bowels at least every other day. Miralax , Milk of Magnesia, Colace , or Senna  can be used to keep you regular.      Remember that you can always come back to the Emergency Department if you are not able to see your regular doctor in the amount of time listed above, if you get any new symptoms, or if there is anything that worries you.        Future Appointments        Provider Department Dept Phone Center    6/9/2017 8:00 AM Sunday Chin MD [facility] Prostate and Urologic Cancers [phone] [facility]    6/9/2017 9:15 AM Lab [facility] Lab [phone] [facility]    6/9/2017 9:45 AM Transplant Nurse [facility] Solid Organ Transplant [phone] [facility]    6/21/2017 8:00 AM [facility] Lab [phone] [facility]    6/21/2017 9:05 AM Asya Perez MD [facility] Nephrology [phone] [facility]    6/21/2017 1:15 PM RUPA Patrick [facility] Solid Organ Transplant [phone] [facility]      24 Hour Appointment Hotline       To make an appointment at any [facility], call [phone] ([phone]). If you don't have a family doctor or clinic, we will help you find one. [facility] are conveniently located to serve the needs of you and your family.             Review of your medicines      START taking         Dose / Directions Last dose taken    polyethylene glycol powder   Commonly known as:  MIRALAX   Dose:  1 capful   Quantity:  527 g        Take 17 g (1 capful) by mouth daily   Refills:  0          Our records show that you are taking the medicines listed below. If these are incorrect, please call your family doctor or clinic.        Dose / Directions Last dose taken    atorvastatin 10 MG tablet   Commonly known as:  LIPITOR   Dose:  10 mg   Quantity:  90 tablet        Take 1 tablet (10 mg) by mouth daily   Refills:  3        calcitRIOL 0.5 MCG capsule   Commonly known as:  ROCALTROL   Dose:  1 mcg   Quantity:  180 capsule        Take 2 capsules (1 mcg) by mouth daily   Refills:  3        calcium carbonate 500 MG chewable tablet   Commonly known as:  TUMS   Dose:  1 chew tab        Take 1 chew tab by mouth 3 times daily Dose is 750mg   Refills:  0        darbepoetin debra 60 MCG/0.3ML injection   Commonly known as:  ARANESP (ALBUMIN FREE)   Dose:  60 mcg   Quantity:  0.3 mL        Inject 0.3 mLs (60 mcg) Subcutaneous every 14 days As needed for hgb<10g/dL.  If Hgb increases >1 point in 2 weeks (if blood transfusion given, use hgb PRIOR to this), SYSTOLIC BP > 180 mmHg or hgb>=10g/dL, HOLD DOSE. Dose must be within 1 week of Hgb.  Per anemia protocol with Asya Perez MD/Deb Zamora,PharmD [phone]   Refills:  99        DILT- MG 24 hr capsule   Quantity:  90 capsule   Generic drug:  diltiazem        TAKE 1 CAPSULE BY MOUTH EVERY DAY   Refills:  3        ferrous sulfate 325 (65 FE) MG tablet   Commonly known as:  IRON   Dose:  1 tablet   Quantity:  100 tablet        Take 1 tablet (325 mg) by mouth daily (with breakfast)   Refills:  3        fish oil-omega-3 fatty acids 1000 MG capsule   Dose:  1 capsule        Take 1 capsule by mouth daily.   Refills:  0        MELATONIN PO   Dose:  2 mg        Take 2 mg by mouth At Bedtime   Refills:  0        oxyCODONE-acetaminophen 5-325 MG per tablet   Commonly known  as:  PERCOCET   Dose:  1 tablet   Quantity:  10 tablet        Take 1 tablet by mouth every 8 hours as needed for pain maximum 3 tablet(s) per day   Refills:  0        sodium bicarbonate 650 MG tablet   Dose:  1300 mg   Quantity:  120 tablet        Take 2 tablets (1,300 mg) by mouth 3 times daily   Refills:  11        vitamin D 2000 UNITS Caps   Dose:  4000 Units        Take 4,000 Units by mouth daily   Refills:  0                Prescriptions were sent or printed at these locations (1 Prescription)                   Other Prescriptions                Printed at Department/Unit printer (1 of 1)         polyethylene glycol (MIRALAX) powder                Procedures and tests performed during your visit     Abdomen XR, 2 vw, flat and upright    Basic metabolic panel    CBC (+ platelets, no diff)    Soap suds enema      Orders Needing Specimen Collection     None      Pending Results     No orders found for last 3 day(s).            Pending Culture Results     No orders found for last 3 day(s).            Pending Results Instructions     If you had any lab results that were not finalized at the time of your Discharge, you can call the ED Lab Result RN at [phone]. You will be contacted by this team for any positive Lab results or changes in treatment. The nurses are available 7 days a week from 10A to 6:30P.  You can leave a message 24 hours per day and they will return your call.        Test Results From Your Hospital Stay        6/7/2017 11:44 PM      Component Results     Component Value Ref Range & Units Status    Sodium 129 (L) 133 - 144 mmol/L Final    Potassium 4.0 3.4 - 5.3 mmol/L Final    Chloride 92 (L) 94 - 109 mmol/L Final    Carbon Dioxide 25 20 - 32 mmol/L Final    Anion Gap 12 3 - 14 mmol/L Final    Glucose 95 70 - 99 mg/dL Final    Urea Nitrogen 85 (H) 7 - 30 mg/dL Final    Creatinine 9.07 (H) 0.66 - 1.25 mg/dL Final    GFR Estimate 6 (L) >60 mL/min/1.7m2 Final    Non  GFR Calc    GFR  Estimate If Black 7 (L) >60 mL/min/1.7m2 Final    African American GFR Calc    Calcium 11.4 (H) 8.5 - 10.1 mg/dL Final         6/7/2017 11:33 PM      Component Results     Component Value Ref Range & Units Status    WBC 7.7 4.0 - 11.0 10e9/L Final    RBC Count 2.86 (L) 4.4 - 5.9 10e12/L Final    Hemoglobin 8.8 (L) 13.3 - 17.7 g/dL Final    Hematocrit 26.0 (L) 40.0 - 53.0 % Final    MCV 91 78 - 100 fl Final    MCH 30.8 26.5 - 33.0 pg Final    MCHC 33.8 31.5 - 36.5 g/dL Final    RDW 13.1 10.0 - 15.0 % Final    Platelet Count 228 150 - 450 10e9/L Final         6/8/2017 12:01 AM      Narrative     ABDOMEN 2 VIEWS  6/7/2017 11:13 PM     HISTORY: History of recent dialysis shunt placement. Abdominal pain  and constipation.    COMPARISON: None.    FINDINGS: Gas is present within nondilated small and large bowel. A  moderate amount of stool is present within the colon. No visualized  bowel wall thickening or pneumatosis. A small amount of free  intraperitoneal gas is present under the right hemidiaphragm. A  peritoneal dialysis catheter is present projecting over the inferior  pelvis.        Impression     IMPRESSION:   1. A small amount of free intraperitoneal gas is present under the  right hemidiaphragm. This is within normal limits if there has been a  recent intra-abdominal procedure or surgery, such as placement of the  intraperitoneal dialysis catheter described in the clinical history.  If there has not been a recent intra-abdominal procedure or surgery,  then bowel perforation is possible. Recommend clinical correlation.  2. A moderate amount of stool within the colon.  3. No evidence of bowel obstruction.    [CRITICAL RESULT: Unexplained pneumoperitoneum.]    Finding was identified on 6/7/2017 11:36 PM.     Dr. Miller was contacted by me on 6/7/2017 11:38 PM and verbalized  understanding of the critical result.    YUN CURRY MD                Clinical Quality Measure: Blood Pressure Screening     Your blood  pressure was checked while you were in the emergency department today. The last reading we obtained was  BP: 149/88 . Please read the guidelines below about what these numbers mean and what you should do about them.  If your systolic blood pressure (the top number) is less than 120 and your diastolic blood pressure (the bottom number) is less than 80, then your blood pressure is normal. There is nothing more that you need to do about it.  If your systolic blood pressure (the top number) is 120-139 or your diastolic blood pressure (the bottom number) is 80-89, your blood pressure may be higher than it should be. You should have your blood pressure rechecked within a year by a primary care provider.  If your systolic blood pressure (the top number) is 140 or greater or your diastolic blood pressure (the bottom number) is 90 or greater, you may have high blood pressure. High blood pressure is treatable, but if left untreated over time it can put you at risk for heart attack, stroke, or kidney failure. You should have your blood pressure rechecked by a primary care provider within the next 4 weeks.  If your provider in the emergency department today gave you specific instructions to follow-up with your doctor or provider even sooner than that, you should follow that instruction and not wait for up to 4 weeks for your follow-up visit.        Thank you for choosing [facility]       Thank you for choosing [facility] for your care. Our goal is always to provide you with excellent care. Hearing back from our patients is one way we can continue to improve our services. Please take a few minutes to complete the written survey that you may receive in the mail after you visit with us. Thank you!        [employer] Information     [employer] gives you secure access to your electronic health record. If you see a primary care provider, you can also send messages to your care team and make appointments. If you have questions, please call your  primary care clinic.  If you do not have a primary care provider, please call [phone] and they will assist you.        Care EveryWhere ID     This is your Care EveryWhere ID. This could be used by other organizations to access your [place] medical records  [id number]        After Visit Summary       This is your record. Keep this with you and show to your community pharmacist(s) and doctor(s) at your next visit.

## 2017-06-07 NOTE — ED AVS SNAPSHOT
Emergency Department    [address] [place] [address]    Phone:  [phone]    Fax:  [phone]                                       Cesar Villalobos   MRN: [id number]    Department:   Emergency Department   Date of Visit:  6/7/2017           After Visit Summary Signature Page     I have received my discharge instructions, and my questions have been answered. I have discussed any challenges I see with this plan with the nurse or doctor.    ..........................................................................................................................................  Patient/Patient Representative Signature      ..........................................................................................................................................  Patient Representative Print Name and Relationship to Patient    ..................................................               ................................................  Date                                            Time    ..........................................................................................................................................  Reviewed by Signature/Title    ...................................................              ..............................................  Date                                                            Time

## 2017-06-07 NOTE — PROGRESS NOTES
Called and spoke to nurse Kuhshi at [facility]. She confirmed that they have received all the necessary paperwork for this patient and they will be contacting patient today to finalize his training schedule and schedule a wound check next week one week post op.    Amaury Vazquez RN

## 2017-06-08 ENCOUNTER — PRE VISIT (OUTPATIENT)
Dept: UROLOGY | Facility: CLINIC | Age: 52
End: 2017-06-08

## 2017-06-08 RX ORDER — POLYETHYLENE GLYCOL 3350 17 G/17G
1 POWDER, FOR SOLUTION ORAL DAILY
Qty: 527 G | Refills: 0 | Status: SHIPPED | OUTPATIENT
Start: 2017-06-08 | End: 2017-07-08

## 2017-06-08 ASSESSMENT — ENCOUNTER SYMPTOMS
ALTERED TEMPERATURE REGULATION: 1
INCREASED ENERGY: 1
POLYPHAGIA: 0
NIGHT SWEATS: 0
POLYDIPSIA: 0
BLOOD IN STOOL: 0
HALLUCINATIONS: 0
HEARTBURN: 0
RECTAL BLEEDING: 0
HEMATURIA: 0
DYSURIA: 0
BLOATING: 1
NAUSEA: 0
RECTAL PAIN: 0
FLANK PAIN: 0
BOWEL INCONTINENCE: 0
DECREASED APPETITE: 0
FATIGUE: 1
WEIGHT LOSS: 0
DIFFICULTY URINATING: 1
VOMITING: 0
CONSTIPATION: 1
CHILLS: 0
DIARRHEA: 0
SWOLLEN GLANDS: 0
FEVER: 0
JAUNDICE: 0
ABDOMINAL PAIN: 1
WEIGHT GAIN: 0
BRUISES/BLEEDS EASILY: 1

## 2017-06-08 NOTE — ED PROVIDER NOTES
"  History     Chief Complaint:  Constipation       HPI   Cesar Villalobos is a 51 year old male with a past medical history of chronic kidney disease who presents with wife for evaluation of constipation. The patient had a peritoneal shunt placed for dialysis yesterday [facility]. He had a Steven catheter placed and voided 1100 cc of urine. The patient was discharged with the Steven catheter in place and presents to the ED today with a two day history of constipation. He states he noticed his bowel movements were becoming harder prior to constipation and is only passing a small amount of gas. He states he has been having intermittent abdominal pain and has been distended. He denies taking the percocet he was prescribed after his procedure. The patient reports he had constipation in the past after a colonoscopy, and notes he is fairly sensitive with anesthesia.     Allergies:  Nsaids     Medications:    Fish oil  Vitamin D  Iron   Lipitor  Tums  Rocaltrol  Sodium bicarbonate  Aranesp  Melatonin  Percocet    Past Medical History:    Hyperlipidemia  Chronic kidney disease  Acidosis  Hypertension    Past Surgical History:    Hernia repair  Laparoscopic insertion catheter    Family History:    Sister: Cancer, kidney disease  Sister: Heart disease    Social History:  Marital Status:     Smoking status: Never smoker  Alcohol use: 2 beers/monthly      Review of Systems   Gastrointestinal: Positive for abdominal distention, abdominal pain and constipation.   All other systems reviewed and are negative.      Physical Exam   First Vitals:  BP: 149/88  Heart Rate: 71  Temp: 98.4  F (36.9  C)  Resp: 16  Height: 188 cm (6' 2\")  Weight: 97.5 kg (215 lb)  SpO2: 97 %    Physical Exam  General:                        Well-nourished                        Speaking in full sentences  Eyes:                        Conjunctiva without injection or scleral icterus           "              PERRL  ENT:                        Moist mucous membranes                        Posterior oropharynx clear without erythema or exudate                        Nares patent                        Pinnae normal  Neck:                        Full ROM                        No stiffness appreciated  Resp:                        Lungs CTAB                        No crackles, wheezing or audible rubs                        Good air movement  CV:                                        Normal rate, regular rhythm                        S1 and S2 present                        No murmur, gallop or rub  GI:                        BS present                        Abdomen soft without distention                        Diffuse mild tenderness             Laparoscopic incision sites are C/D/I, dressing over port site is clean                        No guarding or rebound tenderness  Skin:                        Warm, dry, well perfused                        No rashes or open wounds on exposed skin  MSK:                        Moves all extremities                        No focal deformities or swelling  Neuro:                        Alert                        Answers questions appropriately                        Moves all extremities equally                        Gait stable  Psych:                        Normal affect, normal mood    Emergency Department Course     Imaging:  Radiographic findings were communicated with the patient who voiced understanding of the findings.    Final Result  Abdomen XR, 2 vw, flat and upright  IMPRESSION:   1. A small amount of free intraperitoneal gas is present under the  right hemidiaphragm. This is within normal limits if there has been a  recent intra-abdominal procedure or surgery, such as placement of the  intraperitoneal dialysis catheter described in the clinical history.  If there has not been a recent intra-abdominal procedure or surgery,  then bowel perforation is  possible. Recommend clinical correlation.  2. A moderate amount of stool within the colon.  3. No evidence of bowel obstruction.    [CRITICAL RESULT: Unexplained pneumoperitoneum.]    Finding was identified on 6/7/2017 11:36 PM.     Dr. Miller was contacted by me on 6/7/2017 11:38 PM and verbalized  understanding of the critical result.    YUN CURRY MD    Laboratory:  CBC: WBC 7.7 (WNL) HGB 8.8 (L)  (WNL)   BMP: Cr 9.07 (H) Glucose 95 (WNL)  (L) Chloride 92 (L) BUN 85 (H) GFR 6 (L) Calcium 11.4 (H) Rest WNL      ED Course:  Nursing notes and past medical history reviewed.   I performed a physical examination of the patient as documented above.  I explained the plan with the patient who consents to this.   The above workups were undertaken.    2340: Radiology called to discuss the patient's imaging results.  0113: The patient was reevaluated and had a bowel movement. He reports that he is feeling better and wants to go home.  I personally reviewed the laboratory and imaging results with the Patient and answered all related questions prior to discharge.   Findings and plan explained to the Patient. Patient discharged home with instructions regarding supportive care, medications, and reasons to return. The importance of close follow-up was reviewed.     Impression & Plan      Medical Decision Making:  Cesar Villalobos is a very pleasant 51 year old male POD#1 from laparoscopic peritoneal dialysis catheter placement presenting to the ED for evaluation of constipation. Vitals signs on presentation reveal elevated blood pressure although otherwise are unremarkable. History and evaluation are consistent with constipation. The patient notes similar episodes following anesthesia in the past following colonoscopy. Last BM was two days prior and he noted preceding hard small stools. Abdominal XR demonstrates moderate stool throughout the colon.  No evidence of obstruction is noted. Note is made of a small  amount of free intraperitoneal gas. This is consistent with recent intraabdominal surgery. Abdominal exam is soft without peritoneal findings or rigid abdomen. I don t feel this is consistent with perforated viscus and I feel further advanced imaging can be deferred safely. Laboratory studies reveal sodium of 129 and chloride of 92 though normal potassium. CBC reveals stable anemia. The patient was provided soap anemia (avoided pink lady enema given renal failure). The patient was able to have a bowel movement and noted improvement in symptoms. Repeat abdominal exam revealed soft abdomen. Incision site appears to be healing well without evidence of dehiscence or discharge. The patient felt stable for discharge home. Symptomatic care is discussed including foods to ensure hydration, fiber supplementations, miralax and continuation of stool softener. He is welcome to return to the ER with severe abdominal pain, vomiting, inability to tolerate PO, abdominal distention, worsening constipation or any other concerns. Otherwise he will follow up with primary care provider as needed. All questions answered prior to discharge.     Diagnosis:    ICD-10-CM    1. Constipation, unspecified constipation type K59.00    2. Hyponatremia E87.1          Disposition:   Discharge to home with primary care follow up.     Discharge Medications:     Discharge Medication List as of 6/8/2017  1:22 AM      START taking these medications    Details   polyethylene glycol (MIRALAX) powder Take 17 g (1 capful) by mouth daily, Disp-527 g, R-0, Local Print               Topher MCCLOUD am serving as a scribe on 6/7/2017 at 10:32 PM to personally document services performed by Khang Miller MD, based on my observations and the provider's statements to me.         Khang Miller MD  06/08/17 0414

## 2017-06-08 NOTE — DISCHARGE INSTRUCTIONS
Discharge Instructions  Constipation  Your doctor has diagnosed you with constipation. Constipation can cause severe cramping pain and your physician thinks this is the cause of your abdominal pain today.  People usually recognize that they are constipated because they have difficulty having bowel movements, are not having bowel movements frequently enough, or are not having large enough bowel movements. Sometimes, especially in children or older people, you do not recognize that you are constipated until it becomes severe. The most common cause of constipation is a combination of lack of exercise and not eating enough fruits, vegetables and whole grains. Constipation can also be a side effect of medications, such as narcotics, or may be caused by a disease of the digestive system.    Return to the Emergency Department if:    Your abdominal pain worsens or does not improve after a bowel movement.    You become very weak.    You get an oral temperature above 102oF or as directed by your doctor.    You have blood in your stools (bright red or black, tarry stools).    You keep throwing up or can t drink liquids.    Your see blood when you throw up.    Your stomach gets bloated or bigger.    You have new symptoms or anything that worries you.    What can I do to help myself?    If your doctor gave you a cathartic medication, like magnesium citrate or GoLytely  (polyethylene glycol), you can expect to have cramps and gas pains after taking it. You can expect to have a number of bowel movements and even diarrhea in the course of clearing your bowels.  You will know your bowels have been cleaned out after you pass clear liquid. The cramps and gas should let up after you have emptied your bowels. You may want to wait until morning to take this type of medication so you aren t up in the night.     Sometimes instead of cathartics, we recommend laxatives like milk of magnesia to move your bowels more slowly, or an enema to  help the bowels to move. Read and follow the package directions, or follow your physician s instructions.    Once you have become very constipated, it takes time for your bowels to return to normal and you need to be very careful to prevent becoming constipated again. Take a laxative if you don t move your bowels at least every two days.       Eat foods that have a lot of fiber. Good choices are fruits, vegetables, prune juice, apple juice and high fiber cereal. Limit dairy products such as milk and cheese, since these can make constipation worse.     Drink plenty of water and other fluids.     When you feel the need to go to the bathroom, go to the bathroom. Don t hold it.    Miralax , Metamucil , Colace , Senna or fiber supplements can be used daily.  Miralax  daily is often the best choice for children.    FOLLOW UP WITH YOUR REGULAR DOCTOR IF YOUR CONSTIPATION IS NOT IMPROVING.  Sometimes, chronic constipation requires further testing to determine the cause. If you are over 50 years old, you may need a colonoscopy if you have not had one before.   If you were given a prescription for medicine here today, be sure to read all of the information (including the package insert) that comes with your prescription.  This will include important information about the medicine, its side effects, and any warnings that you need to know about.  The pharmacist who fills the prescription can provide more information and answer questions you may have about the medicine.  If you have questions or concerns that the pharmacist cannot address, please call or return to the Emergency Department.   Opioid Medication Information    Pain medications are among the most commonly prescribed medicines, so we are including this information for all our patients. If you did not receive pain medication or get a prescription for pain medicine, you can ignore it.     You may have been given a prescription for an opioid (narcotic) pain medicine  and/or have received a pain medicine while here in the Emergency Department. These medicines can make you drowsy or impaired. You must not drive, operate dangerous equipment, or engage in any other dangerous activities while taking these medications. If you drive while taking these medications, you could be arrested for DUI, or driving under the influence. Do not drink any alcohol while you are taking these medications.     Opioid pain medications can cause addiction. If you have a history of chemical dependency of any type, you are at a higher risk of becoming addicted to pain medications.  Only take these prescribed medications to treat your pain when all other options have been tried. Take it for as short a time and as few doses as possible. Store your pain pills in a secure place, as they are frequently stolen and provide a dangerous opportunity for children or visitors in your house to start abusing these powerful medications. We will not replace any lost or stolen medicine.  As soon as your pain is better, you should flush all your remaining medication.     Many prescription pain medications contain Tylenol  (acetaminophen), including Vicodin , Tylenol #3 , Norco , Lortab , and Percocet .  You should not take any extra pills of Tylenol  if you are using these prescription medications or you can get very sick.  Do not ever take more than 3000 mg of acetaminophen in any 24 hour period.    All opioids tend to cause constipation. Drink plenty of water and eat foods that have a lot of fiber, such as fruits, vegetables, prune juice, apple juice and high fiber cereal.  Take a laxative if you don t move your bowels at least every other day. Miralax , Milk of Magnesia, Colace , or Senna  can be used to keep you regular.      Remember that you can always come back to the Emergency Department if you are not able to see your regular doctor in the amount of time listed above, if you get any new symptoms, or if there is  anything that worries you.

## 2017-06-09 ENCOUNTER — ALLIED HEALTH/NURSE VISIT (OUTPATIENT)
Dept: TRANSPLANT | Facility: CLINIC | Age: 52
End: 2017-06-09
Attending: DERMATOLOGY
Payer: COMMERCIAL

## 2017-06-09 ENCOUNTER — TELEPHONE (OUTPATIENT)
Dept: PHARMACY | Facility: CLINIC | Age: 52
End: 2017-06-09

## 2017-06-09 ENCOUNTER — OFFICE VISIT (OUTPATIENT)
Dept: UROLOGY | Facility: CLINIC | Age: 52
End: 2017-06-09

## 2017-06-09 VITALS
BODY MASS INDEX: 27.59 KG/M2 | HEART RATE: 80 BPM | HEIGHT: 74 IN | DIASTOLIC BLOOD PRESSURE: 69 MMHG | SYSTOLIC BLOOD PRESSURE: 117 MMHG | WEIGHT: 215 LBS

## 2017-06-09 DIAGNOSIS — D63.1 ANEMIA OF CHRONIC RENAL FAILURE, STAGE 5 (H): Primary | ICD-10-CM

## 2017-06-09 DIAGNOSIS — R33.9 URINARY RETENTION: Primary | ICD-10-CM

## 2017-06-09 DIAGNOSIS — N18.5 CKD (CHRONIC KIDNEY DISEASE) STAGE 5, GFR LESS THAN 15 ML/MIN (H): ICD-10-CM

## 2017-06-09 DIAGNOSIS — N18.5 ANEMIA OF CHRONIC RENAL FAILURE, STAGE 5 (H): Primary | ICD-10-CM

## 2017-06-09 PROCEDURE — 96372 THER/PROPH/DIAG INJ SC/IM: CPT

## 2017-06-09 PROCEDURE — 25000128 H RX IP 250 OP 636: Mod: ZF | Performed by: INTERNAL MEDICINE

## 2017-06-09 RX ADMIN — DARBEPOETIN ALFA 60 MCG: 60 INJECTION, SOLUTION INTRAVENOUS; SUBCUTANEOUS at 14:36

## 2017-06-09 ASSESSMENT — PAIN SCALES - GENERAL: PAINLEVEL: NO PAIN (0)

## 2017-06-09 NOTE — NURSING NOTE
Patient presents to the clinic today for a trial of void, catheter removal.  Patient is Stalin Mercado    Order by: Dr Chin    Approx 210 cc of  Normal Saline  instilled into the bladder via catheter.   16 Polish indwelling urethral Catheter then removed with out difficulty   10mL sterile water removed from balloon, balloon intact  Patient voided approx 200mL of jaden urine.   Post-void residual was: 15mL   Patient tolerated procedure well.          Education: Teaching done with patient verbally as to where to go or call  if unable to urinate post-catheter removal. Increase fluids.   Plan: Follow-up patient to follow up with clinic or ER if unable to urinate in 4 to 5 hours    Buddy Plata

## 2017-06-09 NOTE — TELEPHONE ENCOUNTER
Anemia Management Note  SUBJECTIVE/OBJECTIVE:  Referred by Dr. Asya Perez on 5/2/2017.  Primary Diagnosis: Anemia in Chronic Kidney Disease (N18.5, D63.1)     Secondary Diagnosis:  Chronic Kidney Disease, Stage 5 (N18.5)  Hgb goal range:  9-10  Epo/Darbo: Aranesp 60mcg every other week - in clinic  TP/Rx exp: 5/25/18  Iron regimen:  Ferrous Sulfate two times daily  Labs exp: 5/1/18    Anemia Latest Ref Rng & Units 4/28/2017 5/8/2017 5/22/2017 5/25/2017 6/2/2017 6/6/2017 6/7/2017   SHERRY Dose - - - - 60 mcg - - -   Hemoglobin 13.3 - 17.7 g/dL 8.8(L) 9.8(L) 9.1(L) 9.3(L) 8.7(L) 8.9(L) 8.8(L)   TSAT 15 - 46 % - - 25 - 20 - -   Ferritin 26 - 388 ng/mL - - 383 - - - -     BP Readings from Last 3 Encounters:   06/09/17 117/69   06/07/17 149/88   06/07/17 (!) 137/95     Wt Readings from Last 2 Encounters:   06/09/17 215 lb (97.5 kg)   06/07/17 215 lb (97.5 kg)     ASSESSMENT:  Hgb:not at goal - received dose in clinic - recommend continue current regimen  TSat: Due for labs Ferritin: Due for labs    PLAN:  RTC for hgb then aranesp if needed in 2 week(s). He may go to [facility] for labs then make appt at [facility] if injection is needed.  Check iron studies with next labs    Orders needed to be renewed (for next follow-up date) in EPIC: None    Iron labs due:  6/21    Plan discussed with: Cesar    Plan provided by:  Deb    NEXT FOLLOW-UP DATE:  6/21    Anemia Management Service  Deb Zamora,LilyD and Pippa Giron CPhT  Phone: [phone]  Fax: [phone]

## 2017-06-09 NOTE — MR AVS SNAPSHOT
After Visit Summary   6/9/2017    Cesar Villalobos    MRN: [id number]           Patient Information     Date Of Birth          1965        Visit Information        Provider Department      6/9/2017 8:00 AM Sunday Chin MD [facility] and [facility]        Care Instructions    Follow-up patient to follow up with clinic or ER if unable to urinate in 4 to 5 hours    It was a pleasure meeting with you today.  Thank you for allowing me and my team the privilege of caring for you today.  YOU are the reason we are here, and I truly hope we provided you with the excellent service you deserve.  Please let us know if there is anything else we can do for you so that we can be sure you are leaving completely satisfied with your care experience.                  Follow-ups after your visit        Your next 10 appointments already scheduled     Jun 09, 2017  9:15 AM CDT   Lab with  LAB    [facility] ([facility])    [address] [place] [address] [id number] [phone]            Jun 09, 2017  9:45 AM CDT   (Arrive by 9:30 AM)   INJECTION with  Txc Nurse   [facility] ([facility])    [address] [place] [address] [id number] [phone]            Jun 21, 2017  1:15 PM CDT   (Arrive by 1:00 PM)   Post-Op with RUPA Muñoz   [facility] ([facility])    [address] [place] [address] [id number] [phone]              Who to contact     Please call your clinic at [phone] to:    Ask questions about your health    Make or cancel appointments    Discuss your medicines    Learn about your test results    Speak to your doctor   If you have compliments or concerns about an experience at your clinic, or if you wish to file a complaint, please contact [facility]  "Patient Relations at [phone] or email us at [email or web address]         Additional Information About Your Visit        [employer] Information     [employer] gives you secure access to your electronic health record. If you see a primary care provider, you can also send messages to your care team and make appointments. If you have questions, please call your primary care clinic.  If you do not have a primary care provider, please call [phone] and they will assist you.      [employer] is an electronic gateway that provides easy, online access to your medical records. With [employer], you can request a clinic appointment, read your test results, renew a prescription or communicate with your care team.     To access your existing account, please contact your [facility] or call [phone] for assistance.        Care EveryWhere ID     This is your Care EveryWhere ID. This could be used by other organizations to access your [place] medical records  [id number]        Your Vitals Were     Pulse Height BMI (Body Mass Index)             80 1.88 m (6' 2\") 27.6 kg/m2          Blood Pressure from Last 3 Encounters:   06/09/17 117/69   06/07/17 149/88   06/07/17 (!) 137/95    Weight from Last 3 Encounters:   06/09/17 97.5 kg (215 lb)   06/07/17 97.5 kg (215 lb)   06/06/17 97.5 kg (215 lb)              Today, you had the following     No orders found for display       Primary Care Provider Office Phone # Fax #    Sallie Olsen [phone]       [facility] [address]  [place] [address]        Thank you!     Thank you for choosing [facility] AND [facility]  for your care. Our goal is always to provide you with excellent care. Hearing back from our patients is one way we can continue to improve our services. Please take a few minutes to complete the written survey that you may receive in the mail after your " visit with us. Thank you!             Your Updated Medication List - Protect others around you: Learn how to safely use, store and throw away your medicines at www.disposemymeds.org.          This list is accurate as of: 6/9/17  8:31 AM.  Always use your most recent med list.                   Brand Name Dispense Instructions for use    atorvastatin 10 MG tablet    LIPITOR    90 tablet    Take 1 tablet (10 mg) by mouth daily       calcitRIOL 0.5 MCG capsule    ROCALTROL    180 capsule    Take 2 capsules (1 mcg) by mouth daily       calcium carbonate 500 MG chewable tablet    TUMS     Take 1 chew tab by mouth 3 times daily Dose is 750mg       darbepoetin debra 60 MCG/0.3ML injection    ARANESP (ALBUMIN FREE)    0.3 mL    Inject 0.3 mLs (60 mcg) Subcutaneous every 14 days As needed for hgb<10g/dL.  If Hgb increases >1 point in 2 weeks (if blood transfusion given, use hgb PRIOR to this), SYSTOLIC BP > 180 mmHg or hgb>=10g/dL, HOLD DOSE. Dose must be within 1 week of Hgb.  Per anemia protocol with Asya Perez MD/Deb Zamora,PharmD [phone]       DILT- MG 24 hr capsule   Generic drug:  diltiazem     90 capsule    TAKE 1 CAPSULE BY MOUTH EVERY DAY       ferrous sulfate 325 (65 FE) MG tablet    IRON    100 tablet    Take 1 tablet (325 mg) by mouth daily (with breakfast)       fish oil-omega-3 fatty acids 1000 MG capsule      Take 1 capsule by mouth daily.       MELATONIN PO      Take 2 mg by mouth At Bedtime       oxyCODONE-acetaminophen 5-325 MG per tablet    PERCOCET    10 tablet    Take 1 tablet by mouth every 8 hours as needed for pain maximum 3 tablet(s) per day       polyethylene glycol powder    MIRALAX    527 g    Take 17 g (1 capful) by mouth daily       sodium bicarbonate 650 MG tablet     120 tablet    Take 2 tablets (1,300 mg) by mouth 3 times daily       vitamin D 2000 UNITS Caps      Take 4,000 Units by mouth daily

## 2017-06-09 NOTE — PATIENT INSTRUCTIONS
Follow-up patient to follow up with clinic or ER if unable to urinate in 4 to 5 hours    It was a pleasure meeting with you today.  Thank you for allowing me and my team the privilege of caring for you today.  YOU are the reason we are here, and I truly hope we provided you with the excellent service you deserve.  Please let us know if there is anything else we can do for you so that we can be sure you are leaving completely satisfied with your care experience.

## 2017-06-09 NOTE — NURSING NOTE
Frequency: Every 14 days  Most recent or today's HGB: 8.8   Date: 17  Date of lat dose: 2017  HGB associated with last dose given: 9.3    Blood Pressure:132/79    Diagnosis: anemia, CKD-4    Ordered by: Asya Perez MD  VIS Offered: Taken    Double Checked by: Annmarie SHANNON CMA    See MAR for administration details    Pt's first name, last name and  verified prior to medication administration, injection given without complications or questions.     Nahomy Diaz MA

## 2017-06-09 NOTE — LETTER
6/9/2017     RE: Cesar Villalobos  [address] [place] [address]     Dear Colleague,    Thank you for referring your patient, Cesar Villalobos, to the [facility] UROLOGY AND INST FOR PROSTATE AND UROLOGIC CANCERS at [facility]. Please see a copy of my visit note below.    I am seeing Cesar Villalobos in consultation for evaluation of acute urinary retention .    HPI:  Cesar Villalobos is a 51 year old male with acute urinary retention after laparoscopic placement of peritoneal dialysis catheter.  He had frequent small voids before retention but drinks a lot of water.  No LE edema.  Had to go back the night of surgery with retention.    Has no history of retention, has always voided fine.    CKD, on transplant list.    REVIEW OF SYSTEMS:  Urological Surgeon  Answers for HPI/ROS submitted by the patient on 6/8/2017   General Symptoms: Yes  Skin Symptoms: No  HENT Symptoms: No  EYE SYMPTOMS: No  HEART SYMPTOMS: No  LUNG SYMPTOMS: No  INTESTINAL SYMPTOMS: Yes  URINARY SYMPTOMS: Yes  REPRODUCTIVE SYMPTOMS: Yes  SKELETAL SYMPTOMS: No  BLOOD SYMPTOMS: Yes  NERVOUS SYSTEM SYMPTOMS: No  MENTAL HEALTH SYMPTOMS: No  Fever: No  Loss of appetite: No  Weight loss: No  Weight gain: No  Fatigue: Yes  Night sweats: No  Chills: No  Increased stress: Yes  Excessive hunger: No  Excessive thirst: No  Feeling hot or cold when others believe the temperature is normal: Yes  Loss of height: No  Post-operative complications: Yes  Surgical site pain: No  Hallucinations: No  Change in or Loss of Energy: Yes  Hyperactivity: No  Confusion: No  Heart burn or indigestion: No  Nausea: No  Vomiting: No  Abdominal pain: Yes  Bloating: Yes  Constipation: Yes  Diarrhea: No  Blood in stool: No  Black stools: No  Rectal or Anal pain: No  Fecal incontinence: No  Rectal bleeding: No  Yellowing of skin or eyes: No  Vomit with blood: No  Change in stools: No  Hemorrhoids: No  Trouble holding urine or incontinence:  No  Pain or burning: No  Trouble starting or stopping: Yes  Increased frequency of urination: No  Blood in urine: No  Decreased frequency of urination: Yes  Frequent nighttime urination: No  Flank pain: No  Difficulty emptying bladder: Yes  Anemia: Yes  Swollen glands: No  Easy bleeding or bruising: Yes  Edema or swelling: No  Scrotal pain or swelling: No  Erectile dysfunction: Yes  Penile discharge: No  Genital ulcers: No  Reduced libido: Yes    PAST MEDICAL HX:  Past Medical History:   Diagnosis Date     Anemia in chronic kidney disease      CKD (chronic kidney disease), stage IV (H)      Dyslipidemia      Hypertension        PAST SURG HX:  Past Surgical History:   Procedure Laterality Date     HERNIA REPAIR, INGUINAL RT/LT Left     as child     LAPAROSCOPIC INSERTION CATHETER PERITONEAL DIALYSIS N/A 6/6/2017    Procedure: LAPAROSCOPIC INSERTION CATHETER PERITONEAL DIALYSIS;  Laparoscopic Peritoneal Dialysis Catheter Placement. ;  Surgeon: Caden Tay MD;  Location: [facility] OR        FAMILY HX:  Family History   Problem Relation Age of Onset     CANCER Brother      KIDNEY DISEASE Brother      due to XRT     HEART DISEASE Sister        SOCIAL HX:  Social History   Substance Use Topics     Smoking status: Never Smoker     Smokeless tobacco: Not on file     Alcohol use 0.0 oz/week     0 Standard drinks or equivalent per week      Comment: 2 beers monthly       MEDICATIONS:  Current Outpatient Prescriptions   Medication Sig     polyethylene glycol (MIRALAX) powder Take 17 g (1 capful) by mouth daily     MELATONIN PO Take 2 mg by mouth At Bedtime     darbepoetin debra (ARANESP, ALBUMIN FREE,) 60 MCG/0.3ML injection Inject 0.3 mLs (60 mcg) Subcutaneous every 14 days As needed for hgb<10g/dL.  If Hgb increases >1 point in 2 weeks (if blood transfusion given, use hgb PRIOR to this), SYSTOLIC BP > 180 mmHg or hgb>=10g/dL, HOLD DOSE. Dose must be within 1 week of Hgb.  Per anemia protocol with Asya Perez MD/Deb  "Noah,PharmD [phone]     sodium bicarbonate 650 MG tablet Take 2 tablets (1,300 mg) by mouth 3 times daily     calcitRIOL (ROCALTROL) 0.5 MCG capsule Take 2 capsules (1 mcg) by mouth daily     DILT- MG 24 hr ER capsule TAKE 1 CAPSULE BY MOUTH EVERY DAY     calcium carbonate (TUMS) 500 MG chewable tablet Take 1 chew tab by mouth 3 times daily Dose is 750mg     atorvastatin (LIPITOR) 10 MG tablet Take 1 tablet (10 mg) by mouth daily     ferrous sulfate (IRON) 325 (65 FE) MG tablet Take 1 tablet (325 mg) by mouth daily (with breakfast)     Cholecalciferol (VITAMIN D) 2000 UNITS CAPS Take 4,000 Units by mouth daily      fish oil-omega-3 fatty acids (FISH OIL) 1000 MG capsule Take 1 capsule by mouth daily.     oxyCODONE-acetaminophen (PERCOCET) 5-325 MG per tablet Take 1 tablet by mouth every 8 hours as needed for pain maximum 3 tablet(s) per day (Patient not taking: Reported on 6/9/2017)     No current facility-administered medications for this visit.        ALLERGIES:  Nsaids      GENERAL PHYSICAL EXAM:     /69 (BP Location: Right arm, Cuff Size: Adult Large)  Pulse 80  Ht 1.88 m (6' 2\")  Wt 97.5 kg (215 lb)  BMI 27.6 kg/m2   Constitutional: No acute distress. Well nourished.   PSYCH: normal mood and affect.  NEURO: normal gait, no focal deficits.   EYES: anicteric, EOMI, PERR.  CARDIOPULMONARY: breathing non-labored, pulse regular rate/rhythm, no peripheral edema.  GI: Abdomen mildly distended.  PD cath in place, bandaged.  MUSCULOSKELETAL: normal limb proportions, no muscle wasting, no contractures.  SKIN: Normal virilized hair distribution, no lesions, warts or rashes over genitalia, abdomen extremities or face.  HEME/LYMPH: no ecchymosis, no lymphadenopathy in groin or neck, no lymphedema.     EXAM:  Prostate exam: estimate 20g, nontender, anodular.    Imaging/labs:  Lab Results   Component Value Date    CR 9.07 06/07/2017    CR 9.58 06/06/2017    CR 9.77 06/02/2017     Lab Results "   Component Value Date    PSA 1.05 09/18/2014       ASSESSMENT:     Acute urinary retention     Prostate cancer screening     PLAN:    Normal KATHIE today, anodular.    Passed Fill-Pull-Void with 15cc PVR.    Recommended follow-up for PSA on a future day (may have false elevation from granados), 6-8 weeks from now at least, to complete prostate cancer screening.    Single dose antibiotic for granados removal.    Sunday Chin MD

## 2017-06-09 NOTE — MR AVS SNAPSHOT
After Visit Summary   6/9/2017    Cesar Villalobos    MRN: [id number]           Patient Information     Date Of Birth          1965        Visit Information        Provider Department      6/9/2017 9:45 AM Nurse, [facility] Solid Organ Transplant        Today's Diagnoses     Anemia of chronic renal failure, stage 5 (H)    -  1    CKD (chronic kidney disease) stage 5, GFR less than 15 ml/min (H)           Follow-ups after your visit        Your next 10 appointments already scheduled     Jun 21, 2017  1:15 PM CDT   (Arrive by 1:00 PM)   Post-Op with RUPA Muñoz   [facility] Solid Organ Transplant ([facility])    [address]  [address] [facility] [address] [phone]              Who to contact     If you have questions or need follow up information about today's clinic visit or your schedule please contact [facility]GAN TRANSPLANT directly at [phone].  Normal or non-critical lab and imaging results will be communicated to you by [employer], letter or phone within 4 business days after the clinic has received the results. If you do not hear from us within 7 days, please contact the clinic through [employer] or phone. If you have a critical or abnormal lab result, we will notify you by phone as soon as possible.  Submit refill requests through [employer] or call your pharmacy and they will forward the refill request to us. Please allow 3 business days for your refill to be completed.          Additional Information About Your Visit        [employer] Information     [employer] gives you secure access to your electronic health record. If you see a primary care provider, you can also send messages to your care team and make appointments. If you have questions, please call your primary care clinic.  If you do not have a primary care provider, please call [phone] and they will assist you.        Care EveryWhere ID     This is your Care EveryWhere  ID. This could be used by other organizations to access your [facility] medical records  [id number]         Blood Pressure from Last 3 Encounters:   06/09/17 117/69   06/07/17 149/88   06/07/17 (!) 137/95    Weight from Last 3 Encounters:   06/09/17 97.5 kg (215 lb)   06/07/17 97.5 kg (215 lb)   06/06/17 97.5 kg (215 lb)              Today, you had the following     No orders found for display       Primary Care Provider Office Phone # Fax #    Sallie Olsen [phone]       [facility] [address]  [place] [address]        Thank you!     Thank you for choosing [facility] SOLID ORGAN TRANSPLANT  for your care. Our goal is always to provide you with excellent care. Hearing back from our patients is one way we can continue to improve our services. Please take a few minutes to complete the written survey that you may receive in the mail after your visit with us. Thank you!             Your Updated Medication List - Protect others around you: Learn how to safely use, store and throw away your medicines at www.disposemymeds.org.          This list is accurate as of: 6/9/17  2:39 PM.  Always use your most recent med list.                   Brand Name Dispense Instructions for use    atorvastatin 10 MG tablet    LIPITOR    90 tablet    Take 1 tablet (10 mg) by mouth daily       calcitRIOL 0.5 MCG capsule    ROCALTROL    180 capsule    Take 2 capsules (1 mcg) by mouth daily       calcium carbonate 500 MG chewable tablet    TUMS     Take 1 chew tab by mouth 3 times daily Dose is 750mg       darbepoetin debra 60 MCG/0.3ML injection    ARANESP (ALBUMIN FREE)    0.3 mL    Inject 0.3 mLs (60 mcg) Subcutaneous every 14 days As needed for hgb<10g/dL.  If Hgb increases >1 point in 2 weeks (if blood transfusion given, use hgb PRIOR to this), SYSTOLIC BP > 180 mmHg or hgb>=10g/dL, HOLD DOSE. Dose must be within 1 week of Hgb.  Per anemia protocol with Asya Perez MD/Lily DowningD  [phone]       DILT- MG 24 hr capsule   Generic drug:  diltiazem     90 capsule    TAKE 1 CAPSULE BY MOUTH EVERY DAY       ferrous sulfate 325 (65 FE) MG tablet    IRON    100 tablet    Take 1 tablet (325 mg) by mouth daily (with breakfast)       fish oil-omega-3 fatty acids 1000 MG capsule      Take 1 capsule by mouth daily.       MELATONIN PO      Take 2 mg by mouth At Bedtime       oxyCODONE-acetaminophen 5-325 MG per tablet    PERCOCET    10 tablet    Take 1 tablet by mouth every 8 hours as needed for pain maximum 3 tablet(s) per day       polyethylene glycol powder    MIRALAX    527 g    Take 17 g (1 capful) by mouth daily       sodium bicarbonate 650 MG tablet     120 tablet    Take 2 tablets (1,300 mg) by mouth 3 times daily       vitamin D 2000 UNITS Caps      Take 4,000 Units by mouth daily

## 2017-06-09 NOTE — PROGRESS NOTES
I am seeing Cesar Villalobos in consultation for evaluation of acute urinary retention .    HPI:  Cesar Villalobos is a 51 year old male with acute urinary retention after laparoscopic placement of peritoneal dialysis catheter.  He had frequent small voids before retention but drinks a lot of water.  No LE edema.  Had to go back the night of surgery with retention.    Has no history of retention, has always voided fine.    CKD, on transplant list.    REVIEW OF SYSTEMS:  Urological Surgeon  Answers for HPI/ROS submitted by the patient on 6/8/2017   General Symptoms: Yes  Skin Symptoms: No  HENT Symptoms: No  EYE SYMPTOMS: No  HEART SYMPTOMS: No  LUNG SYMPTOMS: No  INTESTINAL SYMPTOMS: Yes  URINARY SYMPTOMS: Yes  REPRODUCTIVE SYMPTOMS: Yes  SKELETAL SYMPTOMS: No  BLOOD SYMPTOMS: Yes  NERVOUS SYSTEM SYMPTOMS: No  MENTAL HEALTH SYMPTOMS: No  Fever: No  Loss of appetite: No  Weight loss: No  Weight gain: No  Fatigue: Yes  Night sweats: No  Chills: No  Increased stress: Yes  Excessive hunger: No  Excessive thirst: No  Feeling hot or cold when others believe the temperature is normal: Yes  Loss of height: No  Post-operative complications: Yes  Surgical site pain: No  Hallucinations: No  Change in or Loss of Energy: Yes  Hyperactivity: No  Confusion: No  Heart burn or indigestion: No  Nausea: No  Vomiting: No  Abdominal pain: Yes  Bloating: Yes  Constipation: Yes  Diarrhea: No  Blood in stool: No  Black stools: No  Rectal or Anal pain: No  Fecal incontinence: No  Rectal bleeding: No  Yellowing of skin or eyes: No  Vomit with blood: No  Change in stools: No  Hemorrhoids: No  Trouble holding urine or incontinence: No  Pain or burning: No  Trouble starting or stopping: Yes  Increased frequency of urination: No  Blood in urine: No  Decreased frequency of urination: Yes  Frequent nighttime urination: No  Flank pain: No  Difficulty emptying bladder: Yes  Anemia: Yes  Swollen glands: No  Easy bleeding or bruising: Yes  Edema or  swelling: No  Scrotal pain or swelling: No  Erectile dysfunction: Yes  Penile discharge: No  Genital ulcers: No  Reduced libido: Yes    PAST MEDICAL HX:  Past Medical History:   Diagnosis Date     Anemia in chronic kidney disease      CKD (chronic kidney disease), stage IV (H)      Dyslipidemia      Hypertension        PAST SURG HX:  Past Surgical History:   Procedure Laterality Date     HERNIA REPAIR, INGUINAL RT/LT Left     as child     LAPAROSCOPIC INSERTION CATHETER PERITONEAL DIALYSIS N/A 6/6/2017    Procedure: LAPAROSCOPIC INSERTION CATHETER PERITONEAL DIALYSIS;  Laparoscopic Peritoneal Dialysis Catheter Placement. ;  Surgeon: Caden Tay MD;  Location: [facility] OR        FAMILY HX:  Family History   Problem Relation Age of Onset     CANCER Brother      KIDNEY DISEASE Brother      due to XRT     HEART DISEASE Sister        SOCIAL HX:  Social History   Substance Use Topics     Smoking status: Never Smoker     Smokeless tobacco: Not on file     Alcohol use 0.0 oz/week     0 Standard drinks or equivalent per week      Comment: 2 beers monthly       MEDICATIONS:  Current Outpatient Prescriptions   Medication Sig     polyethylene glycol (MIRALAX) powder Take 17 g (1 capful) by mouth daily     MELATONIN PO Take 2 mg by mouth At Bedtime     darbepoetin debra (ARANESP, ALBUMIN FREE,) 60 MCG/0.3ML injection Inject 0.3 mLs (60 mcg) Subcutaneous every 14 days As needed for hgb<10g/dL.  If Hgb increases >1 point in 2 weeks (if blood transfusion given, use hgb PRIOR to this), SYSTOLIC BP > 180 mmHg or hgb>=10g/dL, HOLD DOSE. Dose must be within 1 week of Hgb.  Per anemia protocol with Asya Perez MD/Deb Zamora,PharmD [phone]     sodium bicarbonate 650 MG tablet Take 2 tablets (1,300 mg) by mouth 3 times daily     calcitRIOL (ROCALTROL) 0.5 MCG capsule Take 2 capsules (1 mcg) by mouth daily     DILT- MG 24 hr ER capsule TAKE 1 CAPSULE BY MOUTH EVERY DAY     calcium carbonate (TUMS) 500 MG chewable tablet  "Take 1 chew tab by mouth 3 times daily Dose is 750mg     atorvastatin (LIPITOR) 10 MG tablet Take 1 tablet (10 mg) by mouth daily     ferrous sulfate (IRON) 325 (65 FE) MG tablet Take 1 tablet (325 mg) by mouth daily (with breakfast)     Cholecalciferol (VITAMIN D) 2000 UNITS CAPS Take 4,000 Units by mouth daily      fish oil-omega-3 fatty acids (FISH OIL) 1000 MG capsule Take 1 capsule by mouth daily.     oxyCODONE-acetaminophen (PERCOCET) 5-325 MG per tablet Take 1 tablet by mouth every 8 hours as needed for pain maximum 3 tablet(s) per day (Patient not taking: Reported on 6/9/2017)     No current facility-administered medications for this visit.        ALLERGIES:  Nsaids      GENERAL PHYSICAL EXAM:     /69 (BP Location: Right arm, Cuff Size: Adult Large)  Pulse 80  Ht 1.88 m (6' 2\")  Wt 97.5 kg (215 lb)  BMI 27.6 kg/m2   Constitutional: No acute distress. Well nourished.   PSYCH: normal mood and affect.  NEURO: normal gait, no focal deficits.   EYES: anicteric, EOMI, PERR.  CARDIOPULMONARY: breathing non-labored, pulse regular rate/rhythm, no peripheral edema.  GI: Abdomen mildly distended.  PD cath in place, bandaged.  MUSCULOSKELETAL: normal limb proportions, no muscle wasting, no contractures.  SKIN: Normal virilized hair distribution, no lesions, warts or rashes over genitalia, abdomen extremities or face.  HEME/LYMPH: no ecchymosis, no lymphadenopathy in groin or neck, no lymphedema.     EXAM:  Prostate exam: estimate 20g, nontender, anodular.    Imaging/labs:  Lab Results   Component Value Date    CR 9.07 06/07/2017    CR 9.58 06/06/2017    CR 9.77 06/02/2017     Lab Results   Component Value Date    PSA 1.05 09/18/2014       ASSESSMENT:     Acute urinary retention     Prostate cancer screening     PLAN:    Normal KATHIE today, anodular.    Passed Fill-Pull-Void with 15cc PVR.    Recommended follow-up for PSA on a future day (may have false elevation from granados), 6-8 weeks from now at least, to " complete prostate cancer screening.    Single dose antibiotic for granados removal.    Sunday Chin MD

## 2017-06-15 ENCOUNTER — CARE COORDINATION (OUTPATIENT)
Dept: NEPHROLOGY | Facility: CLINIC | Age: 52
End: 2017-06-15

## 2017-06-21 ENCOUNTER — TELEPHONE (OUTPATIENT)
Dept: PHARMACY | Facility: CLINIC | Age: 52
End: 2017-06-21

## 2017-06-21 NOTE — TELEPHONE ENCOUNTER
Follow-up with anemia management service:    LM for Cesar reminding him that he is due for Hgb, ferrtin and iron labs and then possibly an aranesp dose ib 17    Anemia Latest Ref Rng & Units 2017   SHERRY Dose - - - 60 mcg - - - 60 mcg   Hemoglobin 13.3 - 17.7 g/dL 9.8(L) 9.1(L) 9.3(L) 8.7(L) 8.9(L) 8.8(L) -   TSAT 15 - 46 % - 25 - 20 - - -   Ferritin 26 - 388 ng/mL - 383 - - - - -       Orders needed to be renewed (for next follow-up date) in EPIC: None   Med order expires: 18    Lab orders : 18    Follow-up call date: 17    Carly    Anemia Management Service  Deb Zamora,Cassie and Pippa Giron CPhT  Phone: [phone]  Fax: [phone]

## 2017-06-22 ENCOUNTER — TELEPHONE (OUTPATIENT)
Dept: OTHER | Facility: CLINIC | Age: 52
End: 2017-06-22

## 2017-06-22 DIAGNOSIS — D63.1 ANEMIA IN STAGE 5 CHRONIC KIDNEY DISEASE (H): ICD-10-CM

## 2017-06-22 DIAGNOSIS — N18.5 CKD (CHRONIC KIDNEY DISEASE) STAGE 5, GFR LESS THAN 15 ML/MIN (H): Primary | ICD-10-CM

## 2017-06-22 DIAGNOSIS — N18.5 CKD (CHRONIC KIDNEY DISEASE) STAGE 5, GFR LESS THAN 15 ML/MIN (H): ICD-10-CM

## 2017-06-22 DIAGNOSIS — N18.5 ANEMIA IN STAGE 5 CHRONIC KIDNEY DISEASE (H): ICD-10-CM

## 2017-06-22 LAB
ALBUMIN SERPL-MCNC: 3.9 G/DL (ref 3.4–5)
ANION GAP SERPL CALCULATED.3IONS-SCNC: 12 MMOL/L (ref 3–14)
ANION GAP SERPL CALCULATED.3IONS-SCNC: 14 MMOL/L (ref 3–14)
BUN SERPL-MCNC: 100 MG/DL (ref 7–30)
BUN SERPL-MCNC: 101 MG/DL (ref 7–30)
CALCIUM SERPL-MCNC: 8.7 MG/DL (ref 8.5–10.1)
CALCIUM SERPL-MCNC: 8.8 MG/DL (ref 8.5–10.1)
CHLORIDE SERPL-SCNC: 105 MMOL/L (ref 94–109)
CHLORIDE SERPL-SCNC: 105 MMOL/L (ref 94–109)
CO2 SERPL-SCNC: 20 MMOL/L (ref 20–32)
CO2 SERPL-SCNC: 20 MMOL/L (ref 20–32)
CREAT SERPL-MCNC: 8.88 MG/DL (ref 0.66–1.25)
CREAT SERPL-MCNC: 8.94 MG/DL (ref 0.66–1.25)
ERYTHROCYTE [DISTWIDTH] IN BLOOD BY AUTOMATED COUNT: 13.1 % (ref 10–15)
FERRITIN SERPL-MCNC: 199 NG/ML (ref 26–388)
GFR SERPL CREATININE-BSD FRML MDRD: 6 ML/MIN/1.7M2
GFR SERPL CREATININE-BSD FRML MDRD: 6 ML/MIN/1.7M2
GLUCOSE SERPL-MCNC: 94 MG/DL (ref 70–99)
GLUCOSE SERPL-MCNC: 98 MG/DL (ref 70–99)
HCT VFR BLD AUTO: 31.7 % (ref 40–53)
HGB BLD-MCNC: 9.9 G/DL (ref 13.3–17.7)
IRON SATN MFR SERPL: 15 % (ref 15–46)
IRON SERPL-MCNC: 50 UG/DL (ref 35–180)
MCH RBC QN AUTO: 30.7 PG (ref 26.5–33)
MCHC RBC AUTO-ENTMCNC: 31.2 G/DL (ref 31.5–36.5)
MCV RBC AUTO: 98 FL (ref 78–100)
PHOSPHATE SERPL-MCNC: 5.7 MG/DL (ref 2.5–4.5)
PLATELET # BLD AUTO: 276 10E9/L (ref 150–450)
POTASSIUM SERPL-SCNC: 5.4 MMOL/L (ref 3.4–5.3)
POTASSIUM SERPL-SCNC: 5.5 MMOL/L (ref 3.4–5.3)
PROT UR-MCNC: 0.41 G/L
PROT/CREAT 24H UR: 0.77 G/G CR (ref 0–0.2)
PTH-INTACT SERPL-MCNC: 312 PG/ML (ref 12–72)
RBC # BLD AUTO: 3.23 10E12/L (ref 4.4–5.9)
SODIUM SERPL-SCNC: 137 MMOL/L (ref 133–144)
SODIUM SERPL-SCNC: 139 MMOL/L (ref 133–144)
TIBC SERPL-MCNC: 339 UG/DL (ref 240–430)
WBC # BLD AUTO: 6.9 10E9/L (ref 4–11)

## 2017-06-22 PROCEDURE — 83540 ASSAY OF IRON: CPT | Performed by: INTERNAL MEDICINE

## 2017-06-22 PROCEDURE — 36415 COLL VENOUS BLD VENIPUNCTURE: CPT | Performed by: INTERNAL MEDICINE

## 2017-06-22 PROCEDURE — 82306 VITAMIN D 25 HYDROXY: CPT | Performed by: INTERNAL MEDICINE

## 2017-06-22 PROCEDURE — 80048 BASIC METABOLIC PNL TOTAL CA: CPT | Performed by: INTERNAL MEDICINE

## 2017-06-22 PROCEDURE — 80069 RENAL FUNCTION PANEL: CPT | Performed by: INTERNAL MEDICINE

## 2017-06-22 PROCEDURE — 83550 IRON BINDING TEST: CPT | Performed by: INTERNAL MEDICINE

## 2017-06-22 PROCEDURE — 83970 ASSAY OF PARATHORMONE: CPT | Performed by: INTERNAL MEDICINE

## 2017-06-22 PROCEDURE — 84156 ASSAY OF PROTEIN URINE: CPT | Performed by: INTERNAL MEDICINE

## 2017-06-22 PROCEDURE — 82728 ASSAY OF FERRITIN: CPT | Performed by: INTERNAL MEDICINE

## 2017-06-22 PROCEDURE — 85027 COMPLETE CBC AUTOMATED: CPT | Performed by: INTERNAL MEDICINE

## 2017-06-22 NOTE — TELEPHONE ENCOUNTER
Talked to patient regarding potassium level and need for recheck today. He verbalized understanding. Says he ate a melon which may be why his potassium was higher; will keep a better eye on this now (has seen a nutritionist, so is familiar with foods to avoid).    Also reviewed that if K is still high, this may require kayexalate, admission, or sooner dialysis. Patient would obviously like to avoid admission if possible.    Amaury Vazquez RN

## 2017-06-22 NOTE — TELEPHONE ENCOUNTER
Potassium drawn at dialysis unit on 6/20/17 came back at 6.1.  Hemoglobin is 10.    I asked Amaury Vazquez RN care coordinator to reach out to him to get BMP recheck today.  Patient is not on dialysis.  May need kayexalate or admission vs earlier initiation of PD.    Merline Muñiz MD  [facility]  Department of Medicine  Division of Renal Disease and Hypertension  [phone]

## 2017-06-23 ENCOUNTER — TELEPHONE (OUTPATIENT)
Dept: PHARMACY | Facility: CLINIC | Age: 52
End: 2017-06-23

## 2017-06-23 LAB — DEPRECATED CALCIDIOL+CALCIFEROL SERPL-MC: 42 UG/L (ref 20–75)

## 2017-06-23 NOTE — TELEPHONE ENCOUNTER
Anemia Management Note  SUBJECTIVE/OBJECTIVE:  Referred by Dr. Asya Perez on 5/2/2017.  Primary Diagnosis: Anemia in Chronic Kidney Disease (N18.5, D63.1)     Secondary Diagnosis:  Chronic Kidney Disease, Stage 5 (N18.5)  Hgb goal range:  9-10  Epo/Darbo: Aranesp 60mcg every other week - in clinic  TP/Rx exp: 5/25/18  Iron regimen:  Ferrous Sulfate two times daily  Labs exp: 5/1/18    Anemia Latest Ref Rng & Units 5/22/2017 5/25/2017 6/2/2017 6/6/2017 6/7/2017 6/9/2017 6/22/2017   SHERRY Dose - - 60 mcg - - - 60 mcg -   Hemoglobin 13.3 - 17.7 g/dL 9.1(L) 9.3(L) 8.7(L) 8.9(L) 8.8(L) - 9.9(L)   TSAT 15 - 46 % 25 - 20 - - - 15   Ferritin 26 - 388 ng/mL 383 - - - - - 199     BP Readings from Last 3 Encounters:   06/09/17 117/69   06/07/17 149/88   06/07/17 (!) 137/95     Wt Readings from Last 2 Encounters:   06/09/17 215 lb (97.5 kg)   06/07/17 215 lb (97.5 kg)     Patient starts dialysis training on 6/26/17 for at home dialysis    ASSESSMENT:  Hgb: At goal but rapid rise in hgb (>1pt/2 weeks) - recommend hold dose  TSat: not at goal of >30% Ferritin: At goal (>100ng/mL). Recommend increase ferrous sulfate dose. -lah6/28/17    PLAN:  Referred to Deb Zamora to KEELEY from anemia services but I told Cesar that Deb may contact him regarding low iron labs  6/28: LM for Cesar to call back. Recommend increase ferrous sulfate by one additional tab per day if he is tolerating. Ask when he is planning to initiate home dialysis. We will continue to manage anemia until he initiates dialysis. -rocío    Orders needed to be renewed (for next follow-up date) in EPIC: None    Iron labs due:  7/31    Plan discussed with:  Cesar  Plan provided by:  Carly    NEXT FOLLOW-UP DATE:  7/5    Anemia Management Service  Deb Zamora PharmD and Pippa Giron CPhT  Phone: [phone]  Fax: [phone]

## 2017-06-30 NOTE — TELEPHONE ENCOUNTER
[place] anemia service.    Deb Zamora, PharmD, [facility]  [phone]  June 30, 2017

## 2017-08-17 DIAGNOSIS — E83.39 HYPERPHOSPHATEMIA: Primary | ICD-10-CM

## 2017-08-17 RX ORDER — SEVELAMER HYDROCHLORIDE 800 MG/1
800 TABLET, FILM COATED ORAL
Qty: 90 TABLET | Refills: 11 | Status: SHIPPED | OUTPATIENT
Start: 2017-08-17 | End: 2018-03-22

## 2017-08-18 ENCOUNTER — TELEPHONE (OUTPATIENT)
Dept: NEPHROLOGY | Facility: CLINIC | Age: 52
End: 2017-08-18

## 2017-08-18 NOTE — TELEPHONE ENCOUNTER
PA Initiation    Medication: Renagel 800 mg tab  Insurance Company: [employer] [place] - Phone [phone] Fax [phone]  Pharmacy Filling the Rx: [id number] - [place], [place] - [address] AT [facility] [address]  Filling Pharmacy Phone: [phone]  Filling Pharmacy Fax: [phone]  Start Date: 8/18/2017

## 2017-08-18 NOTE — TELEPHONE ENCOUNTER
Prior Authorization Retail Medication Request  Medication/Dose: Renagel 800 mg tab  Diagnosis and ICD code: Hyperphosphatemia (E83.39)  New/Renewal/Insurance Change [place]: New  Previously Tried and Failed Therapies:     Insurance ID (if provided): [id number]  Insurance Phone (if provided): [phone]    Any additional info from fax request:     If you received a fax notification from an outside Pharmacy:  Pharmacy Name:[facility]   Pharmacy #: [phone]  Pharmacy Fax: [phone]

## 2017-08-21 ENCOUNTER — TELEPHONE (OUTPATIENT)
Dept: TRANSPLANT | Facility: CLINIC | Age: 52
End: 2017-08-21

## 2017-08-21 DIAGNOSIS — Z76.82 ORGAN TRANSPLANT CANDIDATE: Primary | ICD-10-CM

## 2017-08-21 DIAGNOSIS — N18.6 END STAGE RENAL DISEASE (H): ICD-10-CM

## 2017-08-21 NOTE — TELEPHONE ENCOUNTER
Returned patient's call asking what dialysis center he is using so I can send ALA orders, and let him know that he only needs and echo, labs, and SW for RWL.

## 2017-08-21 NOTE — LETTER
September 13, 2017    Cesar Villalobos  [address] [place] [address]      Dear Mr. Villalobos,    Enclosed you will find a copy of your transplant waitlist appointment schedule and a map to our location.    If you are unable to come in for your appointments for any reason, please contact Sheri at [phone].      Sincerely,       [facility]    CC: BRADEN Luque, VERONICA                                         [facility]  [address] [place] [address]  [id number]    WAITLIST CLINIC APPOINTMENTS    Patient   Cesar Villalobos        MR#:    [id number]  :  Sheri    [phone]  Coordinator:  Marissa LOW    [phone]  LPN:    Floridalma FRASER    [phone]  Location:   Transplant Center  Dates:   September 21, 2017    This is your schedule, please follow dates and times.  You will receive reminder phone calls for other tests, but please follow this schedule only!  If you have any questions about dates and times, please call us on number listed above.  Thank you, Transplant Clinic.     Day/Date:   Thursday, September 21, 2017  Time Location Activity   9:00 a.m. [facility]   Cardiology/Heart Care Clinic  3rd floor; Clinic 3L Echocardiogram   10:00 a.m. [facility]  Imaging and Lab Testing  1st floor Blood tests: PSA    11:00 a.m. [facility]  Transplant Services  3rd floor; Clinic 3A; Consult Room Appointment with either Alla or Mel,  Transplant

## 2017-08-23 NOTE — TELEPHONE ENCOUNTER
PRIOR AUTHORIZATION DENIED    Medication: Renagel 800 mg-DENIED    Denial Date: 8/23/2017    Denial Rational: PATIENT MUST TRY/FAIL 1 FORMULARY ALTERNATIVE - CALCIUM ACETATE 667MG CAPS.        Appeal Information: PATIENT NEEDS TO CALL INSURANCE NUMBER ON THE BACK OF THEIR CARD TO START THE APPEAL PROCESS.

## 2017-09-13 ENCOUNTER — TELEPHONE (OUTPATIENT)
Dept: TRANSPLANT | Facility: CLINIC | Age: 52
End: 2017-09-13

## 2017-09-21 ENCOUNTER — ALLIED HEALTH/NURSE VISIT (OUTPATIENT)
Dept: TRANSPLANT | Facility: CLINIC | Age: 52
End: 2017-09-21
Attending: PHYSICIAN ASSISTANT
Payer: COMMERCIAL

## 2017-09-21 ENCOUNTER — RESULTS ONLY (OUTPATIENT)
Dept: OTHER | Facility: CLINIC | Age: 52
End: 2017-09-21

## 2017-09-21 ENCOUNTER — RADIANT APPOINTMENT (OUTPATIENT)
Dept: CARDIOLOGY | Facility: CLINIC | Age: 52
End: 2017-09-21
Attending: INTERNAL MEDICINE

## 2017-09-21 DIAGNOSIS — Z76.82 ORGAN TRANSPLANT CANDIDATE: Primary | ICD-10-CM

## 2017-09-21 DIAGNOSIS — Z76.82 ORGAN TRANSPLANT CANDIDATE: ICD-10-CM

## 2017-09-21 DIAGNOSIS — N18.5 CKD (CHRONIC KIDNEY DISEASE) STAGE 5, GFR LESS THAN 15 ML/MIN (H): ICD-10-CM

## 2017-09-21 DIAGNOSIS — N18.6 END STAGE RENAL DISEASE (H): ICD-10-CM

## 2017-09-21 LAB
ALBUMIN SERPL-MCNC: 3.5 G/DL (ref 3.4–5)
ANION GAP SERPL CALCULATED.3IONS-SCNC: 10 MMOL/L (ref 3–14)
BUN SERPL-MCNC: 81 MG/DL (ref 7–30)
CALCIUM SERPL-MCNC: 9.3 MG/DL (ref 8.5–10.1)
CHLORIDE SERPL-SCNC: 102 MMOL/L (ref 94–109)
CO2 SERPL-SCNC: 25 MMOL/L (ref 20–32)
CREAT SERPL-MCNC: 9.94 MG/DL (ref 0.66–1.25)
CREAT UR-MCNC: 48 MG/DL
DEPRECATED CALCIDIOL+CALCIFEROL SERPL-MC: 23 UG/L (ref 20–75)
ERYTHROCYTE [DISTWIDTH] IN BLOOD BY AUTOMATED COUNT: 13.5 % (ref 10–15)
GFR SERPL CREATININE-BSD FRML MDRD: 5 ML/MIN/1.7M2
GLUCOSE SERPL-MCNC: 94 MG/DL (ref 70–99)
HCT VFR BLD AUTO: 31.7 % (ref 40–53)
HGB BLD-MCNC: 10.3 G/DL (ref 13.3–17.7)
MCH RBC QN AUTO: 29.9 PG (ref 26.5–33)
MCHC RBC AUTO-ENTMCNC: 32.5 G/DL (ref 31.5–36.5)
MCV RBC AUTO: 92 FL (ref 78–100)
PHOSPHATE SERPL-MCNC: 7.1 MG/DL (ref 2.5–4.5)
PLATELET # BLD AUTO: 204 10E9/L (ref 150–450)
POTASSIUM SERPL-SCNC: 4.7 MMOL/L (ref 3.4–5.3)
PROT UR-MCNC: 0.44 G/L
PROT/CREAT 24H UR: 0.92 G/G CR (ref 0–0.2)
PSA SERPL-ACNC: 1.52 UG/L (ref 0–4)
PTH-INTACT SERPL-MCNC: 252 PG/ML (ref 12–72)
RBC # BLD AUTO: 3.44 10E12/L (ref 4.4–5.9)
SODIUM SERPL-SCNC: 138 MMOL/L (ref 133–144)
WBC # BLD AUTO: 5.9 10E9/L (ref 4–11)

## 2017-09-21 PROCEDURE — 80069 RENAL FUNCTION PANEL: CPT | Performed by: INTERNAL MEDICINE

## 2017-09-21 PROCEDURE — 86833 HLA CLASS II HIGH DEFIN QUAL: CPT | Performed by: SURGERY

## 2017-09-21 PROCEDURE — 36415 COLL VENOUS BLD VENIPUNCTURE: CPT | Performed by: INTERNAL MEDICINE

## 2017-09-21 PROCEDURE — 82306 VITAMIN D 25 HYDROXY: CPT | Performed by: INTERNAL MEDICINE

## 2017-09-21 PROCEDURE — 85027 COMPLETE CBC AUTOMATED: CPT | Performed by: INTERNAL MEDICINE

## 2017-09-21 PROCEDURE — G0103 PSA SCREENING: HCPCS | Performed by: INTERNAL MEDICINE

## 2017-09-21 PROCEDURE — 86832 HLA CLASS I HIGH DEFIN QUAL: CPT | Performed by: SURGERY

## 2017-09-21 PROCEDURE — 83970 ASSAY OF PARATHORMONE: CPT | Performed by: INTERNAL MEDICINE

## 2017-09-21 PROCEDURE — 84156 ASSAY OF PROTEIN URINE: CPT | Performed by: INTERNAL MEDICINE

## 2017-09-21 NOTE — PROGRESS NOTES
Patient Name: Cesar Villalobos  : 1965  Age: 52 year old  MRN: [id number]  Date of Initial Social Work Evaluation: 2014    Patient on kidney transplant wait list.  Saw today to update psychosocial assessment.      Presenting Information   Living Situation: Patient resides in a home alone in [place]. Patient's 16 year old daughter stays with him part time. Patient is currently  from his wife but stated he still considers her someone who is supportive.   If not local, plans for short term stay:  n/a  Previous Functional Status: Patient is independent with ADL's.  Cultural/Language/Spiritual Considerations: None identified at this time.     Support System  Primary Support Person Friends  Other support:  Soon-to-be ex-wife  Plan for support in immediate post-transplant period: Friends    Health Care Directive  Decision Maker: Self  Alternate Decision Maker: Currently soon to be ex-wife  Health Care Directive: Will bring in copy- patient reported he will need to update his health care directive then will provide a copy    Mental Health/Coping:   History of Mental Health: Patient has history of depression.   History of Chemical Health: Patient denied any tobacco or substance use. Patient reported he rarely drinks alcohol.   Current status: Patient reported he has some general anxiety, but does not take any medication and feels he is able to manage it on his own.   Coping: Patient appears to be coping well.   Services Needed/Recommended: None identified at this time.     Financial   Income: Patient works full time for [employer] [place].  Impact of transplant on income: None identified at this time.   Insurance and medication coverage: [employer] [place]. Discussed patient's copays will go towards his [employer] out of pocket max. Patient also has a HSA that he will use towards his medications.  Financial concerns: None identified at this time.   Resources needed: None identified at this time.     Assessment and  recommendations and plan: Patient started PD dialysis in July 2017. Patient reported that is going well.  Reviewed transplant education (Medicare, rehabilitation, donor issues, community/financial resources, and psych/family adjustment) as well as psychosocial risks of transplant. Provided patient with a copy of post-transplant informational sheet that includes information on potential costs of medications, Medicare ESRD, post-transplant lodging, etc. Patient seemed to process information well. Appeared well informed, motivated, and able to follow post transplant requirements. Behavior was appropriate during interview. Has adequate income and insurance coverage. Adequate social support. No major contraindications noted for transplant. At this time, patient appears to understand the risks and benefits of transplant.     Mel Radford, [facility]    Kidney/Pancreas/[facility] Islet Transplant Programs

## 2017-09-22 DIAGNOSIS — E87.20 ACIDOSIS: Primary | ICD-10-CM

## 2017-09-22 DIAGNOSIS — I15.1 SECONDARY HYPERTENSION DUE TO RENAL DISEASE: ICD-10-CM

## 2017-09-22 DIAGNOSIS — N18.6 ESRD (END STAGE RENAL DISEASE) ON DIALYSIS (H): ICD-10-CM

## 2017-09-22 DIAGNOSIS — I15.1 HYPERTENSION SECONDARY TO OTHER RENAL DISORDERS: ICD-10-CM

## 2017-09-22 DIAGNOSIS — Z99.2 ESRD (END STAGE RENAL DISEASE) ON DIALYSIS (H): ICD-10-CM

## 2017-09-22 DIAGNOSIS — E78.2 MIXED HYPERLIPIDEMIA: ICD-10-CM

## 2017-09-22 LAB — PRA SINGLE ANTIGEN IGG ANTIBODY: NORMAL

## 2017-09-22 RX ORDER — LISINOPRIL 20 MG/1
20 TABLET ORAL DAILY
Qty: 90 TABLET | Refills: 3 | Status: ON HOLD | OUTPATIENT
Start: 2017-09-22 | End: 2018-01-10

## 2017-09-22 RX ORDER — SODIUM BICARBONATE 650 MG/1
1300 TABLET ORAL 2 TIMES DAILY
Qty: 360 TABLET | Refills: 3 | Status: SHIPPED | OUTPATIENT
Start: 2017-09-22 | End: 2018-11-12

## 2017-09-22 RX ORDER — DILTIAZEM HYDROCHLORIDE 180 MG/1
180 CAPSULE, EXTENDED RELEASE ORAL DAILY
Qty: 90 CAPSULE | Refills: 3 | Status: ON HOLD | OUTPATIENT
Start: 2017-09-22 | End: 2018-01-10

## 2017-09-22 RX ORDER — FOLIC ACID/VIT B COMPLEX AND C 0.8 MG
1 TABLET ORAL DAILY
Qty: 90 TABLET | Refills: 3 | Status: SHIPPED | OUTPATIENT
Start: 2017-09-22 | End: 2018-11-12

## 2017-09-22 RX ORDER — ATORVASTATIN CALCIUM 10 MG/1
10 TABLET, FILM COATED ORAL DAILY
Qty: 90 TABLET | Refills: 3 | Status: SHIPPED | OUTPATIENT
Start: 2017-09-22

## 2017-09-22 NOTE — PROGRESS NOTES
Current Outpatient Prescriptions   Medication Sig Dispense Refill     diltiazem (DILT-XR) 180 MG 24 hr capsule Take 1 capsule (180 mg) by mouth daily 90 capsule 3     lisinopril (PRINIVIL/ZESTRIL) 20 MG tablet Take 1 tablet (20 mg) by mouth daily 90 tablet 3     sodium bicarbonate 650 MG tablet Take 2 tablets (1,300 mg) by mouth 2 times daily 360 tablet 3     atorvastatin (LIPITOR) 10 MG tablet Take 1 tablet (10 mg) by mouth daily 90 tablet 3     sevelamer (RENAGEL) 800 MG tablet Take 1 tablet (800 mg) by mouth 3 times daily (with meals) 90 tablet 11     [DISCONTINUED] lisinopril (PRINIVIL/ZESTRIL) 20 MG tablet Take 1 tablet (20 mg) by mouth daily 90 tablet 3     MELATONIN PO Take 2 mg by mouth At Bedtime       [DISCONTINUED] DILT- MG 24 hr ER capsule TAKE 1 CAPSULE BY MOUTH EVERY DAY 90 capsule 3     calcium carbonate (TUMS) 500 MG chewable tablet Take 1 chew tab by mouth 3 times daily Dose is 750mg       [DISCONTINUED] atorvastatin (LIPITOR) 10 MG tablet Take 1 tablet (10 mg) by mouth daily 90 tablet 3     fish oil-omega-3 fatty acids (FISH OIL) 1000 MG capsule Take 1 capsule by mouth daily.

## 2017-10-12 LAB
SA1 CELL: NORMAL
SA1 COMMENTS: NORMAL
SA1 HI RISK ABY: NORMAL
SA1 MOD RISK ABY: NORMAL
SA1 TEST METHOD: NORMAL
SA2 CELL: NORMAL
SA2 COMMENTS: NORMAL
SA2 HI RISK ABY UA: NORMAL
SA2 MOD RISK ABY: NORMAL
SA2 TEST METHOD: NORMAL

## 2017-10-14 ENCOUNTER — HOSPITAL ENCOUNTER (EMERGENCY)
Facility: CLINIC | Age: 52
Discharge: HOME | End: 2017-10-15
Attending: EMERGENCY MEDICINE | Admitting: EMERGENCY MEDICINE
Payer: COMMERCIAL

## 2017-10-14 DIAGNOSIS — K65.9 BACTERIAL PERITONITIS (H): ICD-10-CM

## 2017-10-14 LAB
ALBUMIN UR-MCNC: 10 MG/DL
ANION GAP SERPL CALCULATED.3IONS-SCNC: 11 MMOL/L (ref 3–14)
APPEARANCE FLD: NORMAL
APPEARANCE UR: CLEAR
BASOPHILS # BLD AUTO: 0 10E9/L (ref 0–0.2)
BASOPHILS NFR BLD AUTO: 0.1 %
BILIRUB UR QL STRIP: NEGATIVE
BUN SERPL-MCNC: 91 MG/DL (ref 7–30)
CALCIUM SERPL-MCNC: 9.7 MG/DL (ref 8.5–10.1)
CHLORIDE SERPL-SCNC: 103 MMOL/L (ref 94–109)
CO2 SERPL-SCNC: 26 MMOL/L (ref 20–32)
COLOR FLD: COLORLESS
COLOR UR AUTO: ABNORMAL
CREAT SERPL-MCNC: 10.2 MG/DL (ref 0.66–1.25)
DIFFERENTIAL METHOD BLD: ABNORMAL
EOSINOPHIL # BLD AUTO: 0.3 10E9/L (ref 0–0.7)
EOSINOPHIL NFR BLD AUTO: 3.4 %
EOSINOPHIL NFR FLD MANUAL: 1 %
ERYTHROCYTE [DISTWIDTH] IN BLOOD BY AUTOMATED COUNT: 14.9 % (ref 10–15)
GFR SERPL CREATININE-BSD FRML MDRD: 5 ML/MIN/1.7M2
GLUCOSE SERPL-MCNC: 101 MG/DL (ref 70–99)
GLUCOSE UR STRIP-MCNC: 30 MG/DL
GRAM STN SPEC: NORMAL
GRAM STN SPEC: NORMAL
HCT VFR BLD AUTO: 34 % (ref 40–53)
HGB BLD-MCNC: 11.1 G/DL (ref 13.3–17.7)
HGB UR QL STRIP: ABNORMAL
IMM GRANULOCYTES # BLD: 0 10E9/L (ref 0–0.4)
IMM GRANULOCYTES NFR BLD: 0.2 %
KETONES UR STRIP-MCNC: NEGATIVE MG/DL
LEUKOCYTE ESTERASE UR QL STRIP: NEGATIVE
LYMPHOCYTES # BLD AUTO: 1.8 10E9/L (ref 0.8–5.3)
LYMPHOCYTES NFR BLD AUTO: 21.9 %
LYMPHOCYTES NFR FLD MANUAL: 2 %
MCH RBC QN AUTO: 29.9 PG (ref 26.5–33)
MCHC RBC AUTO-ENTMCNC: 32.6 G/DL (ref 31.5–36.5)
MCV RBC AUTO: 92 FL (ref 78–100)
MONOCYTES # BLD AUTO: 0.4 10E9/L (ref 0–1.3)
MONOCYTES NFR BLD AUTO: 4.2 %
MONOS+MACROS NFR FLD MANUAL: 7 %
NEUTROPHILS # BLD AUTO: 5.8 10E9/L (ref 1.6–8.3)
NEUTROPHILS NFR BLD AUTO: 70.2 %
NEUTS BAND NFR FLD MANUAL: 90 %
NITRATE UR QL: NEGATIVE
NRBC # BLD AUTO: 0 10*3/UL
NRBC BLD AUTO-RTO: 0 /100
PH UR STRIP: 7 PH (ref 5–7)
PLATELET # BLD AUTO: 244 10E9/L (ref 150–450)
POTASSIUM SERPL-SCNC: 4.7 MMOL/L (ref 3.4–5.3)
RBC # BLD AUTO: 3.71 10E12/L (ref 4.4–5.9)
RBC # FLD: NORMAL /UL
RBC #/AREA URNS AUTO: 0 /HPF (ref 0–2)
SODIUM SERPL-SCNC: 140 MMOL/L (ref 133–144)
SOURCE: ABNORMAL
SP GR UR STRIP: 1.01 (ref 1–1.03)
SPECIMEN SOURCE FLD: NORMAL
SPECIMEN SOURCE: NORMAL
UROBILINOGEN UR STRIP-MCNC: NORMAL MG/DL (ref 0–2)
WBC # BLD AUTO: 8.3 10E9/L (ref 4–11)
WBC # FLD AUTO: 252 /UL
WBC #/AREA URNS AUTO: <1 /HPF (ref 0–2)

## 2017-10-14 PROCEDURE — 87205 SMEAR GRAM STAIN: CPT | Performed by: INTERNAL MEDICINE

## 2017-10-14 PROCEDURE — 96367 TX/PROPH/DG ADDL SEQ IV INF: CPT

## 2017-10-14 PROCEDURE — 25000128 H RX IP 250 OP 636: Performed by: EMERGENCY MEDICINE

## 2017-10-14 PROCEDURE — 87070 CULTURE OTHR SPECIMN AEROBIC: CPT | Performed by: INTERNAL MEDICINE

## 2017-10-14 PROCEDURE — 40000097 ZZH STATISTIC LEVEL 4 EST PATIENT

## 2017-10-14 PROCEDURE — 89051 BODY FLUID CELL COUNT: CPT | Performed by: INTERNAL MEDICINE

## 2017-10-14 PROCEDURE — 27210995 ZZH RX 272: Performed by: INTERNAL MEDICINE

## 2017-10-14 PROCEDURE — 80048 BASIC METABOLIC PNL TOTAL CA: CPT | Performed by: EMERGENCY MEDICINE

## 2017-10-14 PROCEDURE — 99285 EMERGENCY DEPT VISIT HI MDM: CPT | Mod: 25

## 2017-10-14 PROCEDURE — 96365 THER/PROPH/DIAG IV INF INIT: CPT

## 2017-10-14 PROCEDURE — 87086 URINE CULTURE/COLONY COUNT: CPT | Performed by: EMERGENCY MEDICINE

## 2017-10-14 PROCEDURE — 81001 URINALYSIS AUTO W/SCOPE: CPT | Performed by: EMERGENCY MEDICINE

## 2017-10-14 PROCEDURE — 85025 COMPLETE CBC W/AUTO DIFF WBC: CPT | Performed by: EMERGENCY MEDICINE

## 2017-10-14 PROCEDURE — 96366 THER/PROPH/DIAG IV INF ADDON: CPT

## 2017-10-14 PROCEDURE — 99284 EMERGENCY DEPT VISIT MOD MDM: CPT | Mod: Z6 | Performed by: EMERGENCY MEDICINE

## 2017-10-14 RX ORDER — CEFEPIME HYDROCHLORIDE 1 G/1
1 INJECTION, POWDER, FOR SOLUTION INTRAMUSCULAR; INTRAVENOUS ONCE
Status: COMPLETED | OUTPATIENT
Start: 2017-10-14 | End: 2017-10-14

## 2017-10-14 RX ADMIN — VANCOMYCIN HYDROCHLORIDE 2000 MG: 1 INJECTION, POWDER, LYOPHILIZED, FOR SOLUTION INTRAVENOUS at 22:31

## 2017-10-14 RX ADMIN — CEFEPIME HYDROCHLORIDE 1 G: 1 INJECTION, POWDER, FOR SOLUTION INTRAMUSCULAR; INTRAVENOUS at 22:02

## 2017-10-14 RX ADMIN — SODIUM CHLORIDE, SODIUM LACTATE, CALCIUM CHLORIDE, MAGNESIUM CHLORIDE AND DEXTROSE: 1.5; 538; 448; 18.3; 5.08 INJECTION, SOLUTION INTRAPERITONEAL at 17:40

## 2017-10-14 ASSESSMENT — ENCOUNTER SYMPTOMS: FEVER: 0

## 2017-10-14 NOTE — PROGRESS NOTES
PERITONEAL DIALYSIS MANUAL TREATMENT NOTE      Date:  10/14/2017  Time:  5:40 PM    Data:                  Pt in ED, room 3, abdominal pain with fill and drain last night during therapy.  Spoke with [facility] nurse on call last night; advised to present to ED for assessment and eval.    Assessment:   Pt states when he dialyzed with the [facility] unit, they had discussed checking placement of the catheter due to frequent pain and or long fill/drain times.  Recently treated as an out patient for peritonitis.  Currently dialyzes with [facility].    Pt states he is free of pain today.  Dressing intact, CDI, catheter secured to abdomen with paper tape.    Interventions:   Infused 2L of 1.5%, low calcium fluid for dwell.  Will collect culture and drain abdomen after 60 minute dwell time.      Plan:      Next tx per renal team.

## 2017-10-14 NOTE — ED PROVIDER NOTES
"  History     Chief Complaint   Patient presents with     Abdominal Pain     related to peritonial dialysis      HPI  Cesar Villalobos is a 52 year old male with a history of CKD stage 4, HTN, anemia, and dyslipidemia who presents to the Emergency Department for evaluation of abdominal pain related to peritoneal dialysis. Patient reports that he had discoloration of fluid from peritoneal dialysis when he completed his 4 cycles last night and is now concerned for infection. He also notes seeing \"fibrin\" in the fluid. He denies fevers. He had an infection about 1.5 months ago. He last used antibiotics during the first week of September. He notes that does make urine and does not typically retain fluid. He also states that his stool has been \"thicker\" than usual.     Past Medical History:   Diagnosis Date     Anemia in chronic kidney disease      CKD (chronic kidney disease), stage IV (H)      Dyslipidemia      Hypertension        Past Surgical History:   Procedure Laterality Date     HERNIA REPAIR, INGUINAL RT/LT Left     as child     LAPAROSCOPIC INSERTION CATHETER PERITONEAL DIALYSIS N/A 6/6/2017    Procedure: LAPAROSCOPIC INSERTION CATHETER PERITONEAL DIALYSIS;  Laparoscopic Peritoneal Dialysis Catheter Placement. ;  Surgeon: Caden Tay MD;  Location:  OR       Family History   Problem Relation Age of Onset     CANCER Brother      KIDNEY DISEASE Brother      due to XRT     HEART DISEASE Sister        Social History   Substance Use Topics     Smoking status: Never Smoker     Smokeless tobacco: Never Used     Alcohol use 0.0 oz/week     0 Standard drinks or equivalent per week      Comment: 2 beers monthly       No current facility-administered medications for this encounter.      Current Outpatient Prescriptions   Medication     diltiazem (DILT-XR) 180 MG 24 hr capsule     lisinopril (PRINIVIL/ZESTRIL) 20 MG tablet     sodium bicarbonate 650 MG tablet     atorvastatin (LIPITOR) 10 MG tablet     B " Complex-C-Folic Acid (DIALYVITE 800) 0.8 MG TABS     sevelamer (RENAGEL) 800 MG tablet     MELATONIN PO     calcium carbonate (TUMS) 500 MG chewable tablet     fish oil-omega-3 fatty acids (FISH OIL) 1000 MG capsule        Allergies   Allergen Reactions     Nsaids      Swelling and Hives         I have reviewed the Medications, Allergies, Past Medical and Surgical History, and Social History in the Epic system.    Review of Systems   Constitutional: Negative for fever.   Respiratory: Negative for shortness of breath.    Gastrointestinal: Positive for abdominal pain. Negative for blood in stool.   Genitourinary: Negative for decreased urine volume, dysuria and flank pain.   All other systems reviewed and are negative.      Physical Exam   BP: 117/67  Pulse: 85  Temp: 98  F (36.7  C)  Resp: 18  Weight: 98.7 kg (217 lb 9.5 oz)  SpO2: 95 %       Physical Exam   Constitutional: He is oriented to person, place, and time. Vital signs are normal. He appears well-developed and well-nourished.  Non-toxic appearance. He does not appear ill. No distress.   HENT:   Head: Normocephalic and atraumatic.   Mouth/Throat: Oropharynx is clear and moist. No oropharyngeal exudate.   Eyes: Conjunctivae and EOM are normal. Pupils are equal, round, and reactive to light. No scleral icterus.   Neck: Normal range of motion. Neck supple. No JVD present. No tracheal deviation present. No thyromegaly present.   Cardiovascular: Normal rate, regular rhythm, normal heart sounds and intact distal pulses.  Exam reveals no gallop and no friction rub.    No murmur heard.  Pulmonary/Chest: Effort normal and breath sounds normal. No respiratory distress.   Abdominal: Soft. Bowel sounds are normal. He exhibits no distension and no mass. There is no tenderness. There is no rebound and no guarding.   Peritoneal dialysis catheter site without erythema or discharge.   Musculoskeletal: Normal range of motion. He exhibits no edema or tenderness.    Lymphadenopathy:     He has no cervical adenopathy.   Neurological: He is alert and oriented to person, place, and time. He has normal strength. No cranial nerve deficit or sensory deficit.   Skin: Skin is warm and dry. No rash noted. No erythema. No pallor.   Psychiatric: He has a normal mood and affect. His behavior is normal.   Nursing note and vitals reviewed.      ED Course   2:52 PM  The patient was seen and examined by Jorge Alberto Mariano MD in Room 3.   ED Course     Procedures  Results for orders placed or performed during the hospital encounter of 10/14/17   CBC with platelets differential   Result Value Ref Range    WBC 8.3 4.0 - 11.0 10e9/L    RBC Count 3.71 (L) 4.4 - 5.9 10e12/L    Hemoglobin 11.1 (L) 13.3 - 17.7 g/dL    Hematocrit 34.0 (L) 40.0 - 53.0 %    MCV 92 78 - 100 fl    MCH 29.9 26.5 - 33.0 pg    MCHC 32.6 31.5 - 36.5 g/dL    RDW 14.9 10.0 - 15.0 %    Platelet Count 244 150 - 450 10e9/L    Diff Method Automated Method     % Neutrophils 70.2 %    % Lymphocytes 21.9 %    % Monocytes 4.2 %    % Eosinophils 3.4 %    % Basophils 0.1 %    % Immature Granulocytes 0.2 %    Nucleated RBCs 0 0 /100    Absolute Neutrophil 5.8 1.6 - 8.3 10e9/L    Absolute Lymphocytes 1.8 0.8 - 5.3 10e9/L    Absolute Monocytes 0.4 0.0 - 1.3 10e9/L    Absolute Eosinophils 0.3 0.0 - 0.7 10e9/L    Absolute Basophils 0.0 0.0 - 0.2 10e9/L    Abs Immature Granulocytes 0.0 0 - 0.4 10e9/L    Absolute Nucleated RBC 0.0    Basic metabolic panel   Result Value Ref Range    Sodium 140 133 - 144 mmol/L    Potassium 4.7 3.4 - 5.3 mmol/L    Chloride 103 94 - 109 mmol/L    Carbon Dioxide 26 20 - 32 mmol/L    Anion Gap 11 3 - 14 mmol/L    Glucose 101 (H) 70 - 99 mg/dL    Urea Nitrogen 91 (H) 7 - 30 mg/dL    Creatinine 10.20 (H) 0.66 - 1.25 mg/dL    GFR Estimate 5 (L) >60 mL/min/1.7m2    GFR Estimate If Black 6 (L) >60 mL/min/1.7m2    Calcium 9.7 8.5 - 10.1 mg/dL   Routine UA with microscopic   Result Value Ref Range    Color Urine Light Yellow      Appearance Urine Clear     Glucose Urine 30 (A) NEG^Negative mg/dL    Bilirubin Urine Negative NEG^Negative    Ketones Urine Negative NEG^Negative mg/dL    Specific Gravity Urine 1.006 1.003 - 1.035    Blood Urine Small (A) NEG^Negative    pH Urine 7.0 5.0 - 7.0 pH    Protein Albumin Urine 10 (A) NEG^Negative mg/dL    Urobilinogen mg/dL Normal 0.0 - 2.0 mg/dL    Nitrite Urine Negative NEG^Negative    Leukocyte Esterase Urine Negative NEG^Negative    Source Midstream Urine     WBC Urine <1 0 - 2 /HPF    RBC Urine 0 0 - 2 /HPF   Urine Culture Aerobic Bacterial   Result Value Ref Range    Specimen Description Midstream Urine     Special Requests Specimen received in preservative     Culture Micro No growth    Cell count with differential fluid   Result Value Ref Range    Body Fluid Analysis Source Peritoneal fluid     Color Fluid Colorless     Appearance Fluid Slightly Cloudy     RBC Fluid << Do Not Report >> /uL    WBC Fluid 252 /uL    % Neutrophils Fluid 90 %    % Lymphocytes Fluid 2 %    % Mono/Macro Fluid 7 %    % Eosinophils Fluid 1 %   Fluid Culture Aerobic Bacterial   Result Value Ref Range    Specimen Description Peritoneal fluid     Culture Micro No growth    Gram stain   Result Value Ref Range    Specimen Description Peritoneal fluid     Gram Stain No organisms seen     Gram Stain Many  WBC'S seen  predominantly PMN's               Assessments & Plan (with Medical Decision Making)   This patient had presented to the emergency department with some abdominal pain and some change in the color of his dialysate fluid for peritoneal dialysis.  He had recently been treated for bacterial peritonitis and is concerned he may be developing this again.  He is afebrile without leukocytosis and with a benign abdominal exam absent of peritoneal signs.  I did discuss the case with nephrology as well as the peritoneal dialysis nurse.  The plan at this point is for the peritoneal dialysis nurse to come in and to instill  some fluid through the catheter and let this dwell for a period of time before draining it to be sent to lab for cell count and differential as well as Culture and Gram stain.  Orders for this were placed by the nephrology staff and a cell count and differential and culture orders were placed by myself.  At this point we re waiting for this to occur and disposition will be based on the results of this and by evaluation by the nephrology service.      This part of the document was transcribed by Bradford Witt, Medical Scribe.       I have reviewed the nursing notes.    I have reviewed the findings, diagnosis, plan and need for follow up with the patient.    Discharge Medication List as of 10/15/2017  1:01 AM          Final diagnoses:   Bacterial peritonitis (H)   I, Jennifer Jon, am serving as a trained medical scribe to document services personally performed by Jorge Alberto Mariano MD, based on the provider's statements to me.      IJorge Alberto MD, was physically present and have reviewed and verified the accuracy of this note documented by Jennifer Jon.       10/14/2017   [facility], [place], EMERGENCY DEPARTMENT     Keyon Mariano MD  10/19/17 1024

## 2017-10-14 NOTE — ED NOTES
Abdominal pain and discoloration of fluid removed from peritoneal dialysis.   Had infection a couple months ago, ESRD patient  VSS

## 2017-10-14 NOTE — ED AVS SNAPSHOT
[facility], Emergency Department    [address] [address]    Phone:  [phone]                                       Cesar Villalobos   MRN: [id number]    Department:  [facility], Emergency Department   Date of Visit:  10/14/2017           Patient Information     Date Of Birth          1965        Your diagnoses for this visit were:     Bacterial peritonitis (H)        You were seen by Keyon Mariano MD and Baldemar Bryson MD.      Follow-up Information     Follow up with Sallie Olsen MD. Call in 1 week.    Specialty:  Family Practice    Why:  FOLLOW UP WITH YOUR DIALYSIS CENTER ON MONDAY     Contact information:    [address]  [place] [address]  [phone]          Discharge Instructions             24 Hour Appointment Hotline       To make an appointment at any [facility] clinic, call [phone] ([phone]). If you don't have a family doctor or clinic, we will help you find one. [facility] clinics are conveniently located to serve the needs of you and your family.             Review of your medicines      Our records show that you are taking the medicines listed below. If these are incorrect, please call your family doctor or clinic.        Dose / Directions Last dose taken    atorvastatin 10 MG tablet   Commonly known as:  LIPITOR   Dose:  10 mg   Quantity:  90 tablet        Take 1 tablet (10 mg) by mouth daily   Refills:  3        calcium carbonate 500 MG chewable tablet   Commonly known as:  TUMS   Dose:  1 chew tab        Take 1 chew tab by mouth 3 times daily Dose is 750mg   Refills:  0        DIALYVITE 800 0.8 MG Tabs   Dose:  1 tablet   Quantity:  90 tablet        Take 1 tablet by mouth daily   Refills:  3        diltiazem 180 MG 24 hr capsule   Commonly known as:  DILT-XR   Dose:  180 mg   Quantity:  90 capsule        Take 1 capsule (180 mg) by mouth daily   Refills:  3        fish oil-omega-3 fatty acids 1000 MG capsule   Dose:  1 capsule         Take 1 capsule by mouth daily.   Refills:  0        lisinopril 20 MG tablet   Commonly known as:  PRINIVIL/ZESTRIL   Dose:  20 mg   Quantity:  90 tablet        Take 1 tablet (20 mg) by mouth daily   Refills:  3        MELATONIN PO   Dose:  2 mg        Take 2 mg by mouth At Bedtime   Refills:  0        sevelamer 800 MG tablet   Commonly known as:  RENAGEL   Dose:  800 mg   Quantity:  90 tablet        Take 1 tablet (800 mg) by mouth 3 times daily (with meals)   Refills:  11        sodium bicarbonate 650 MG tablet   Dose:  1300 mg   Quantity:  360 tablet        Take 2 tablets (1,300 mg) by mouth 2 times daily   Refills:  3                Procedures and tests performed during your visit     Procedure/Test Number of Times Performed    Basic metabolic panel 1    CBC with platelets differential 1    Cell count with differential fluid 2    Fluid Culture Aerobic Bacterial 2    Gram stain 2    Routine UA with microscopic 1    Urine Culture Aerobic Bacterial 1      Orders Needing Specimen Collection     Ordered          10/14/17 1541  Wound Culture Aerobic Bacterial: Peritoneal Catheter Exit site - ROUTINE, Prio: Routine, Needs to be Collected     Scheduled Task Status   10/14/17 1540 Collect Wound Culture Aerobic Bacterial: Peritoneal Catheter Exit site Open   Order Class:  PCU Collect                10/14/17 1541  Hepatitis B Surface Antibody - ROUTINE, Prio: Routine, Needs to be Collected     Scheduled Task Status   10/14/17 1540 Collect Hepatitis B Surface Antibody Open   Order Class:  PCU Collect                10/14/17 1541  Hepatitis B surface antigen - ROUTINE, Prio: Routine, Needs to be Collected     Scheduled Task Status   10/14/17 1540 Collect Hepatitis B surface antigen Open   Order Class:  PCU Collect                  Pending Results     Date and Time Order Name Status Description    10/14/2017 1840 Fluid Culture Aerobic Bacterial In process     10/14/2017 1502 Urine Culture Aerobic Bacterial Preliminary              Pending Culture Results     Date and Time Order Name Status Description    10/14/2017 1840 Fluid Culture Aerobic Bacterial In process     10/14/2017 1502 Urine Culture Aerobic Bacterial Preliminary             Pending Results Instructions     If you had any lab results that were not finalized at the time of your Discharge, you can call the ED Lab Result RN at [phone]. You will be contacted by this team for any positive Lab results or changes in treatment. The nurses are available 7 days a week from 10A to 6:30P.  You can leave a message 24 hours per day and they will return your call.        Thank you for choosing [facility]       Thank you for choosing [facility] for your care. Our goal is always to provide you with excellent care. Hearing back from our patients is one way we can continue to improve our services. Please take a few minutes to complete the written survey that you may receive in the mail after you visit with us. Thank you!        [employer] Information     [employer] gives you secure access to your electronic health record. If you see a primary care provider, you can also send messages to your care team and make appointments. If you have questions, please call your primary care clinic.  If you do not have a primary care provider, please call [phone] and they will assist you.        Care EveryWhere ID     This is your Care EveryWhere ID. This could be used by other organizations to access your [facility] medical records  [id number]        Equal Access to Services     COLLEEN DEWEY : Brittany Flores, waaxda luqadaha, qaybta kaalmada fatuma, chandrakant carrasco. So [facility] [phone].    ATENCIÓN: Si habla español, tiene a coe disposición servicios gratuitos de asistencia lingüística. Llame al [phone].    We comply with applicable federal civil rights laws and [place] laws. We do not discriminate on the basis of race, color, national origin, age,  disability, sex, sexual orientation, or gender identity.            After Visit Summary       This is your record. Keep this with you and show to your community pharmacist(s) and doctor(s) at your next visit.

## 2017-10-14 NOTE — ED AVS SNAPSHOT
[facility], [place], Emergency Department    [address] [place] [address]    Phone:  [phone]                                       Cesar Villalobos   MRN: [id number]    Department:  [facility], Emergency Department   Date of Visit:  10/14/2017           After Visit Summary Signature Page     I have received my discharge instructions, and my questions have been answered. I have discussed any challenges I see with this plan with the nurse or doctor.    ..........................................................................................................................................  Patient/Patient Representative Signature      ..........................................................................................................................................  Patient Representative Print Name and Relationship to Patient    ..................................................               ................................................  Date                                            Time    ..........................................................................................................................................  Reviewed by Signature/Title    ...................................................              ..............................................  Date                                                            Time

## 2017-10-15 VITALS
OXYGEN SATURATION: 97 % | DIASTOLIC BLOOD PRESSURE: 69 MMHG | RESPIRATION RATE: 16 BRPM | WEIGHT: 217.59 LBS | SYSTOLIC BLOOD PRESSURE: 115 MMHG | BODY MASS INDEX: 27.94 KG/M2 | HEART RATE: 96 BPM | TEMPERATURE: 98 F

## 2017-10-15 LAB
BACTERIA SPEC CULT: NO GROWTH
Lab: NORMAL
SPECIMEN SOURCE: NORMAL

## 2017-10-15 NOTE — PROGRESS NOTES
Nephrology Initial Consult  October 14, 2017      Saw Cesar in ER.  Known to me as I follow him for PD.  Came in with cloudy PD fluid, abdominal pain last night with filling with dialysate.  Feels better today but came to ER due to cloudy fluid.  Instilled 2 liters fluid and let it dwell prior to sending sample for cell count and diff.  He is asymptomatic now without any e/o sepsis. Discussed with ER physician.  Will give vanco 2 grams and cefepime 2 grams tonight and then can d/c home and follow up in PD unit first thing in am 10/16.  vano will provide coverage for 3 days and loading dose of cefepime should also cover for any potential gram negatives until he is seen in PD clinic.  I will follow up on cultures.      Cesar should return to ER if worsening abdominal pain, fevers or low blood pressures at home (as PD patient he is instructed to check BP and orthostatics daily).    Merline Muñiz MD  [facility]  Department of Medicine  Division of Renal Disease and Hypertension  [phone]

## 2017-10-15 NOTE — PHARMACY-VANCOMYCIN DOSING SERVICE
Pharmacy Vancomycin Initial Note  Date of Service 2017  Patient's  1965  52 year old, male    Indication: bacterial peritonitis secondary to PD     Current estimated CrCl = Estimated Creatinine Clearance: 10.4 mL/min (based on Cr of 10.2).    Creatinine for last 3 days  10/14/2017:  2:55 PM Creatinine 10.20 mg/dL    Recent Vancomycin Level(s) for last 3 days  No results found for requested labs within last 72 hours.      Vancomycin IV Administrations (past 72 hours)      No vancomycin orders with administrations in past 72 hours.                Nephrotoxins and other renal medications (Future)    Start     Dose/Rate Route Frequency Ordered Stop    10/14/17 2123  vancomycin (VANCOCIN) 2,000 mg in NaCl 0.9 % 500 mL intermittent infusion      2,000 mg  over 2 Hours Intravenous ONCE 10/14/17 2122      10/14/17 2122  vancomycin place darby - receiving intermittent dosing      1 each Does not apply SEE ADMIN INSTRUCTIONS 10/14/17 2122            Contrast Orders - past 72 hours     None                Plan:  1.  Start vancomycin  2000 mg (20.3 mg/kg) IV once. Further doses based on levels.   2.  Goal Trough Level: 15-20 mg/L   3.  Pharmacy will check trough levels as appropriate in 1-3 Days.    4. Serum creatinine levels will be ordered daily for the first week of therapy and at least twice weekly for subsequent weeks.    5. Gunter method utilized to dose vancomycin therapy: Method 2    Butch Tilley,PharmD  PGY-2 Critical Care Pharmacy Resident

## 2017-10-19 LAB
BACTERIA SPEC CULT: NO GROWTH
SPECIMEN SOURCE: NORMAL

## 2017-10-19 ASSESSMENT — ENCOUNTER SYMPTOMS
ABDOMINAL PAIN: 1
FLANK PAIN: 0
DYSURIA: 0
BLOOD IN STOOL: 0
SHORTNESS OF BREATH: 0

## 2017-10-23 DIAGNOSIS — Z99.2 PERITONEAL DIALYSIS CATHETER IN PLACE (H): Primary | ICD-10-CM

## 2017-10-23 DIAGNOSIS — E83.39 HYPERPHOSPHATEMIA: ICD-10-CM

## 2017-10-23 DIAGNOSIS — T85.611A PERITONEAL DIALYSIS CATHETER DYSFUNCTION, INITIAL ENCOUNTER (H): ICD-10-CM

## 2017-11-15 ENCOUNTER — TELEPHONE (OUTPATIENT)
Dept: TRANSPLANT | Facility: CLINIC | Age: 52
End: 2017-11-15

## 2017-11-15 NOTE — TELEPHONE ENCOUNTER
Coordinator called pt to let him know he is back-up on a potential kidney chain on 12/20/17. Pt will be notified by coordinator if he becomes primary. Pt is very excited. States he finished antibiotics two weeks ago for peritonitis. Staff msg sent to Dr. Muñiz to update her.

## 2017-11-16 ENCOUNTER — APPOINTMENT (OUTPATIENT)
Dept: LAB | Facility: CLINIC | Age: 52
End: 2017-11-16
Attending: SURGERY
Payer: COMMERCIAL

## 2017-11-16 PROCEDURE — 86825 HLA X-MATH NON-CYTOTOXIC: CPT | Performed by: SURGERY

## 2017-11-16 PROCEDURE — 86833 HLA CLASS II HIGH DEFIN QUAL: CPT | Performed by: SURGERY

## 2017-11-16 PROCEDURE — 86832 HLA CLASS I HIGH DEFIN QUAL: CPT | Performed by: SURGERY

## 2017-11-17 ENCOUNTER — RESULTS ONLY (OUTPATIENT)
Dept: OTHER | Facility: CLINIC | Age: 52
End: 2017-11-17

## 2017-11-25 LAB — CROSSMATCH RESULT: NORMAL

## 2017-11-28 ENCOUNTER — DOCUMENTATION ONLY (OUTPATIENT)
Dept: TRANSPLANT | Facility: CLINIC | Age: 52
End: 2017-11-28

## 2017-11-28 NOTE — PROGRESS NOTES
"Per Dr. Muñiz EPIC message on 11/15,  Sum - this person is not having great drains, even when he has diarrhea it does not drain well so we think less likely related to constipation.  I have been on his case to get XR done but he still hasn't t had it done.  Please just keep him on your radar.   X ray of KUB on 11/27/2017 showed:  IMPRESSION: Nonobstructive bowel gas pattern. Moderate amount of stool throughout the partially visualized loops of bowel. Peritoneal dialysis catheter with its tip projecting over the right lower quadrant.  Dr. Tay notified via e-mail and recommended:  \"It seems he is constipated more than diarrhea per X ray of KUB imaging. I think that we could schedule surgical reposition PD Cath but constipation must be resolved prior to surgery.\"  Dr. Tay agreed with the plan to schedule PD cath reposition surgery.  "

## 2017-11-29 NOTE — PROGRESS NOTES
Writer called and spoke with Monica VALLE PD RN, who stated that the patient is leaving town today and will be back in next a couple of weeks. He also reported that the patient can wait until he returns to town for surgery. Please call the patient to find out when he will be available for surgery and let me know. If his PD catheter slow drain resolved and we ll cancel surgery. PD RN verbalized acceptance and understanding of plan.  Dr. Tay notified via e-mail.

## 2017-12-06 ENCOUNTER — TELEPHONE (OUTPATIENT)
Dept: TRANSPLANT | Facility: CLINIC | Age: 52
End: 2017-12-06

## 2017-12-06 NOTE — TELEPHONE ENCOUNTER
Coordinator spoke with pt about kidney transplant chain that he is back-up for on 12/19/17. Pt will be NPO starting the evening of 12/18/17 in the event he would become primary. Coordinator will notify pt if he does not become primary on 12/19/17. Pt understands if he were to become primary that he would be admitted the night before. Pt verbalizes understanding and has no further questions.

## 2017-12-18 DIAGNOSIS — T85.611D PERITONEAL DIALYSIS CATHETER DYSFUNCTION, SUBSEQUENT ENCOUNTER (H): Primary | ICD-10-CM

## 2017-12-18 RX ORDER — GENTAMICIN SULFATE 1 MG/G
CREAM TOPICAL DAILY
Qty: 30 G | Refills: 11 | Status: SHIPPED | OUTPATIENT
Start: 2017-12-18 | End: 2017-12-18

## 2017-12-18 RX ORDER — GENTAMICIN SULFATE 1 MG/G
CREAM TOPICAL DAILY
Qty: 30 G | Refills: 11 | Status: SHIPPED | OUTPATIENT
Start: 2017-12-18 | End: 2018-11-12

## 2017-12-19 ENCOUNTER — TELEPHONE (OUTPATIENT)
Dept: TRANSPLANT | Facility: CLINIC | Age: 52
End: 2017-12-19

## 2017-12-19 NOTE — TELEPHONE ENCOUNTER
Coordinator called pt to state recipient in front of him for kidney today is in the OR. Ok for pt to eat.

## 2018-01-04 ENCOUNTER — ANESTHESIA EVENT (OUTPATIENT)
Dept: SURGERY | Facility: CLINIC | Age: 53
End: 2018-01-04
Payer: COMMERCIAL

## 2018-01-05 ENCOUNTER — ANESTHESIA (OUTPATIENT)
Dept: SURGERY | Facility: CLINIC | Age: 53
End: 2018-01-05
Payer: COMMERCIAL

## 2018-01-05 ENCOUNTER — HOSPITAL ENCOUNTER (OUTPATIENT)
Facility: CLINIC | Age: 53
Discharge: HOME | End: 2018-01-05
Attending: TRANSPLANT SURGERY | Admitting: TRANSPLANT SURGERY
Payer: COMMERCIAL

## 2018-01-05 VITALS
HEART RATE: 71 BPM | BODY MASS INDEX: 26.96 KG/M2 | RESPIRATION RATE: 18 BRPM | SYSTOLIC BLOOD PRESSURE: 109 MMHG | HEIGHT: 74 IN | WEIGHT: 210.1 LBS | OXYGEN SATURATION: 100 % | TEMPERATURE: 98.2 F | DIASTOLIC BLOOD PRESSURE: 78 MMHG

## 2018-01-05 DIAGNOSIS — G25.81 RESTLESS LEG SYNDROME: ICD-10-CM

## 2018-01-05 DIAGNOSIS — D63.1 ANEMIA OF CHRONIC RENAL FAILURE, STAGE 5 (H): ICD-10-CM

## 2018-01-05 DIAGNOSIS — Z76.82 ORGAN TRANSPLANT CANDIDATE: ICD-10-CM

## 2018-01-05 DIAGNOSIS — N25.81 SECONDARY RENAL HYPERPARATHYROIDISM (H): ICD-10-CM

## 2018-01-05 DIAGNOSIS — N18.5 CKD (CHRONIC KIDNEY DISEASE) STAGE 5, GFR LESS THAN 15 ML/MIN (H): ICD-10-CM

## 2018-01-05 DIAGNOSIS — E87.20 ACIDOSIS: ICD-10-CM

## 2018-01-05 DIAGNOSIS — E78.5 HYPERLIPIDEMIA, UNSPECIFIED HYPERLIPIDEMIA TYPE: ICD-10-CM

## 2018-01-05 DIAGNOSIS — N18.5 ANEMIA OF CHRONIC RENAL FAILURE, STAGE 5 (H): ICD-10-CM

## 2018-01-05 DIAGNOSIS — N18.6 ESRD ON PERITONEAL DIALYSIS (H): Primary | ICD-10-CM

## 2018-01-05 DIAGNOSIS — Z99.2 ESRD ON PERITONEAL DIALYSIS (H): Primary | ICD-10-CM

## 2018-01-05 LAB
APTT PPP: 27 SEC (ref 22–37)
BASOPHILS # BLD AUTO: 0 10E9/L (ref 0–0.2)
BASOPHILS NFR BLD AUTO: 0.2 %
CREAT SERPL-MCNC: 10.5 MG/DL (ref 0.66–1.25)
DIFFERENTIAL METHOD BLD: ABNORMAL
EOSINOPHIL # BLD AUTO: 0.3 10E9/L (ref 0–0.7)
EOSINOPHIL NFR BLD AUTO: 4.6 %
ERYTHROCYTE [DISTWIDTH] IN BLOOD BY AUTOMATED COUNT: 14.6 % (ref 10–15)
GFR SERPL CREATININE-BSD FRML MDRD: 5 ML/MIN/1.7M2
GLUCOSE BLDC GLUCOMTR-MCNC: 101 MG/DL (ref 70–99)
GLUCOSE SERPL-MCNC: 94 MG/DL (ref 70–99)
HCT VFR BLD AUTO: 33.1 % (ref 40–53)
HGB BLD-MCNC: 10.5 G/DL (ref 13.3–17.7)
IMM GRANULOCYTES # BLD: 0 10E9/L (ref 0–0.4)
IMM GRANULOCYTES NFR BLD: 0.3 %
INR PPP: 0.88 (ref 0.86–1.14)
LYMPHOCYTES # BLD AUTO: 1.9 10E9/L (ref 0.8–5.3)
LYMPHOCYTES NFR BLD AUTO: 29.9 %
MCH RBC QN AUTO: 30.2 PG (ref 26.5–33)
MCHC RBC AUTO-ENTMCNC: 31.7 G/DL (ref 31.5–36.5)
MCV RBC AUTO: 95 FL (ref 78–100)
MONOCYTES # BLD AUTO: 0.5 10E9/L (ref 0–1.3)
MONOCYTES NFR BLD AUTO: 7.2 %
NEUTROPHILS # BLD AUTO: 3.6 10E9/L (ref 1.6–8.3)
NEUTROPHILS NFR BLD AUTO: 57.8 %
NRBC # BLD AUTO: 0 10*3/UL
NRBC BLD AUTO-RTO: 0 /100
PLATELET # BLD AUTO: 220 10E9/L (ref 150–450)
POTASSIUM SERPL-SCNC: 5.2 MMOL/L (ref 3.4–5.3)
RBC # BLD AUTO: 3.48 10E12/L (ref 4.4–5.9)
WBC # BLD AUTO: 6.3 10E9/L (ref 4–11)

## 2018-01-05 PROCEDURE — 82962 GLUCOSE BLOOD TEST: CPT

## 2018-01-05 PROCEDURE — 25000125 ZZHC RX 250: Performed by: NURSE ANESTHETIST, CERTIFIED REGISTERED

## 2018-01-05 PROCEDURE — 40000170 ZZH STATISTIC PRE-PROCEDURE ASSESSMENT II: Performed by: TRANSPLANT SURGERY

## 2018-01-05 PROCEDURE — 82947 ASSAY GLUCOSE BLOOD QUANT: CPT | Mod: 91 | Performed by: CLINICAL NURSE SPECIALIST

## 2018-01-05 PROCEDURE — 71000013 ZZH RECOVERY PHASE 1 LEVEL 1 EA ADDTL HR: Performed by: TRANSPLANT SURGERY

## 2018-01-05 PROCEDURE — 25000128 H RX IP 250 OP 636: Performed by: TRANSPLANT SURGERY

## 2018-01-05 PROCEDURE — 25000125 ZZHC RX 250: Performed by: TRANSPLANT SURGERY

## 2018-01-05 PROCEDURE — 25000128 H RX IP 250 OP 636: Performed by: RESIDENTIAL TREATMENT FACILITY, PHYSICAL DISABILITIES

## 2018-01-05 PROCEDURE — 71000012 ZZH RECOVERY PHASE 1 LEVEL 1 FIRST HR: Performed by: TRANSPLANT SURGERY

## 2018-01-05 PROCEDURE — 27210794 ZZH OR GENERAL SUPPLY STERILE: Performed by: TRANSPLANT SURGERY

## 2018-01-05 PROCEDURE — 85730 THROMBOPLASTIN TIME PARTIAL: CPT | Performed by: CLINICAL NURSE SPECIALIST

## 2018-01-05 PROCEDURE — 25000128 H RX IP 250 OP 636: Performed by: NURSE ANESTHETIST, CERTIFIED REGISTERED

## 2018-01-05 PROCEDURE — C9399 UNCLASSIFIED DRUGS OR BIOLOG: HCPCS | Performed by: NURSE ANESTHETIST, CERTIFIED REGISTERED

## 2018-01-05 PROCEDURE — 85610 PROTHROMBIN TIME: CPT | Performed by: CLINICAL NURSE SPECIALIST

## 2018-01-05 PROCEDURE — 36415 COLL VENOUS BLD VENIPUNCTURE: CPT | Performed by: CLINICAL NURSE SPECIALIST

## 2018-01-05 PROCEDURE — 85025 COMPLETE CBC W/AUTO DIFF WBC: CPT | Performed by: CLINICAL NURSE SPECIALIST

## 2018-01-05 PROCEDURE — 37000008 ZZH ANESTHESIA TECHNICAL FEE, 1ST 30 MIN: Performed by: TRANSPLANT SURGERY

## 2018-01-05 PROCEDURE — 37000009 ZZH ANESTHESIA TECHNICAL FEE, EACH ADDTL 15 MIN: Performed by: TRANSPLANT SURGERY

## 2018-01-05 PROCEDURE — 82565 ASSAY OF CREATININE: CPT | Performed by: CLINICAL NURSE SPECIALIST

## 2018-01-05 PROCEDURE — 36000059 ZZH SURGERY LEVEL 3 EA 15 ADDTL MIN UMMC: Performed by: TRANSPLANT SURGERY

## 2018-01-05 PROCEDURE — 71000027 ZZH RECOVERY PHASE 2 EACH 15 MINS: Performed by: TRANSPLANT SURGERY

## 2018-01-05 PROCEDURE — 36000057 ZZH SURGERY LEVEL 3 1ST 30 MIN - UMMC: Performed by: TRANSPLANT SURGERY

## 2018-01-05 PROCEDURE — 84132 ASSAY OF SERUM POTASSIUM: CPT | Performed by: CLINICAL NURSE SPECIALIST

## 2018-01-05 PROCEDURE — 25000566 ZZH SEVOFLURANE, EA 15 MIN: Performed by: TRANSPLANT SURGERY

## 2018-01-05 PROCEDURE — 25000128 H RX IP 250 OP 636: Performed by: CLINICAL NURSE SPECIALIST

## 2018-01-05 RX ORDER — CEFAZOLIN SODIUM 2 G/100ML
2 INJECTION, SOLUTION INTRAVENOUS
Status: COMPLETED | OUTPATIENT
Start: 2018-01-05 | End: 2018-01-05

## 2018-01-05 RX ORDER — SODIUM CHLORIDE 9 MG/ML
INJECTION, SOLUTION INTRAVENOUS CONTINUOUS
Status: DISCONTINUED | OUTPATIENT
Start: 2018-01-05 | End: 2018-01-05 | Stop reason: HOSPADM

## 2018-01-05 RX ORDER — LIDOCAINE HYDROCHLORIDE 20 MG/ML
INJECTION, SOLUTION INFILTRATION; PERINEURAL PRN
Status: DISCONTINUED | OUTPATIENT
Start: 2018-01-05 | End: 2018-01-05

## 2018-01-05 RX ORDER — ONDANSETRON 2 MG/ML
4 INJECTION INTRAMUSCULAR; INTRAVENOUS EVERY 30 MIN PRN
Status: DISCONTINUED | OUTPATIENT
Start: 2018-01-05 | End: 2018-01-05 | Stop reason: HOSPADM

## 2018-01-05 RX ORDER — FENTANYL CITRATE 50 UG/ML
25-50 INJECTION, SOLUTION INTRAMUSCULAR; INTRAVENOUS EVERY 5 MIN PRN
Status: DISCONTINUED | OUTPATIENT
Start: 2018-01-05 | End: 2018-01-05 | Stop reason: HOSPADM

## 2018-01-05 RX ORDER — DEXAMETHASONE SODIUM PHOSPHATE 4 MG/ML
INJECTION, SOLUTION INTRA-ARTICULAR; INTRALESIONAL; INTRAMUSCULAR; INTRAVENOUS; SOFT TISSUE PRN
Status: DISCONTINUED | OUTPATIENT
Start: 2018-01-05 | End: 2018-01-05

## 2018-01-05 RX ORDER — PROPOFOL 10 MG/ML
INJECTION, EMULSION INTRAVENOUS PRN
Status: DISCONTINUED | OUTPATIENT
Start: 2018-01-05 | End: 2018-01-05

## 2018-01-05 RX ORDER — MEPERIDINE HYDROCHLORIDE 50 MG/ML
12.5 INJECTION INTRAMUSCULAR; INTRAVENOUS; SUBCUTANEOUS
Status: DISCONTINUED | OUTPATIENT
Start: 2018-01-05 | End: 2018-01-05 | Stop reason: HOSPADM

## 2018-01-05 RX ORDER — LIDOCAINE HYDROCHLORIDE 10 MG/ML
INJECTION, SOLUTION EPIDURAL; INFILTRATION; INTRACAUDAL; PERINEURAL PRN
Status: DISCONTINUED | OUTPATIENT
Start: 2018-01-05 | End: 2018-01-05 | Stop reason: HOSPADM

## 2018-01-05 RX ORDER — SENNOSIDES A AND B 8.6 MG/1
1 TABLET, FILM COATED ORAL DAILY
Qty: 100 TABLET | Refills: 0 | Status: SHIPPED | OUTPATIENT
Start: 2018-01-05 | End: 2020-10-26

## 2018-01-05 RX ORDER — ONDANSETRON 4 MG/1
4 TABLET, ORALLY DISINTEGRATING ORAL EVERY 30 MIN PRN
Status: DISCONTINUED | OUTPATIENT
Start: 2018-01-05 | End: 2018-01-05 | Stop reason: HOSPADM

## 2018-01-05 RX ORDER — ONDANSETRON 4 MG/1
4 TABLET, FILM COATED ORAL EVERY 8 HOURS PRN
Qty: 18 TABLET | Refills: 0 | Status: SHIPPED | OUTPATIENT
Start: 2018-01-05 | End: 2018-03-06

## 2018-01-05 RX ORDER — OXYCODONE HYDROCHLORIDE 5 MG/1
5 TABLET ORAL EVERY 6 HOURS PRN
Qty: 18 TABLET | Refills: 0 | Status: ON HOLD | OUTPATIENT
Start: 2018-01-05 | End: 2018-01-10

## 2018-01-05 RX ORDER — ACETAMINOPHEN 10 MG/ML
1000 INJECTION, SOLUTION INTRAVENOUS ONCE
Status: COMPLETED | OUTPATIENT
Start: 2018-01-05 | End: 2018-01-05

## 2018-01-05 RX ORDER — NALOXONE HYDROCHLORIDE 0.4 MG/ML
.1-.4 INJECTION, SOLUTION INTRAMUSCULAR; INTRAVENOUS; SUBCUTANEOUS
Status: DISCONTINUED | OUTPATIENT
Start: 2018-01-05 | End: 2018-01-05 | Stop reason: HOSPADM

## 2018-01-05 RX ORDER — ONDANSETRON 2 MG/ML
INJECTION INTRAMUSCULAR; INTRAVENOUS PRN
Status: DISCONTINUED | OUTPATIENT
Start: 2018-01-05 | End: 2018-01-05

## 2018-01-05 RX ORDER — SODIUM CHLORIDE, SODIUM LACTATE, POTASSIUM CHLORIDE, CALCIUM CHLORIDE 600; 310; 30; 20 MG/100ML; MG/100ML; MG/100ML; MG/100ML
INJECTION, SOLUTION INTRAVENOUS CONTINUOUS
Status: DISCONTINUED | OUTPATIENT
Start: 2018-01-05 | End: 2018-01-05 | Stop reason: HOSPADM

## 2018-01-05 RX ORDER — FENTANYL CITRATE 50 UG/ML
INJECTION, SOLUTION INTRAMUSCULAR; INTRAVENOUS PRN
Status: DISCONTINUED | OUTPATIENT
Start: 2018-01-05 | End: 2018-01-05

## 2018-01-05 RX ADMIN — SUGAMMADEX 200 MG: 100 INJECTION, SOLUTION INTRAVENOUS at 08:45

## 2018-01-05 RX ADMIN — Medication 100 MCG: at 08:15

## 2018-01-05 RX ADMIN — ROCURONIUM BROMIDE 50 MG: 10 INJECTION INTRAVENOUS at 07:56

## 2018-01-05 RX ADMIN — FENTANYL CITRATE 25 MCG: 50 INJECTION, SOLUTION INTRAMUSCULAR; INTRAVENOUS at 09:02

## 2018-01-05 RX ADMIN — ONDANSETRON 4 MG: 2 INJECTION INTRAMUSCULAR; INTRAVENOUS at 08:47

## 2018-01-05 RX ADMIN — DEXAMETHASONE SODIUM PHOSPHATE 8 MG: 4 INJECTION, SOLUTION INTRA-ARTICULAR; INTRALESIONAL; INTRAMUSCULAR; INTRAVENOUS; SOFT TISSUE at 08:10

## 2018-01-05 RX ADMIN — FENTANYL CITRATE 50 MCG: 50 INJECTION, SOLUTION INTRAMUSCULAR; INTRAVENOUS at 07:54

## 2018-01-05 RX ADMIN — Medication 50 MCG: at 08:23

## 2018-01-05 RX ADMIN — ROCURONIUM BROMIDE 10 MG: 10 INJECTION INTRAVENOUS at 08:45

## 2018-01-05 RX ADMIN — CEFAZOLIN SODIUM 2 G: 2 INJECTION, SOLUTION INTRAVENOUS at 08:05

## 2018-01-05 RX ADMIN — MIDAZOLAM 2 MG: 1 INJECTION INTRAMUSCULAR; INTRAVENOUS at 07:40

## 2018-01-05 RX ADMIN — Medication 50 MCG: at 08:21

## 2018-01-05 RX ADMIN — PROPOFOL 150 MG: 10 INJECTION, EMULSION INTRAVENOUS at 07:55

## 2018-01-05 RX ADMIN — ROCURONIUM BROMIDE 10 MG: 10 INJECTION INTRAVENOUS at 08:27

## 2018-01-05 RX ADMIN — ACETAMINOPHEN 1000 MG: 10 INJECTION, SOLUTION INTRAVENOUS at 08:30

## 2018-01-05 RX ADMIN — LIDOCAINE HYDROCHLORIDE 100 MG: 20 INJECTION, SOLUTION INFILTRATION; PERINEURAL at 07:54

## 2018-01-05 RX ADMIN — SODIUM CHLORIDE: 9 INJECTION, SOLUTION INTRAVENOUS at 07:40

## 2018-01-05 NOTE — IP AVS SNAPSHOT
Same Day Surgery [address] [address]    Phone:  [phone]                                       After Visit Summary   1/5/2018    Cesar Villalobos    MRN: [id number]           After Visit Summary Signature Page     I have received my discharge instructions, and my questions have been answered. I have discussed any challenges I see with this plan with the nurse or doctor.    ..........................................................................................................................................  Patient/Patient Representative Signature      ..........................................................................................................................................  Patient Representative Print Name and Relationship to Patient    ..................................................               ................................................  Date                                            Time    ..........................................................................................................................................  Reviewed by Signature/Title    ...................................................              ..............................................  Date                                                            Time

## 2018-01-05 NOTE — H&P
"[facility] H&P Transplant Note    Cesar Villalobos MRN# [id number]   Age: 52 year old YOB: 1965     Date of Admission:  1/5/2018                                Impression and Recommendation:   Impression:   Mr Cesar Villalobos is 51 yo male with ESKD on peritoneal dialysis with a malfunction peritoneal dialysis catheter who presents today for laparoscopic catheter repositioning.        Recommendations:   Orders Placed This Encounter   Procedures     CBC with platelets differential     Potassium     Creatinine     INR     Partial thromboplastin time     Glucose     Glucose by meter     Obtain affirmation of informed consent     Glucose monitor nursing POCT     Cleanse skin for procedure     Notify Provider - Anticoagulants and Antiplatelets     Apply Pneumatic Compression Device (PCD)     Notify ANESTHESIA     Glucose monitor nursing POCT - Glycemic Management PERIOP     Pneumatic Compression Device (PCD) (Equipment)     Procedures: Peritoneal dialysis catheter repositioning                Chief Complaint:       52 year old male with ESKD, history of HTN, anemia, and dyslipidemia who presents for laparoscopic peritoneal dialysis repositioning today due to abdominal pain, related to peritoneal dialysis, on AXR the catheter tip migrated in the right iliac fossa. Patient reports that he had discoloration of fluid from peritoneal dialysis. He also notes seeing \"fibrin\" in the fluid. He denies fevers. He had an infection about 1.5 months ago. He last used antibiotics during the first week of September. He notes that does make urine and does not typically retain fluid. Last time he has had urinary retention after the catheter placement and would like to keep the granados for a few days after the surgery.          Past Medical History:     Past Medical History:   Diagnosis Date     Anemia in chronic kidney disease      CKD (chronic kidney disease), stage IV (H)      Dyslipidemia      Hypertension              " Past Surgical History:     Past Surgical History:   Procedure Laterality Date     HERNIA REPAIR, INGUINAL RT/LT Left     as child     LAPAROSCOPIC INSERTION CATHETER PERITONEAL DIALYSIS N/A 6/6/2017    Procedure: LAPAROSCOPIC INSERTION CATHETER PERITONEAL DIALYSIS;  Laparoscopic Peritoneal Dialysis Catheter Placement. ;  Surgeon: Caden Tay MD;  Location:  OR             Social History:     Social History   Substance Use Topics     Smoking status: Never Smoker     Smokeless tobacco: Never Used     Alcohol use 0.0 oz/week     0 Standard drinks or equivalent per week      Comment: 2 beers monthly             Family History:     Family History   Problem Relation Age of Onset     CANCER Brother      KIDNEY DISEASE Brother      due to XRT     HEART DISEASE Sister      Family history reviewed and updated in EPIC and reviewed         Allergies:     Allergies   Allergen Reactions     Nsaids      Swelling and Hives               Medications:     Current Facility-Administered Medications   Medication     HOLD: Antiplatelets, NSAIDs and anticoagulants - pt may take routine po meds with 30 mL water AM of surgery     ceFAZolin (ANCEF) intermittent infusion 2 g in 100 mL dextrose PRE-MIX     0.9% sodium chloride infusion            Organs Lab:   BMP, CBC, Ph, Mg          Review of Systems:   The Review of Systems is negative other than noted in the HPI          Physical Exam:   Vitals were reviewed  Temp: 97.5  F (36.4  C) Temp src: Oral BP: 109/82 Pulse: 73     SpO2: 99 % O2 Device: None (Room air)      Constitutional:   awake, alert, cooperative, no apparent distress, and appears stated age     Neck:   Supple, symmetrical, trachea midline, no adenopathy, thyroid symmetric, not enlarged and no tenderness, skin normal     Lungs:   No increased work of breathing, good air exchange, clear to auscultation bilaterally, no crackles or wheezing     Cardiovascular:   Normal apical impulse, regular rate and rhythm, normal S1  and S2, no S3 or S4, and no murmur noted     Abdomen:   There is a peritoneal dialysis catheter in the left paraumbilical area, normal bowel sounds, soft, non-distended, non-tender, no masses palpated, no hepatosplenomegally     Musculoskeletal:   There is no redness, warmth, or swelling of the joints.  Full range of motion noted.  Motor strength is 5 out of 5 all extremities bilaterally.  Tone is normal.

## 2018-01-05 NOTE — ANESTHESIA POSTPROCEDURE EVALUATION
Patient: Cesar Villalobos    Procedure(s):  Laparoscopic Repositioning of Peritoneal Dialysis Catheter. - Wound Class: I-Clean    Diagnosis:Complication with Catheter  Diagnosis Additional Information: No value filed.    Anesthesia Type:  General, ETT    Note:  Anesthesia Post Evaluation    Patient location during evaluation: PACU  Patient participation: Able to fully participate in evaluation  Level of consciousness: awake and alert  Pain management: adequate  Airway patency: patent  Cardiovascular status: hemodynamically stable  Respiratory status: acceptable  Hydration status: acceptable  PONV: none             Last vitals:  Vitals:    01/05/18 0915 01/05/18 0930 01/05/18 0945   BP: 108/80 122/71 120/74   Pulse:      Resp: 16 16 16   Temp: 35.9  C (96.6  F) 36.1  C (97  F) 36.3  C (97.3  F)   SpO2: 98% 100% 100%         Electronically Signed By: Adalgisa Pham MD  January 5, 2018  10:03 AM

## 2018-01-05 NOTE — OP NOTE
Transplant Surgery  Operative Note    Preop Dx:  End Stage renal failure.  Postop Dx: Same  Procedure: Laparoscopic PD catheter repositioning.  Surgeon: Caden Tay M.D., Ph.D.  Fellow: Antonio Edge fellow.    Anesthesia: General.  EBL: 2 ml  Fluids: 500 cc crystalloid  UO: 150 ml  Drains: None.  Specimen: None.  Complications: None.  Findings: Catheter twisted around the appendix with some fibrin tissue but no evidence of infectino..     Indication: The patient has End Stage kidney failure and is in need of permanent dialysis access.  He had a PD catheter placed in a prior operation.  He was having pain with draining of PD fluid and I was requested to reposition the catheter.  After discussing the risks and benefits of proceeding, the patient agreed to proceed with surgery and provided informed consent.     Procedure: The patient was brought to the operating room and placed supine on the operating table.   After induction of general anesthesia, the abdomen was prepped and draped in the usual fashion.  A granados catheter and orogastric tube were placed to decompress the bladder and stomach. A time-out was performed to ensure the correct patient and procedure.  Perioperative antibiotics were given. The peritoneal cavity was accessed with PD catheter and CO2 insufflation to 12 mm H20 was done. An incision paramedian left was made and a 5 mm scope was introduced into the abdomen using a Visiport. A second port was placed for manipulation of the cahteter.  The laparoscopic survey did reveal catheter in the right iliac fossa in complex association with the appendix.  It appeared as if the appendix was wrapped around the catheter.  We unfurled the wrap and repositioned the catheter down in the pelvis.  We tacked the catheter in place with 2-0 vicryl using suture passer.  Flow was tested and was excellent.     All the ports were removed.  The skin incisions were closed with Monocryl and Dermabond and the sterile extension  set and minicap was placed on the end of the catheter.  A sterile occlusive dressing was applied over the catheter exit site.  All needle and sponge counts were correct x 2.  The patient was awakened from anesthesia and transported to the recovery room, having tolerated the procedure well.  There were no apparent complications.   Faculty was present for the critical portions of the procedure.     ATTESTATION:    I was present during the key portions of the procedure, and I was immediately available for the entire procedure between opening and closing.    Caden Tay MD, PhD  Assistant Professor of Surgery

## 2018-01-05 NOTE — ANESTHESIA CARE TRANSFER NOTE
Patient: Cesar Villalobos    Procedure(s):  Laparoscopic Repositioning of Peritoneal Dialysis Catheter - Wound Class: I-Clean    Diagnosis: Complication with Catheter  Diagnosis Additional Information: No value filed.    Anesthesia Type:   General, ETT     Note:  Airway :Face Mask  Patient transferred to:PACU  Comments: Pt extubated in the OR without incident or complications. Pt VSS upon arrival to the PACU. Pt has no c/o pain/N/V. Pt care report given to receiving RN.       Vitals: (Last set prior to Anesthesia Care Transfer)    CRNA VITALS  1/5/2018 0826 - 1/5/2018 0901      1/5/2018             Pulse: 83    SpO2: 99 %                Electronically Signed By: RUPA Sanchez CRNA  January 5, 2018  9:01 AM

## 2018-01-05 NOTE — DISCHARGE INSTRUCTIONS
[facility], [place]  Same-Day Surgery   Adult Discharge Orders & Instructions     For 24 hours after surgery    1. Get plenty of rest.  A responsible adult must stay with you for at least 24 hours after you leave the hospital.   2. Do not drive or use heavy equipment.  If you have weakness or tingling, don't drive or use heavy equipment until this feeling goes away.  3. Do not drink alcohol.  4. Avoid strenuous or risky activities.  Ask for help when climbing stairs.   5. You may feel lightheaded.  IF so, sit for a few minutes before standing.  Have someone help you get up.   6. If you have nausea (feel sick to your stomach): Drink only clear liquids such as apple juice, ginger ale, broth or 7-Up.  Rest may also help.  Be sure to drink enough fluids.  Move to a regular diet as you feel able.  7. You may have a slight fever. Call the doctor if your fever is over 100 F (37.7 C) (taken under the tongue) or lasts longer than 24 hours.  8. You may have a dry mouth, a sore throat, muscle aches or trouble sleeping.  These should go away after 24 hours.  9. Do not make important or legal decisions.   Call your doctor for any of the followin.  Signs of infection (fever, growing tenderness at the surgery site, a large amount of drainage or bleeding, severe pain, foul-smelling drainage, redness, swelling).    2. It has been over 8 to 10 hours since surgery and you are still not able to urinate (pass water).    3.  Headache for over 24 hours.      To contact a doctor, call Dr Tay's office at [phone]  X   [phone] and ask for the resident on call for   ______Transplant_____ (answered 24 hours a day)  X   Emergency Department:    [facility]: [phone]                Tips for taking pain medications  To get the best pain relief possible , remember these points:      Take pain medications as directed, before pain becomes severe      Pain medication can upset your stomach:  taking it with food may help      Constipation is a common side effect of pain medication. Drink plenty of  Fluids      Eat foods high in fiber. Take a stool softener  if recommended by your doctor or  Pharmacist.        Do not drink alcohol, drive or operate machinery while taking pain medications.      Ask about other ways to control pain, such as with heat, ice or relaxation.

## 2018-01-05 NOTE — IP AVS SNAPSHOT
MRN:[id number]                      After Visit Summary   1/5/2018    Cesar Villalobos    MRN: [id number]           Thank you!     Thank you for choosing [facility] for your care. Our goal is always to provide you with excellent care. Hearing back from our patients is one way we can continue to improve our services. Please take a few minutes to complete the written survey that you may receive in the mail after you visit with us. Thank you!        Patient Information     Date Of Birth          1965        About your hospital stay     You were admitted on:  January 5, 2018 You last received care in the:  Same Day Surgery [facility]    You were discharged on:  January 5, 2018        Reason for your hospital stay       The patient underwent today 01/05/2018 a laparoscopic PD catheter repositioning under general anesthesia.                  Who to Call     For medical emergencies, please call 911.  For non-urgent questions about your medical care, please call your primary care provider or clinic, [phone]  For questions related to your surgery, please call your surgery clinic        Attending Provider     Provider Caden Basurto MD Transplant       Primary Care Provider Office Phone # Fax #    Sallie Olsen [phone]       When to contact your care team       Your transplant coordinator if you have any of the following:   Swelling, oozing, worsening pain, unusual redness around the incision, or if:  Fever of 101.5 F or higher   Increasing abdominal pain   Nausea or vomiting   Severe diarrhea, bloating, or constipation     Any concerns or questions, please call your Transplant coordinator:    Phone: [phone].  If they are not available, the on call coordinator/MD will help you with your concern.                  After Care Instructions     Activity       Peritoneal Dialysis Catheter post op discharge order/instructions   A peritoneal dialysis  catheter has been placed into your abdomen (belly). The following are instructions for care of your catheter:  Keep your catheter insertion sites dry and covered at all times. To prevent infection, NEVER REMOVE DRESSING OR PEEK UNDER THE DRESSING. The home dialysis nurse will change your dressing the first time. You will be trained on how to change your dressing.  If the dressing becomes wet from bloody drainage, add 4x4 sterile gauze with tape.  If your dressing becomes soaked, call the home dialysis nurse in your area  DO NOT take a shower while the catheter site is healing. This usually takes 2-4 weeks. Take sponge baths only. You CANNOT ever take a tub bath or swimming while you have a peritoneal dialysis catheter.  The catheter should be kept in place at all times. This is done by having the catheter taped down to your skin to avoid tugging or pulling.   Activity guidelines:  Do not lift anything over 20 lbs for the first month  Avoid climbing stairs it at all possible  Avoid getting constipated or straining with your bowel movements  Take your medicines as ordered by your doctor.  Call the home dialysis unit in your area to schedule an appointment to have your dressing changed and education on the care of your catheter.  CALL YOUR HEALTH CARE PROVIDER RIGHT AWAY IF YOU HAVE ANY OF THE FOLLOWING:  Fever of 100.4 F (38.0 C) or higher  Chills  Pain in your abdomen or around your catheter  Hardness, warmth, redness or drainage around you catheter  Block flow into or out of your catheter  Dialysate that is cloudy or bloody when it drains from your body  Part of your belly appears to be bulging out (this is called a hernia)  Severe coughing            Diet       Follow this diet upon discharge: Renal            Wound care and dressings       Instructions to care for your wound at home: keep wound clean and dry.                  Follow-up Appointments     Follow Up and recommended labs and tests       Follow up with  Dr. Hollingsworth in Transplant Clinic in 2-3 weeks.  If you need to change your appointment please contact your coordinator at [phone].                  Your next 10 appointments already scheduled     2018  3:45 PM CST   (Arrive by 3:30 PM)   Post-Op with Caden Tay MD   [facility] Transplant ([facility])    [address]  [address]  [facility] [address] [phone]              Further instructions from your care team       [facility]  Same-Day Surgery   Adult Discharge Orders & Instructions     For 24 hours after surgery    1. Get plenty of rest.  A responsible adult must stay with you for at least 24 hours after you leave the hospital.   2. Do not drive or use heavy equipment.  If you have weakness or tingling, don't drive or use heavy equipment until this feeling goes away.  3. Do not drink alcohol.  4. Avoid strenuous or risky activities.  Ask for help when climbing stairs.   5. You may feel lightheaded.  IF so, sit for a few minutes before standing.  Have someone help you get up.   6. If you have nausea (feel sick to your stomach): Drink only clear liquids such as apple juice, ginger ale, broth or 7-Up.  Rest may also help.  Be sure to drink enough fluids.  Move to a regular diet as you feel able.  7. You may have a slight fever. Call the doctor if your fever is over 100 F (37.7 C) (taken under the tongue) or lasts longer than 24 hours.  8. You may have a dry mouth, a sore throat, muscle aches or trouble sleeping.  These should go away after 24 hours.  9. Do not make important or legal decisions.   Call your doctor for any of the followin.  Signs of infection (fever, growing tenderness at the surgery site, a large amount of drainage or bleeding, severe pain, foul-smelling drainage, redness, swelling).    2. It has been over 8 to 10 hours since surgery and you are still not able to urinate (pass  "water).    3.  Headache for over 24 hours.      To contact a doctor, call Dr Tay's office at [phone]  X   [phone] and ask for the resident on call for   ______Transplant_____ (answered 24 hours a day)  X   Emergency Department:    [facility]: [phone]                Tips for taking pain medications  To get the best pain relief possible , remember these points:      Take pain medications as directed, before pain becomes severe      Pain medication can upset your stomach: taking it with food may help      Constipation is a common side effect of pain medication. Drink plenty of  Fluids      Eat foods high in fiber. Take a stool softener  if recommended by your doctor or  Pharmacist.        Do not drink alcohol, drive or operate machinery while taking pain medications.      Ask about other ways to control pain, such as with heat, ice or relaxation.      Pending Results     No orders found from 1/3/2018 to 1/6/2018.            Admission Information     Date & Time Provider Department Dept. Phone    1/5/2018 Caden Tay MD Same Day Surgery [facility] [phone]      Your Vitals Were     Blood Pressure Pulse Temperature Respirations Height Weight    112/76 71 98.9  F (37.2  C) (Oral) 16 1.88 m (6' 2\") 95.3 kg (210 lb 1.6 oz)    Pulse Oximetry BMI (Body Mass Index)                100% 26.98 kg/m2          MyChart Information     [facility] gives you secure access to your electronic health record. If you see a primary care provider, you can also send messages to your care team and make appointments. If you have questions, please call your primary care clinic.  If you do not have a primary care provider, please call [phone] and they will assist you.        Care EveryWhere ID     This is your Care EveryWhere ID. This could be used by other organizations to access your [facility] medical records  [id number]        Equal Access to Services     COLLEEN LAND: Brittany Flores, " waanamariada luqadaha, qaybta kaalmada fatuma, chandrakant camarenaaan ah. So [facility] [phone].    ATENCIÓN: Si gabriela mays, tiene a coe disposición servicios gratuitos de asistencia lingüística. Llame al [phone].    We comply with applicable federal civil rights laws and [place] laws. We do not discriminate on the basis of race, color, national origin, age, disability, sex, sexual orientation, or gender identity.               Review of your medicines      START taking        Dose / Directions    ondansetron 4 MG tablet   Commonly known as:  ZOFRAN   Used for:  ESRD on peritoneal dialysis (H)        Dose:  4 mg   Take 1 tablet (4 mg) by mouth every 8 hours as needed for nausea   Quantity:  18 tablet   Refills:  0       oxyCODONE IR 5 MG tablet   Commonly known as:  ROXICODONE   Used for:  ESRD on peritoneal dialysis (H)        Dose:  5 mg   Take 1 tablet (5 mg) by mouth every 6 hours as needed for pain maximum 5 tablet(s) per day   Quantity:  18 tablet   Refills:  0       senna 8.6 MG tablet   Commonly known as:  SENOKOT   Used for:  ESRD on peritoneal dialysis (H)        Dose:  1 tablet   Take 1 tablet by mouth daily   Quantity:  100 tablet   Refills:  0         CONTINUE these medicines which have NOT CHANGED        Dose / Directions    atorvastatin 10 MG tablet   Commonly known as:  LIPITOR   Used for:  Mixed hyperlipidemia        Dose:  10 mg   Take 1 tablet (10 mg) by mouth daily   Quantity:  90 tablet   Refills:  3       calcium acetate 667 MG Caps capsule   Commonly known as:  PHOSLO   Used for:  Hyperphosphatemia        Dose:  2001 mg   Take 3 capsules (2,001 mg) by mouth 3 times daily (with meals)   Quantity:  270 capsule   Refills:  11       calcium carbonate 500 MG chewable tablet   Commonly known as:  TUMS   Used for:  CKD (chronic kidney disease) stage 5, GFR less than 15 ml/min (H)        Dose:  1 chew tab   Take 1 chew tab by mouth 3 times daily Dose is 750mg   Refills:  0        DIALYVITE 800 0.8 MG Tabs   Used for:  ESRD (end stage renal disease) on dialysis (H)        Dose:  1 tablet   Take 1 tablet by mouth daily   Quantity:  90 tablet   Refills:  3       diltiazem 180 MG 24 hr capsule   Commonly known as:  DILT-XR   Used for:  Hypertension secondary to other renal disorders        Dose:  180 mg   Take 1 capsule (180 mg) by mouth daily   Quantity:  90 capsule   Refills:  3       fish oil-omega-3 fatty acids 1000 MG capsule        Dose:  1 capsule   Take 1 capsule by mouth daily.   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        Dose:  20 mg   Take 1 tablet (20 mg) by mouth daily   Quantity:  90 tablet   Refills:  3       MELATONIN PO        Dose:  2 mg   Take 2 mg by mouth At Bedtime   Refills:  0       sevelamer 800 MG tablet   Commonly known as:  RENAGEL   Used for:  Hyperphosphatemia        Dose:  800 mg   Take 1 tablet (800 mg) by mouth 3 times daily (with meals)   Quantity:  90 tablet   Refills:  11       sodium bicarbonate 650 MG tablet   Used for:  Acidosis        Dose:  1300 mg   Take 2 tablets (1,300 mg) by mouth 2 times daily   Quantity:  360 tablet   Refills:  3            Where to get your medicines      Some of these will need a paper prescription and others can be bought over the counter. Ask your nurse if you have questions.     Bring a paper prescription for each of these medications     ondansetron 4 MG tablet    oxyCODONE IR 5 MG tablet    senna 8.6 MG tablet                Protect others around you: Learn how to safely use, store and throw away your medicines at www.disposemymeds.org.             Medication List: This is a list of all your medications and when to take them.  Check marks below indicate your daily home schedule. Keep this list as a reference.      Medications           Morning Afternoon Evening Bedtime As Needed    atorvastatin 10 MG tablet   Commonly known as:  LIPITOR   Take 1 tablet (10 mg) by mouth daily                                calcium acetate 667 MG Caps capsule   Commonly known as:  PHOSLO   Take 3 capsules (2,001 mg) by mouth 3 times daily (with meals)                                calcium carbonate 500 MG chewable tablet   Commonly known as:  TUMS   Take 1 chew tab by mouth 3 times daily Dose is 750mg                                DIALYVITE 800 0.8 MG Tabs   Take 1 tablet by mouth daily                                diltiazem 180 MG 24 hr capsule   Commonly known as:  DILT-XR   Take 1 capsule (180 mg) by mouth daily                                fish oil-omega-3 fatty acids 1000 MG capsule   Take 1 capsule by mouth daily.                                gentamicin 0.1 % cream   Commonly known as:  GARAMYCIN   Apply topically daily                                lisinopril 20 MG tablet   Commonly known as:  PRINIVIL/ZESTRIL   Take 1 tablet (20 mg) by mouth daily                                MELATONIN PO   Take 2 mg by mouth At Bedtime                                ondansetron 4 MG tablet   Commonly known as:  ZOFRAN   Take 1 tablet (4 mg) by mouth every 8 hours as needed for nausea                                oxyCODONE IR 5 MG tablet   Commonly known as:  ROXICODONE   Take 1 tablet (5 mg) by mouth every 6 hours as needed for pain maximum 5 tablet(s) per day                                senna 8.6 MG tablet   Commonly known as:  SENOKOT   Take 1 tablet by mouth daily                                sevelamer 800 MG tablet   Commonly known as:  RENAGEL   Take 1 tablet (800 mg) by mouth 3 times daily (with meals)                                sodium bicarbonate 650 MG tablet   Take 2 tablets (1,300 mg) by mouth 2 times daily                                           More Information        Steven Catheter Removal     The syringe is put into the balloon port to allow the balloon to empty. Once the balloon is empty, the catheter can be removed.   Your healthcare provider has instructed you to remove your Steven catheter. This is a thin, flexible tube that allows urine to drain out of your bladder and into a bag. It s important to properly remove your catheter to help prevent infection and other complications. If you have any questions about removing the Steven catheter, ask your healthcare provider before trying to remove it. Otherwise, follow the instructions on this sheet.  Steven catheter  The Steven catheter is held in place by a small balloon that s filled with water. To remove the catheter, you must first drain the water from the balloon. This is done using a syringe and the balloon port. This is the opening in the catheter that isn t attached to the bag. It allows you to get to the balloon.  Instructions for removing the catheter  Follow the directions closely. Note: If the catheter doesn t come out with gentle pulling, stop and call your healthcare provider right away.    Empty the bag of urine if needed.    Wash your hands with soap and warm water. Dry them well.    Gather your supplies. This includes a syringe, wastebasket, a towel, and a syringe that was given to you by your healthcare provider.    Put the syringe into the balloon port on the catheter. The syringe fits tightly into the port with a firm push and twist motion.    Wait as the water from the balloon empties into the syringe. Depending on how large the balloon is, you may need to repeat this process several times until all of the water is out of the balloon.    Once the balloon is emptied, gently pull out the catheter.    Put the used catheter in the wastebasket. Also throw away the syringe.    Use the towel to wipe up any spilled water or urine if needed.    Wash your hands again.  When to call  your healthcare provider  Call the healthcare provider right away if:    You have a fever of 100.4 F (38 C) or higher, or as directed by your healthcare provider.    You have questions about removing the catheter.    The catheter doesn t come out with gentle pulling.    You can t urinate within 8 hours after removing the catheter.    Your belly (abdomen) is painful or bloated.    You have burning pain with urination that lasts for 24 hours.    You see a lot of blood in the urine. Light bleeding for 24 hours is normal.    It feels like the bladder is not emptying.   Date Last Reviewed: 12/1/2016 2000-2017 The [employer]. [address] [place], [place] [id number]. All rights reserved. This information is not intended as a substitute for professional medical care. Always follow your healthcare professional's instructions.                Steven Catheter Care    A Steven catheter is a rubber tube that is placed through the urethra (opening where urine comes out) and into the bladder. This helps drain urine from the bladder. There is a small balloon on the end of the tube that is inflated after insertion. This keeps the catheter from sliding out of the bladder.  A Steven catheter is used to treat urinary retention (unable to pass urine). It is also used when there is incontinence (loss of bladder control).  Home care    Finish taking any prescribed antibiotic even if you are feeling better before then.    It is important to keep bacteria from getting into the collection bag. Do not disconnect the catheter from the collection bag.    Use a leg band to secure the drainage tube, so it does not pull on the catheter. Drain the collection bag when it becomes full using the drain spout at the bottom of the bag.    Do not try to pull or remove your catheter. This will injure your urethra. It must be removed by your healthcare provider or nurse.  Follow-up care  Follow up with your healthcare provider as advised for  repeat urine testing and catheter removal or replacement.  When to seek medical advice  Call your healthcare provider right away if any of these occur:    Fever of 100.4 F (38 C) or higher, or as directed by your healthcare provider    Bladder pain or fullness    Abdominal swelling, nausea or vomiting, or back pain    Blood or urine leakage around the catheter    Bloody urine coming from the catheter (if a new symptom)    Catheter falls out    Catheter stops draining for 6 hours    Weakness, dizziness, or fainting  Date Last Reviewed: 10/1/2016    1353-9923 The "[employer]. [address] [place] [address]. All rights reserved. This information is not intended as a substitute for professional medical care. Always follow your healthcare professional's instructions.                Discharge Instructions: Caring for Your Leg Bag  You are going home with a urinary catheter and collection device (drainage bag) in place. One type of collection device is called a leg bag. This is a smaller drainage bag that you can wear on your leg to collect urine during the day. The bag can fit under your clothing. You can move around with greater ease when using a leg bag instead of a larger collection bag.  You were shown how to care for your catheter in the hospital. This sheet will help you remember those steps when you are at home.  Home care    Wash your hands thoroughly before and after you care for your catheter or collection device.    Gather your supplies:    Alcohol wipes    Soap and water    Towel and washcloth    Leg strap and leg bag    Use soap and water to wash the area where your catheter enters your body. Rinse well.    Secure the bag darby to your leg:    Position the leg band high on your thigh with the product label pointing away from your leg.    Stretch the leg band in place and fasten.    Place the catheter tubing over the bag and secure it. You may secure it with a Velcro tab or other method,  depending on the product you use. Be sure to leave enough loop in the catheter above the leg band to avoid pulling on the tube.    Every 4 to 6 hours, reposition the band to prevent pressure from the elastic on your leg. You can do this by changing the bag to the other leg or by raising or lowering the leg band.    Wash the band as frequently as needed. You can hand wash and dry the leg band.    Place the bag in the bag darby.    Clean the urine bag end of the catheter and your catheter port with an alcohol wipe.    Place a towel under the bag and port to keep urine from dripping onto your leg.    Before connecting the outlet valve at the bottom of the bag to the catheter, make sure that it is firmly closed. Flip the valve upward toward the bag; it needs to snap firmly in place. Be sure not to tug on the tubing. Be gentle.    Attach the urine bag to the end of the catheter; insert the connector snugly into the catheter port. You can avoid dribbling urine by bending the catheter tubing just below the tip and holding it while you disconnect it from the catheter. Be careful to keep the tip clean while connecting the leg bag tubing to the catheter--this keeps germs from getting into the system.    Drain the bag when it is full. To drain the bag, flip the clamp downward. Direct the flexible outlet tube to control the flow of urine. You don t have to disconnect the leg bag from the catheter to empty it. Raise your leg up to the edge of the toilet to reach the leg bag. Then you can empty the bag directly into the toilet. This way, you won t need to bend over, which may be uncomfortable.    Keep the leg bag clean. Rinse daily with equal parts water and vinegar to reduce odor and keep the bag free of germs.    Remember to keep the drainage bag below the level of your bladder for proper drainage.  Follow-up  Make a follow-up appointment as directed by your healthcare provider.  When to call your healthcare provider  Call  your healthcare provider right away if you have any of the following:    Redness, swelling, or warmth around the catheter entry site    Pus draining from your catheter entry site or into the catheter tubing and bag    Blood, clots, or floating debris in the urine    Nausea and vomiting    Shaking chills    Fever above 100.4 F (38 C)    Pain that is not relieved by medicine     Catheter that falls out or is dislodged   Date Last Reviewed: 1/1/2017 2000-2017 The [employer]. [address] [place] [address]. All rights reserved. This information is not intended as a substitute for professional medical care. Always follow your healthcare professional's instructions.                Emptying and Cleaning Your Urinary Catheter Bag  You have an indwelling urinary catheter. This drains urine from your bladder into a bag. The bag can be one that is used at the bedside. Or it can be a smaller bag that is strapped to the leg. Follow the numbered steps below to empty and clean a urinary bag.       Drain Clean tube Clean catheter   Step 1. Drain the bag    Wash your hands well with soap and water to prevent infecting the urinary catheter and bag.    If the short drainage tube is inserted into a pocket on the bag, take the drainage tube out of the pocket.    Hold the drainage tube over a toilet or measuring container. Open the valve.    Don t touch the tip of the valve or let it touch the toilet or container.    Wash your hands again.  Step 2. Clean the drainage tube    When the bag is empty, clean the tip of the drainage valve with an alcohol wipe.    Close the valve.    Reinsert the drainage tube into the pocket, if there is one.  Step 3. Clean your skin    Wash your hands well before and after cleaning your skin.    If you have a catheter (such as a Steven) that enters through the urethra, clean the urethral area with soap and water 1 time(s) daily as you were taught by your healthcare provider. You should  also clean after every bowel movement to prevent infection.    Avoid pulling on the tubing when cleaning so you don t injure the urethra.    Don t apply antibiotic ointment or any other antibacterial product to the urethra.    Don t use lubricant on the urethra.    Don t apply powder to the genital area or to the tubing.    If you have a suprapubic catheter  (one that was surgically placed into the bladder through the lower abdomen), your healthcare provider will tell you how to clean your skin around the catheter.  Step 4. Check and clean the catheter tubing    Check the tubing. If there are kinks, cracks, clogs, or you can t see into the tubing, you ll need to change to new tubing as you were shown by your healthcare provider.    If the current tubing can still be used, wash it with soap and water. Always wash the tubing in the direction away from your body. Avoid pulling on the tubing.    Dry the tubing with a clean washcloth or paper towel.  Step 5. Clean the drainage bag    Clean the drainage bag once every 3 days.    Have a clean backup bag or other drainage device ready.    Follow these steps:    Wash your hands well with soap and water.    Disconnect the bag from the catheter tubing. Connect the tubing to the backup bag or drainage device.    Drain any remaining urine from the bag you just disconnected. Close the drainage valve.    Pour some warm soapy water into the bag. Swish the soap around, being sure to get the corners of the bag.    Open the drainage valve to drain the soap. Close the valve.    Fill the bag with 2 parts vinegar and 3 parts water. Shake the solution a bit and allow it to remain in the bag for 30 minutes.    Drain the vinegar solution and rinse the bag with cold tap water.    Hang the bag to drain and air-dry.  When to call your healthcare provider  Call your healthcare provider right away if you have any of the following:    Little or no urine flowing into the bag    Urine leaking where  the catheter enters the body    Pain, burning, or redness of the area where the catheter enters the body    Bloody urine (a trace of blood is normal)    Cloudy or foul-smelling urine, or sand-like grains in your urine    Pain in your lower back or lower abdomen    Your catheter falls out    Fever of 100.4 F (38 C) or higher, or shaking chills   Date Last Reviewed: 1/1/2017 2000-2017 [employer]. [address] [place] [address]. All rights reserved. This information is not intended as a substitute for professional medical care. Always follow your healthcare professional's instructions.

## 2018-01-05 NOTE — ANESTHESIA PREPROCEDURE EVALUATION
Anesthesia Evaluation     . Pt has had prior anesthetic. Type: General    History of anesthetic complications    urinary retention      ROS/MED HX    ENT/Pulmonary:  - neg pulmonary ROS     Neurologic:  - neg neurologic ROS     Cardiovascular:     (+) hypertension----. : . . . :. .       METS/Exercise Tolerance:     Hematologic:     (+) Anemia, -      Musculoskeletal:  - neg musculoskeletal ROS       GI/Hepatic:         Renal/Genitourinary: Comment: Last PD overnight (1/2 session)  PD has been malpositioned (alarming during PD) for over 1 month    (+) chronic renal disease, type: ESRD, Pt requires dialysis, type: Peritoneal dialysis,       Endo:         Psychiatric:  - neg psychiatric ROS       Infectious Disease:         Malignancy:         Other:                     Physical Exam  Normal systems: cardiovascular, pulmonary and dental    Airway   Mallampati: II  TM distance: >3 FB  Neck ROM: full    Dental     Cardiovascular       Pulmonary                     Anesthesia Plan      History & Physical Review      ASA Status:  3 .    NPO Status:  > 8 hours    Plan for General and ETT with Intravenous induction. Maintenance will be Balanced.    PONV prophylaxis:  Ondansetron (or other 5HT-3) and Dexamethasone or Solumedrol  H/o urinary retention with prior anesthetic. Will limit narcotic use and monitor post-op.      Postoperative Care  Postoperative pain management:  Multi-modal analgesia.      Consents  Anesthetic plan, risks, benefits and alternatives discussed with:  Patient..                          .

## 2018-01-07 ENCOUNTER — OFFICE VISIT (OUTPATIENT)
Dept: URGENT CARE | Facility: URGENT CARE | Age: 53
End: 2018-01-07
Payer: COMMERCIAL

## 2018-01-07 VITALS
HEART RATE: 86 BPM | TEMPERATURE: 97.9 F | OXYGEN SATURATION: 100 % | HEIGHT: 75 IN | DIASTOLIC BLOOD PRESSURE: 72 MMHG | RESPIRATION RATE: 14 BRPM | WEIGHT: 210 LBS | SYSTOLIC BLOOD PRESSURE: 122 MMHG | BODY MASS INDEX: 26.11 KG/M2

## 2018-01-07 DIAGNOSIS — Z46.6 URINARY CATHETER INSERTION/ADJUSTMENT/REMOVAL: Primary | ICD-10-CM

## 2018-01-07 PROCEDURE — 99213 OFFICE O/P EST LOW 20 MIN: CPT | Performed by: FAMILY MEDICINE

## 2018-01-07 NOTE — MR AVS SNAPSHOT
After Visit Summary   1/7/2018    Cesar Villalobos    MRN: [id number]           Patient Information     Date Of Birth          1965        Visit Information        Provider Department      1/7/2018 12:05 PM Devin Giraldo MD [facility]        Today's Diagnoses     Urinary catheter insertion/adjustment/removal    -  1       Follow-ups after your visit        Your next 10 appointments already scheduled     Jan 22, 2018  3:45 PM CST   (Arrive by 3:30 PM)   Post-Op with Caden Tay MD   [facility] ([facility])    [address] [place] [address]  [address]  [facility] [address] [phone]              Who to contact     If you have questions or need follow up information about today's clinic visit or your schedule please contact [facility] directly at [phone].  Normal or non-critical lab and imaging results will be communicated to you by MyChart, letter or phone within 4 business days after the clinic has received the results. If you do not hear from us within 7 days, please contact the clinic through [facility] or phone. If you have a critical or abnormal lab result, we will notify you by phone as soon as possible.  Submit refill requests through [employer] or call your pharmacy and they will forward the refill request to us. Please allow 3 business days for your refill to be completed.          Additional Information About Your Visit        MyChart Information     [employer] gives you secure access to your electronic health record. If you see a primary care provider, you can also send messages to your care team and make appointments. If you have questions, please call your primary care clinic.  If you do not have a primary care provider, please call [phone] and they will assist you.        Care EveryWhere ID     This is your Care EveryWhere ID. This could be used by other organizations to access  "your [facility] medical records  [id number]        Your Vitals Were     Pulse Temperature Respirations Height Pulse Oximetry BMI (Body Mass Index)    86 97.9  F (36.6  C) (Oral) 14 6' 3\" (1.905 m) 100% 26.25 kg/m2       Blood Pressure from Last 3 Encounters:   01/07/18 122/72   01/05/18 109/78   10/15/17 115/69    Weight from Last 3 Encounters:   01/07/18 210 lb (95.3 kg)   01/05/18 210 lb 1.6 oz (95.3 kg)   10/14/17 217 lb 9.5 oz (98.7 kg)              Today, you had the following     No orders found for display       Primary Care Provider Office Phone # Fax #    Sallie Olsen [phone] [address] [place] [address]        Equal Access to Services     [facility]: Hadii aad jean marie hadasho Solizali, waaxda luqadaha, qaybta kaalmada adeegyada, waxay alexin haycali coleman . So [facility] [phone].    ATENCIÓN: Si habla español, tiene a coe disposición servicios gratuitos de asistencia lingüística. Temi al [phone].    We comply with applicable federal civil rights laws and [place] laws. We do not discriminate on the basis of race, color, national origin, age, disability, sex, sexual orientation, or gender identity.            Thank you!     Thank you for choosing [facility]  for your care. Our goal is always to provide you with excellent care. Hearing back from our patients is one way we can continue to improve our services. Please take a few minutes to complete the written survey that you may receive in the mail after your visit with us. Thank you!             Your Updated Medication List - Protect others around you: Learn how to safely use, store and throw away your medicines at www.disposemymeds.org.          This list is accurate as of: 1/7/18 12:33 PM.  Always use your most recent med list.                   Brand Name Dispense Instructions for use Diagnosis    atorvastatin 10 MG tablet    LIPITOR    90 tablet    Take 1 tablet (10 " mg) by mouth daily    Mixed hyperlipidemia       calcium acetate 667 MG Caps capsule    PHOSLO    270 capsule    Take 3 capsules (2,001 mg) by mouth 3 times daily (with meals)    Hyperphosphatemia       calcium carbonate 500 MG chewable tablet    TUMS     Take 1 chew tab by mouth 3 times daily Dose is 750mg    CKD (chronic kidney disease) stage 5, GFR less than 15 ml/min (H)       DIALYVITE 800 0.8 MG Tabs     90 tablet    Take 1 tablet by mouth daily    ESRD (end stage renal disease) on dialysis (H)       diltiazem 180 MG 24 hr capsule    DILT-XR    90 capsule    Take 1 capsule (180 mg) by mouth daily    Hypertension secondary to other renal disorders       fish oil-omega-3 fatty acids 1000 MG capsule      Take 1 capsule by mouth daily.        gentamicin 0.1 % cream    GARAMYCIN    30 g    Apply topically daily    Peritoneal dialysis catheter dysfunction, subsequent encounter (H)       lisinopril 20 MG tablet    PRINIVIL/ZESTRIL    90 tablet    Take 1 tablet (20 mg) by mouth daily    Secondary hypertension due to renal disease       MELATONIN PO      Take 2 mg by mouth At Bedtime        ondansetron 4 MG tablet    ZOFRAN    18 tablet    Take 1 tablet (4 mg) by mouth every 8 hours as needed for nausea    ESRD on peritoneal dialysis (H)       oxyCODONE IR 5 MG tablet    ROXICODONE    18 tablet    Take 1 tablet (5 mg) by mouth every 6 hours as needed for pain maximum 5 tablet(s) per day    ESRD on peritoneal dialysis (H)       senna 8.6 MG tablet    SENOKOT    100 tablet    Take 1 tablet by mouth daily    ESRD on peritoneal dialysis (H)       sevelamer 800 MG tablet    RENAGEL    90 tablet    Take 1 tablet (800 mg) by mouth 3 times daily (with meals)    Hyperphosphatemia       sodium bicarbonate 650 MG tablet     360 tablet    Take 2 tablets (1,300 mg) by mouth 2 times daily    Acidosis

## 2018-01-07 NOTE — NURSING NOTE
"Chief Complaint   Patient presents with     Urgent Care     remove catheter     Has procedure for kidney failure and had to have catheter put in. Needs it removed.        Initial /72  Pulse 86  Temp 97.9  F (36.6  C) (Oral)  Resp 14  Ht 6' 3\" (1.905 m)  Wt 210 lb (95.3 kg)  SpO2 100%  BMI 26.25 kg/m2 Estimated body mass index is 26.25 kg/(m^2) as calculated from the following:    Height as of this encounter: 6' 3\" (1.905 m).    Weight as of this encounter: 210 lb (95.3 kg).  Medication Reconciliation: complete  "

## 2018-01-07 NOTE — PROGRESS NOTES
Subjective: 2 days ago patient had a catheter put in after surgery because in the past he has had trouble with urination.  They told him to have it removed in 2 days.    Objective: Steven catheter was removed after deflating the balloon of 10 cc of fluid and then gently pulling it out.  There was a little bit of dried brown mucus around the opening.    Assessment and plan: Catheter removal.  Watch for infection or inability to urinate.

## 2018-01-08 ENCOUNTER — HOSPITAL ENCOUNTER (INPATIENT)
Facility: CLINIC | Age: 53
Setting detail: SURGERY ADMIT
End: 2018-01-08
Attending: TRANSPLANT SURGERY | Admitting: TRANSPLANT SURGERY
Payer: COMMERCIAL

## 2018-01-08 ENCOUNTER — HOSPITAL ENCOUNTER (INPATIENT)
Facility: CLINIC | Age: 53
LOS: 2 days | Discharge: HOME | End: 2018-01-10
Attending: EMERGENCY MEDICINE | Admitting: TRANSPLANT SURGERY
Payer: COMMERCIAL

## 2018-01-08 ENCOUNTER — APPOINTMENT (OUTPATIENT)
Dept: CT IMAGING | Facility: CLINIC | Age: 53
End: 2018-01-08
Attending: EMERGENCY MEDICINE
Payer: COMMERCIAL

## 2018-01-08 DIAGNOSIS — Z99.2 ESRD ON PERITONEAL DIALYSIS (H): ICD-10-CM

## 2018-01-08 DIAGNOSIS — N18.6 ESRD ON PERITONEAL DIALYSIS (H): ICD-10-CM

## 2018-01-08 DIAGNOSIS — E87.5 HYPERKALEMIA: ICD-10-CM

## 2018-01-08 DIAGNOSIS — K35.30 ACUTE APPENDICITIS WITH LOCALIZED PERITONITIS: ICD-10-CM

## 2018-01-08 DIAGNOSIS — N18.5 CKD (CHRONIC KIDNEY DISEASE) STAGE 5, GFR LESS THAN 15 ML/MIN (H): Primary | ICD-10-CM

## 2018-01-08 PROBLEM — K37 APPENDICITIS: Status: ACTIVE | Noted: 2018-01-08

## 2018-01-08 LAB
ALBUMIN UR-MCNC: 10 MG/DL
ANION GAP SERPL CALCULATED.3IONS-SCNC: 14 MMOL/L (ref 3–14)
APPEARANCE UR: CLEAR
APTT PPP: 25 SEC (ref 22–37)
BACTERIA #/AREA URNS HPF: ABNORMAL /HPF
BASOPHILS # BLD AUTO: 0 10E9/L (ref 0–0.2)
BASOPHILS NFR BLD AUTO: 0.2 %
BILIRUB UR QL STRIP: NEGATIVE
BUN SERPL-MCNC: 125 MG/DL (ref 7–30)
CALCIUM SERPL-MCNC: 9.1 MG/DL (ref 8.5–10.1)
CHLORIDE SERPL-SCNC: 111 MMOL/L (ref 94–109)
CO2 SERPL-SCNC: 16 MMOL/L (ref 20–32)
COLOR UR AUTO: ABNORMAL
CREAT SERPL-MCNC: 11.6 MG/DL (ref 0.66–1.25)
DIFFERENTIAL METHOD BLD: ABNORMAL
EOSINOPHIL # BLD AUTO: 0.2 10E9/L (ref 0–0.7)
EOSINOPHIL NFR BLD AUTO: 3.6 %
ERYTHROCYTE [DISTWIDTH] IN BLOOD BY AUTOMATED COUNT: 14.6 % (ref 10–15)
GFR SERPL CREATININE-BSD FRML MDRD: 5 ML/MIN/1.7M2
GLUCOSE BLDC GLUCOMTR-MCNC: 133 MG/DL (ref 70–99)
GLUCOSE BLDC GLUCOMTR-MCNC: 152 MG/DL (ref 70–99)
GLUCOSE BLDC GLUCOMTR-MCNC: 165 MG/DL (ref 70–99)
GLUCOSE BLDC GLUCOMTR-MCNC: 45 MG/DL (ref 70–99)
GLUCOSE SERPL-MCNC: 84 MG/DL (ref 70–99)
GLUCOSE UR STRIP-MCNC: 30 MG/DL
HCT VFR BLD AUTO: 29.2 % (ref 40–53)
HGB BLD-MCNC: 9.4 G/DL (ref 13.3–17.7)
HGB UR QL STRIP: ABNORMAL
IMM GRANULOCYTES # BLD: 0 10E9/L (ref 0–0.4)
IMM GRANULOCYTES NFR BLD: 0.3 %
INR PPP: 0.94 (ref 0.86–1.14)
KETONES UR STRIP-MCNC: NEGATIVE MG/DL
LACTATE BLD-SCNC: 0.7 MMOL/L (ref 0.7–2)
LEUKOCYTE ESTERASE UR QL STRIP: NEGATIVE
LIPASE SERPL-CCNC: 566 U/L (ref 73–393)
LYMPHOCYTES # BLD AUTO: 1.7 10E9/L (ref 0.8–5.3)
LYMPHOCYTES NFR BLD AUTO: 24.8 %
MCH RBC QN AUTO: 30.4 PG (ref 26.5–33)
MCHC RBC AUTO-ENTMCNC: 32.2 G/DL (ref 31.5–36.5)
MCV RBC AUTO: 95 FL (ref 78–100)
MONOCYTES # BLD AUTO: 0.4 10E9/L (ref 0–1.3)
MONOCYTES NFR BLD AUTO: 6.3 %
NEUTROPHILS # BLD AUTO: 4.3 10E9/L (ref 1.6–8.3)
NEUTROPHILS NFR BLD AUTO: 64.8 %
NITRATE UR QL: NEGATIVE
NRBC # BLD AUTO: 0 10*3/UL
NRBC BLD AUTO-RTO: 0 /100
PH UR STRIP: 6 PH (ref 5–7)
PLATELET # BLD AUTO: 218 10E9/L (ref 150–450)
POTASSIUM SERPL-SCNC: 4.7 MMOL/L (ref 3.4–5.3)
POTASSIUM SERPL-SCNC: 5.8 MMOL/L (ref 3.4–5.3)
POTASSIUM SERPL-SCNC: 5.9 MMOL/L (ref 3.4–5.3)
RADIOLOGIST FLAGS: ABNORMAL
RADIOLOGIST FLAGS: ABNORMAL
RBC # BLD AUTO: 3.09 10E12/L (ref 4.4–5.9)
RBC #/AREA URNS AUTO: 0 /HPF (ref 0–2)
SODIUM SERPL-SCNC: 141 MMOL/L (ref 133–144)
SOURCE: ABNORMAL
SP GR UR STRIP: 1.01 (ref 1–1.03)
UROBILINOGEN UR STRIP-MCNC: NORMAL MG/DL (ref 0–2)
WBC # BLD AUTO: 6.6 10E9/L (ref 4–11)
WBC #/AREA URNS AUTO: <1 /HPF (ref 0–2)

## 2018-01-08 PROCEDURE — 85730 THROMBOPLASTIN TIME PARTIAL: CPT | Performed by: EMERGENCY MEDICINE

## 2018-01-08 PROCEDURE — 25800025 ZZH RX 258: Performed by: EMERGENCY MEDICINE

## 2018-01-08 PROCEDURE — 96375 TX/PRO/DX INJ NEW DRUG ADDON: CPT | Performed by: EMERGENCY MEDICINE

## 2018-01-08 PROCEDURE — 99285 EMERGENCY DEPT VISIT HI MDM: CPT | Mod: 25 | Performed by: EMERGENCY MEDICINE

## 2018-01-08 PROCEDURE — 83605 ASSAY OF LACTIC ACID: CPT | Performed by: EMERGENCY MEDICINE

## 2018-01-08 PROCEDURE — 83690 ASSAY OF LIPASE: CPT | Performed by: EMERGENCY MEDICINE

## 2018-01-08 PROCEDURE — 00000146 ZZHCL STATISTIC GLUCOSE BY METER IP

## 2018-01-08 PROCEDURE — 85610 PROTHROMBIN TIME: CPT | Performed by: EMERGENCY MEDICINE

## 2018-01-08 PROCEDURE — 74176 CT ABD & PELVIS W/O CONTRAST: CPT

## 2018-01-08 PROCEDURE — 21400006 ZZH R&B CCU INTERMEDIATE UMMC

## 2018-01-08 PROCEDURE — 81001 URINALYSIS AUTO W/SCOPE: CPT | Performed by: EMERGENCY MEDICINE

## 2018-01-08 PROCEDURE — 96374 THER/PROPH/DIAG INJ IV PUSH: CPT | Performed by: EMERGENCY MEDICINE

## 2018-01-08 PROCEDURE — 36415 COLL VENOUS BLD VENIPUNCTURE: CPT | Performed by: EMERGENCY MEDICINE

## 2018-01-08 PROCEDURE — 99291 CRITICAL CARE FIRST HOUR: CPT | Mod: 25 | Performed by: EMERGENCY MEDICINE

## 2018-01-08 PROCEDURE — 27210995 ZZH RX 272: Performed by: EMERGENCY MEDICINE

## 2018-01-08 PROCEDURE — 93010 ELECTROCARDIOGRAM REPORT: CPT | Mod: Z6 | Performed by: EMERGENCY MEDICINE

## 2018-01-08 PROCEDURE — 93005 ELECTROCARDIOGRAM TRACING: CPT | Performed by: EMERGENCY MEDICINE

## 2018-01-08 PROCEDURE — 80048 BASIC METABOLIC PNL TOTAL CA: CPT | Performed by: EMERGENCY MEDICINE

## 2018-01-08 PROCEDURE — 25000132 ZZH RX MED GY IP 250 OP 250 PS 637: Performed by: EMERGENCY MEDICINE

## 2018-01-08 PROCEDURE — 96361 HYDRATE IV INFUSION ADD-ON: CPT | Performed by: EMERGENCY MEDICINE

## 2018-01-08 PROCEDURE — 25000128 H RX IP 250 OP 636: Performed by: EMERGENCY MEDICINE

## 2018-01-08 PROCEDURE — 84132 ASSAY OF SERUM POTASSIUM: CPT | Performed by: EMERGENCY MEDICINE

## 2018-01-08 PROCEDURE — 85025 COMPLETE CBC W/AUTO DIFF WBC: CPT | Performed by: EMERGENCY MEDICINE

## 2018-01-08 RX ORDER — ONDANSETRON 2 MG/ML
4 INJECTION INTRAMUSCULAR; INTRAVENOUS EVERY 6 HOURS PRN
Status: DISCONTINUED | OUTPATIENT
Start: 2018-01-08 | End: 2018-01-10 | Stop reason: HOSPADM

## 2018-01-08 RX ORDER — HYDROMORPHONE HCL/0.9% NACL/PF 0.2MG/0.2
0.2 SYRINGE (ML) INTRAVENOUS
Status: DISCONTINUED | OUTPATIENT
Start: 2018-01-08 | End: 2018-01-10 | Stop reason: HOSPADM

## 2018-01-08 RX ORDER — ONDANSETRON 4 MG/1
4 TABLET, ORALLY DISINTEGRATING ORAL EVERY 6 HOURS PRN
Status: DISCONTINUED | OUTPATIENT
Start: 2018-01-08 | End: 2018-01-10 | Stop reason: HOSPADM

## 2018-01-08 RX ORDER — DEXTROSE MONOHYDRATE 25 G/50ML
25 INJECTION, SOLUTION INTRAVENOUS ONCE
Status: COMPLETED | OUTPATIENT
Start: 2018-01-08 | End: 2018-01-08

## 2018-01-08 RX ORDER — NALOXONE HYDROCHLORIDE 0.4 MG/ML
.1-.4 INJECTION, SOLUTION INTRAMUSCULAR; INTRAVENOUS; SUBCUTANEOUS
Status: DISCONTINUED | OUTPATIENT
Start: 2018-01-08 | End: 2018-01-10 | Stop reason: HOSPADM

## 2018-01-08 RX ORDER — DEXTROSE MONOHYDRATE 100 MG/ML
INJECTION, SOLUTION INTRAVENOUS CONTINUOUS
Status: DISPENSED | OUTPATIENT
Start: 2018-01-08 | End: 2018-01-08

## 2018-01-08 RX ORDER — FUROSEMIDE 10 MG/ML
40 INJECTION INTRAMUSCULAR; INTRAVENOUS ONCE
Status: COMPLETED | OUTPATIENT
Start: 2018-01-08 | End: 2018-01-08

## 2018-01-08 RX ORDER — SODIUM POLYSTYRENE SULFONATE 15 G/60ML
30 SUSPENSION ORAL; RECTAL ONCE
Status: COMPLETED | OUTPATIENT
Start: 2018-01-08 | End: 2018-01-08

## 2018-01-08 RX ORDER — ACETAMINOPHEN 325 MG/1
650 TABLET ORAL EVERY 4 HOURS PRN
Status: DISCONTINUED | OUTPATIENT
Start: 2018-01-08 | End: 2018-01-10 | Stop reason: HOSPADM

## 2018-01-08 RX ORDER — LIDOCAINE 40 MG/G
CREAM TOPICAL
Status: DISCONTINUED | OUTPATIENT
Start: 2018-01-08 | End: 2018-01-09

## 2018-01-08 RX ADMIN — DEXTROSE MONOHYDRATE 25 G: 500 INJECTION PARENTERAL at 20:20

## 2018-01-08 RX ADMIN — HUMAN INSULIN 9.5 UNITS: 100 INJECTION, SOLUTION SUBCUTANEOUS at 20:27

## 2018-01-08 RX ADMIN — FUROSEMIDE 40 MG: 10 INJECTION, SOLUTION INTRAVENOUS at 20:29

## 2018-01-08 RX ADMIN — SODIUM BICARBONATE 50 MEQ: 84 INJECTION, SOLUTION INTRAVENOUS at 20:30

## 2018-01-08 RX ADMIN — DEXTROSE MONOHYDRATE: 100 INJECTION, SOLUTION INTRAVENOUS at 20:40

## 2018-01-08 RX ADMIN — WATER 1 G: 1 INJECTION INTRAMUSCULAR; INTRAVENOUS; SUBCUTANEOUS at 18:06

## 2018-01-08 RX ADMIN — SODIUM POLYSTYRENE SULFONATE 30 G: 15 SUSPENSION ORAL; RECTAL at 20:28

## 2018-01-08 ASSESSMENT — ENCOUNTER SYMPTOMS
FEVER: 0
NAUSEA: 0
ABDOMINAL PAIN: 1
VOMITING: 0
DIFFICULTY URINATING: 0
COUGH: 0
SHORTNESS OF BREATH: 0

## 2018-01-08 NOTE — IP AVS SNAPSHOT
MRN:[id number]                      After Visit Summary   1/8/2018    Cesar Villalobos    MRN: [id number]           Thank you!     Thank you for choosing [facility] for your care. Our goal is always to provide you with excellent care. Hearing back from our patients is one way we can continue to improve our services. Please take a few minutes to complete the written survey that you may receive in the mail after you visit with us. Thank you!        Patient Information     Date Of Birth          1965        Designated Caregiver       Most Recent Value    Caregiver    Will someone help with your care after discharge? no      About your hospital stay     You were admitted on:  January 8, 2018 You last received care in the:  [address] [facility]    You were discharged on:  January 10, 2018        Reason for your hospital stay       Appendectomy, start temporary hemodialysis                  Who to Call     For medical emergencies, please call 911.  For non-urgent questions about your medical care, please call your primary care provider or clinic, [phone]  For questions related to your surgery, please call your surgery clinic        Attending Provider     Provider Linda Talbert MD Emergency Medicine    Janene Justice MD Emergency Medicine    Maggi, Caden Quiñonez MD Transplant       Primary Care Provider Office Phone # Fax #    Sallie Olsen [phone]       When to contact your care team       Notify Transplant Clinic if you develop redness or drainage from incisions or around PD cath.  Call [phone] and ask to have to Donor Transplant Surgery fellow paged.  For all other issues, call your Nephrologist.                  After Care Instructions     Activity       Your activity upon discharge: No lifting greater than 10 pounds for at least 6 weeks, no driving while taking narcotic pain medications.            Diet       Follow this diet upon  discharge: Resume your normal diet            Discharge Instructions       HOLD your blood pressure medications (lisinopril, diltiazem).  Your blood pressure will be re-evaluated in dialysis and they will tell you when to restart them.            Wound care and dressings       Instructions to care for your wound at home: Wash incisions daily with soap and water.                  Follow-up Appointments     Adult [facility] Follow-up and recommended labs and tests       Dr. Tay, Transplant Clinic, 2 weeks, follow up appendectomy and PD cath reposition    Appointments on [place] and/or [facility] (with [facility] or [facility] provider or service). Call [phone] if you haven't heard regarding these appointments within 7 days of discharge.            Follow Up and recommended labs and tests       Ultrasound of liver to evaluate hypodensities noted on CT scan.  Please talk with your primary care physician to schedule.            Follow Up and recommended labs and tests       [facility]  [address] [place] [address]  Ph: [phone]    Fax: [phone]    Nephrologist: Dr Merline Muñiz  Days: T-Th-Sat  Time: 1:45 PM--  Start: Thursday 1/11/18---Please arrive by 1:20pm to do paperwork                  Your next 10 appointments already scheduled     Jan 22, 2018  3:45 PM CST   (Arrive by 3:30 PM)   Post-Op with Caden Tay MD   [facility] Transplant ([facility])    [address] [place] [address]  [address] [place] [address] [phone]              Further instructions from your care team       I have fax'd d/c orders and summary to [facility] 1/10/18 1544    Pending Results     Date and Time Order Name Status Description    1/9/2018 1331 Surgical pathology exam In process     1/9/2018 1247 Fungus Culture, non-blood Preliminary     1/9/2018 1247 Fluid Culture Aerobic Bacterial Preliminary     1/9/2018 1247 Anaerobic bacterial  "culture Preliminary             Statement of Approval     Ordered          01/10/18 1661  I have reviewed and agree with all the recommendations and orders detailed in this document.  EFFECTIVE NOW     Approved and electronically signed by:  Margo Elam APRN CNP             Admission Information     Date & Time Provider Department Dept. Phone    1/8/2018 Caden Tay MD [address] [phone]      Your Vitals Were     Blood Pressure Pulse Temperature Respirations Height Weight    118/83 72 98.4  F (36.9  C) (Oral) 16 1.88 m (6' 2\") 92.4 kg (203 lb 11.3 oz)    Pulse Oximetry BMI (Body Mass Index)                98% 26.15 kg/m2          MyChart Information     [email or web address] gives you secure access to your electronic health record. If you see a primary care provider, you can also send messages to your care team and make appointments. If you have questions, please call your primary care clinic.  If you do not have a primary care provider, please call [phone] and they will assist you.        Care EveryWhere ID     This is your Care EveryWhere ID. This could be used by other organizations to access your [place] medical records  [id number]        Equal Access to Services     COLLEEN DEWEY AH: Hadii carolynn Flores, luciano ferreira, qaybta kaalmatino burris, chandrakant carrasco. So [facility] [phone].    ATENCIÓN: Si habla español, tiene a coe disposición servicios gratuitos de asistencia lingüística. Temi al [phone].    We comply with applicable federal civil rights laws and [place] laws. We do not discriminate on the basis of race, color, national origin, age, disability, sex, sexual orientation, or gender identity.               Review of your medicines      START taking        Dose / Directions    acetaminophen 325 MG tablet   Commonly known as:  TYLENOL        Dose:  650 mg   Take 2 tablets (650 mg) by mouth every 4 hours as needed for mild pain "   Refills:  0       levofloxacin 250 MG tablet   Commonly known as:  LEVAQUIN        Dose:  250 mg   Take 1 tablet (250 mg) by mouth every 48 hours   Quantity:  2 tablet   Refills:  0         CONTINUE these medicines which may have CHANGED, or have new prescriptions. If we are uncertain of the size of tablets/capsules you have at home, strength may be listed as something that might have changed.        Dose / Directions    oxyCODONE IR 5 MG tablet   Commonly known as:  ROXICODONE   This may have changed:  additional instructions   Used for:  ESRD on peritoneal dialysis (H)        Dose:  5 mg   Take 1 tablet (5 mg) by mouth every 6 hours as needed for pain   Quantity:  15 tablet   Refills:  0         CONTINUE these medicines which have NOT CHANGED        Dose / Directions    atorvastatin 10 MG tablet   Commonly known as:  LIPITOR   Used for:  Mixed hyperlipidemia        Dose:  10 mg   Take 1 tablet (10 mg) by mouth daily   Quantity:  90 tablet   Refills:  3       calcium acetate 667 MG Caps capsule   Commonly known as:  PHOSLO   Used for:  Hyperphosphatemia        Dose:  2001 mg   Take 3 capsules (2,001 mg) by mouth 3 times daily (with meals)   Quantity:  270 capsule   Refills:  11       calcium carbonate 500 MG chewable tablet   Commonly known as:  TUMS   Used for:  CKD (chronic kidney disease) stage 5, GFR less than 15 ml/min (H)        Dose:  1 chew tab   Take 1 chew tab by mouth 3 times daily Dose is 750mg   Refills:  0       DIALYVITE 800 0.8 MG Tabs   Used for:  ESRD (end stage renal disease) on dialysis (H)        Dose:  1 tablet   Take 1 tablet by mouth daily   Quantity:  90 tablet   Refills:  3       fish oil-omega-3 fatty acids 1000 MG capsule        Dose:  1 capsule   Take 1 capsule by mouth daily.   Refills:  0       gentamicin 0.1 % cream   Commonly known as:  GARAMYCIN   Used for:  Peritoneal dialysis catheter dysfunction, subsequent encounter (H)        Apply topically daily   Quantity:  30 g    Refills:  11       MELATONIN PO        Dose:  2 mg   Take 2 mg by mouth At Bedtime   Refills:  0       ondansetron 4 MG tablet   Commonly known as:  ZOFRAN   Used for:  ESRD on peritoneal dialysis (H)        Dose:  4 mg   Take 1 tablet (4 mg) by mouth every 8 hours as needed for nausea   Quantity:  18 tablet   Refills:  0       senna 8.6 MG tablet   Commonly known as:  SENOKOT   Used for:  ESRD on peritoneal dialysis (H)        Dose:  1 tablet   Take 1 tablet by mouth daily   Quantity:  100 tablet   Refills:  0       sevelamer 800 MG tablet   Commonly known as:  RENAGEL   Used for:  Hyperphosphatemia        Dose:  800 mg   Take 1 tablet (800 mg) by mouth 3 times daily (with meals)   Quantity:  90 tablet   Refills:  11       sodium bicarbonate 650 MG tablet   Used for:  Acidosis        Dose:  1300 mg   Take 2 tablets (1,300 mg) by mouth 2 times daily   Quantity:  360 tablet   Refills:  3         STOP taking     diltiazem 180 MG 24 hr capsule   Commonly known as:  DILT-XR           lisinopril 20 MG tablet   Commonly known as:  PRINIVIL/ZESTRIL                Where to get your medicines      These medications were sent to [facility] [place], [place] - [address] [address]     Phone:  [phone]     levofloxacin 250 MG tablet         Some of these will need a paper prescription and others can be bought over the counter. Ask your nurse if you have questions.     Bring a paper prescription for each of these medications     oxyCODONE IR 5 MG tablet       You don't need a prescription for these medications     acetaminophen 325 MG tablet               ANTIBIOTIC INSTRUCTION     You've Been Prescribed an Antibiotic - Now What?  Your healthcare team thinks that you or your loved one might have an infection. Some infections can be treated with antibiotics, which are powerful, life-saving drugs. Like all medications, antibiotics have side effects and should only  be used when necessary. There are some important things you should know about your antibiotic treatment.      Your healthcare team may run tests before you start taking an antibiotic.    Your team may take samples (e.g., from your blood, urine or other areas) to run tests to look for bacteria. These test can be important to determine if you need an antibiotic at all and, if you do, which antibiotic will work best.      Within a few days, your healthcare team might change or even stop your antibiotic.    Your team may start you on an antibiotic while they are working to find out what is making you sick.    Your team might change your antibiotic because test results show that a different antibiotic would be better to treat your infection.    In some cases, once your team has more information, they learn that you do not need an antibiotic at all. They may find out that you don't have an infection, or that the antibiotic you're taking won't work against your infection. For example, an infection caused by a virus can't be treated with antibiotics. Staying on an antibiotic when you don't need it is more likely to be harmful than helpful.      You may experience side effects from your antibiotic.    Like all medications, antibiotics have side effects. Some of these can be serious.    Let you healthcare team know if you have any known allergies when you are admitted to the hospital.    One significant side effect of nearly all antibiotics is the risk of severe and sometimes deadly diarrhea caused by Clostridium difficile (C. Difficile). This occurs when a person takes antibiotics because some good germs are destroyed. Antibiotic use allows C. diificile to take over, putting patients at high risk for this serious infection.    As a patient or caregiver, it is important to understand your or your loved one's antibiotic treatment. It is especially important for caregivers to speak up when patients can't speak for themselves.  Here are some important questions to ask your healthcare team.    What infection is this antibiotic treating and how do you know I have that infection?    What side effects might occur from this antibiotic?    How long will I need to take this antibiotic?    Is it safe to take this antibiotic with other medications or supplements (e.g., vitamins) that I am taking?     Are there any special directions I need to know about taking this antibiotic? For example, should I take it with food?    How will I be monitored to know whether my infection is responding to the antibiotic?    What tests may help to make sure the right antibiotic is prescribed for me?      Information provided by:  www.cdc.gov/getsmart  U.S. Department of Health and Human Services  Centers for disease Control and Prevention  National Center for Emerging and Zoonotic Infectious Diseases  Division of Healthcare Quality Promotion         Protect others around you: Learn how to safely use, store and throw away your medicines at www.disposemymeds.org.             Medication List: This is a list of all your medications and when to take them. Check marks below indicate your daily home schedule. Keep this list as a reference.      Medications           Morning Afternoon Evening Bedtime As Needed    acetaminophen 325 MG tablet   Commonly known as:  TYLENOL   Take 2 tablets (650 mg) by mouth every 4 hours as needed for mild pain   Last time this was given:  650 mg on 1/10/2018  7:58 AM                                atorvastatin 10 MG tablet   Commonly known as:  LIPITOR   Take 1 tablet (10 mg) by mouth daily   Last time this was given:  10 mg on 1/10/2018  7:59 AM                                calcium acetate 667 MG Caps capsule   Commonly known as:  PHOSLO   Take 3 capsules (2,001 mg) by mouth 3 times daily (with meals)   Last time this was given:  2,001 mg on 1/10/2018 11:31 AM                                calcium carbonate 500 MG chewable tablet    Commonly known as:  TUMS   Take 1 chew tab by mouth 3 times daily Dose is 750mg                                DIALYVITE 800 0.8 MG Tabs   Take 1 tablet by mouth daily   Last time this was given:  1 tablet on 1/10/2018  7:58 AM                                fish oil-omega-3 fatty acids 1000 MG capsule   Take 1 capsule by mouth daily.   Last time this was given:  1 capsule on 1/10/2018  7:59 AM                                gentamicin 0.1 % cream   Commonly known as:  GARAMYCIN   Apply topically daily   Last time this was given:  1/10/2018  7:59 AM                                levofloxacin 250 MG tablet   Commonly known as:  LEVAQUIN   Take 1 tablet (250 mg) by mouth every 48 hours                                MELATONIN PO   Take 2 mg by mouth At Bedtime                                ondansetron 4 MG tablet   Commonly known as:  ZOFRAN   Take 1 tablet (4 mg) by mouth every 8 hours as needed for nausea                                oxyCODONE IR 5 MG tablet   Commonly known as:  ROXICODONE   Take 1 tablet (5 mg) by mouth every 6 hours as needed for pain   Last time this was given:  5 mg on 1/10/2018  5:04 AM                                senna 8.6 MG tablet   Commonly known as:  SENOKOT   Take 1 tablet by mouth daily   Last time this was given:  1 tablet on 1/10/2018  7:58 AM                                sevelamer 800 MG tablet   Commonly known as:  RENAGEL   Take 1 tablet (800 mg) by mouth 3 times daily (with meals)                                sodium bicarbonate 650 MG tablet   Take 2 tablets (1,300 mg) by mouth 2 times daily   Last time this was given:  1,300 mg on 1/10/2018  7:58 AM

## 2018-01-08 NOTE — ED NOTES
peritoneal dialysis catheter is causing extreme pain, it was relocated Friday am, he has been unable to dialyze since then due to pain

## 2018-01-08 NOTE — IP AVS SNAPSHOT
[address] [address]    Phone:  [phone]                                       After Visit Summary   1/8/2018    Cesar Villalobos    MRN: [id number]           After Visit Summary Signature Page     I have received my discharge instructions, and my questions have been answered. I have discussed any challenges I see with this plan with the nurse or doctor.    ..........................................................................................................................................  Patient/Patient Representative Signature      ..........................................................................................................................................  Patient Representative Print Name and Relationship to Patient    ..................................................               ................................................  Date                                            Time    ..........................................................................................................................................  Reviewed by Signature/Title    ...................................................              ..............................................  Date                                                            Time

## 2018-01-08 NOTE — LETTER
Transition Communication Hand-off for Care Transitions to Next Level of Care Provider    Name: Cesar Villalobos  MRN #: [id number]  Primary Care Provider: Sallie Olsen     Primary Clinic: [address] [place] [address]     Reason for Hospitalization:  Hyperkalemia [E87.5]  Acute appendicitis with localized peritonitis [K35.3]  S/P laparoscopic appendectomy [Z90.49]  Admit Date/Time: 1/8/2018  2:43 PM  Discharge Date: 1.10.18  Payor Source: Payor: BCBS / Plan: [employer] [place] / Product Type: Indemnity /              Reason for Communication Hand-off Referral: Other 1/9 lap appy--PD catheter coiled around appendix    Discharge Plan: OP HD       Concern for non-adherence with plan of care:   Y/N N  Discharge Needs Assessment:        Follow-up specialty is recommended: Yes    Follow-up plan:  Future Appointments  Date Time Provider Department Center   1/22/2018 3:45 PM Caden Tay MD [facility]       Any outstanding tests or procedures:              Key Recommendations:      Lola Mckeon    AVS/Discharge Summary is the source of truth; this is a helpful guide for improved communication of patient story

## 2018-01-09 ENCOUNTER — ANESTHESIA EVENT (OUTPATIENT)
Dept: SURGERY | Facility: CLINIC | Age: 53
End: 2018-01-09
Payer: COMMERCIAL

## 2018-01-09 ENCOUNTER — ANESTHESIA (OUTPATIENT)
Dept: SURGERY | Facility: CLINIC | Age: 53
End: 2018-01-09
Payer: COMMERCIAL

## 2018-01-09 ENCOUNTER — APPOINTMENT (OUTPATIENT)
Dept: INTERVENTIONAL RADIOLOGY/VASCULAR | Facility: CLINIC | Age: 53
End: 2018-01-09
Attending: NURSE PRACTITIONER
Payer: COMMERCIAL

## 2018-01-09 PROBLEM — E87.5 HYPERKALEMIA: Status: ACTIVE | Noted: 2018-01-09

## 2018-01-09 LAB
ABO + RH BLD: NORMAL
ABO + RH BLD: NORMAL
ANION GAP SERPL CALCULATED.3IONS-SCNC: 12 MMOL/L (ref 3–14)
BLD GP AB SCN SERPL QL: NORMAL
BLOOD BANK CMNT PATIENT-IMP: NORMAL
BUN SERPL-MCNC: 126 MG/DL (ref 7–30)
CALCIUM SERPL-MCNC: 8.8 MG/DL (ref 8.5–10.1)
CHLORIDE SERPL-SCNC: 108 MMOL/L (ref 94–109)
CO2 SERPL-SCNC: 20 MMOL/L (ref 20–32)
CREAT SERPL-MCNC: 12.1 MG/DL (ref 0.66–1.25)
GFR SERPL CREATININE-BSD FRML MDRD: 4 ML/MIN/1.7M2
GLUCOSE BLDC GLUCOMTR-MCNC: 115 MG/DL (ref 70–99)
GLUCOSE BLDC GLUCOMTR-MCNC: 96 MG/DL (ref 70–99)
GLUCOSE SERPL-MCNC: 91 MG/DL (ref 70–99)
GRAM STN SPEC: NORMAL
HBV SURFACE AB SERPL IA-ACNC: 15 M[IU]/ML
HBV SURFACE AG SERPL QL IA: NONREACTIVE
INTERPRETATION ECG - MUSE: NORMAL
POTASSIUM SERPL-SCNC: 4.1 MMOL/L (ref 3.4–5.3)
POTASSIUM SERPL-SCNC: 5.2 MMOL/L (ref 3.4–5.3)
POTASSIUM SERPL-SCNC: 5.4 MMOL/L (ref 3.4–5.3)
SODIUM SERPL-SCNC: 141 MMOL/L (ref 133–144)
SPECIMEN EXP DATE BLD: NORMAL
SPECIMEN SOURCE: NORMAL

## 2018-01-09 PROCEDURE — 5A1D70Z PERFORMANCE OF URINARY FILTRATION, INTERMITTENT, LESS THAN 6 HOURS PER DAY: ICD-10-PCS | Performed by: TRANSPLANT SURGERY

## 2018-01-09 PROCEDURE — 84132 ASSAY OF SERUM POTASSIUM: CPT | Performed by: TRANSPLANT SURGERY

## 2018-01-09 PROCEDURE — 36000059 ZZH SURGERY LEVEL 3 EA 15 ADDTL MIN UMMC: Performed by: TRANSPLANT SURGERY

## 2018-01-09 PROCEDURE — 25000128 H RX IP 250 OP 636: Performed by: TRANSPLANT SURGERY

## 2018-01-09 PROCEDURE — 25000132 ZZH RX MED GY IP 250 OP 250 PS 637: Performed by: STUDENT IN AN ORGANIZED HEALTH CARE EDUCATION/TRAINING PROGRAM

## 2018-01-09 PROCEDURE — 25000128 H RX IP 250 OP 636: Performed by: ANESTHESIOLOGY

## 2018-01-09 PROCEDURE — 36415 COLL VENOUS BLD VENIPUNCTURE: CPT | Performed by: TRANSPLANT SURGERY

## 2018-01-09 PROCEDURE — C1894 INTRO/SHEATH, NON-LASER: HCPCS | Performed by: TRANSPLANT SURGERY

## 2018-01-09 PROCEDURE — 27211024 ZZHC OR SUPPLY OTHER OPNP: Performed by: TRANSPLANT SURGERY

## 2018-01-09 PROCEDURE — 0JH60XZ INSERTION OF TUNNELED VASCULAR ACCESS DEVICE INTO CHEST SUBCUTANEOUS TISSUE AND FASCIA, OPEN APPROACH: ICD-10-PCS | Performed by: PHYSICIAN ASSISTANT

## 2018-01-09 PROCEDURE — 87102 FUNGUS ISOLATION CULTURE: CPT | Performed by: STUDENT IN AN ORGANIZED HEALTH CARE EDUCATION/TRAINING PROGRAM

## 2018-01-09 PROCEDURE — 00000146 ZZHCL STATISTIC GLUCOSE BY METER IP

## 2018-01-09 PROCEDURE — C1769 GUIDE WIRE: HCPCS | Performed by: TRANSPLANT SURGERY

## 2018-01-09 PROCEDURE — 25000566 ZZH SEVOFLURANE, EA 15 MIN: Performed by: TRANSPLANT SURGERY

## 2018-01-09 PROCEDURE — 37000008 ZZH ANESTHESIA TECHNICAL FEE, 1ST 30 MIN: Performed by: TRANSPLANT SURGERY

## 2018-01-09 PROCEDURE — 40000003 IR CVC TUNNEL PLACEMENT > 5 YRS OF AGE: Mod: LT

## 2018-01-09 PROCEDURE — 90937 HEMODIALYSIS REPEATED EVAL: CPT

## 2018-01-09 PROCEDURE — 71000014 ZZH RECOVERY PHASE 1 LEVEL 2 FIRST HR: Performed by: TRANSPLANT SURGERY

## 2018-01-09 PROCEDURE — 25000125 ZZHC RX 250: Performed by: ANESTHESIOLOGY

## 2018-01-09 PROCEDURE — 02H633Z INSERTION OF INFUSION DEVICE INTO RIGHT ATRIUM, PERCUTANEOUS APPROACH: ICD-10-PCS | Performed by: PHYSICIAN ASSISTANT

## 2018-01-09 PROCEDURE — 27210794 ZZH OR GENERAL SUPPLY STERILE: Performed by: TRANSPLANT SURGERY

## 2018-01-09 PROCEDURE — 86706 HEP B SURFACE ANTIBODY: CPT

## 2018-01-09 PROCEDURE — 86901 BLOOD TYPING SEROLOGIC RH(D): CPT | Performed by: STUDENT IN AN ORGANIZED HEALTH CARE EDUCATION/TRAINING PROGRAM

## 2018-01-09 PROCEDURE — 87340 HEPATITIS B SURFACE AG IA: CPT

## 2018-01-09 PROCEDURE — C9399 UNCLASSIFIED DRUGS OR BIOLOG: HCPCS | Performed by: NURSE ANESTHETIST, CERTIFIED REGISTERED

## 2018-01-09 PROCEDURE — 87075 CULTR BACTERIA EXCEPT BLOOD: CPT | Performed by: STUDENT IN AN ORGANIZED HEALTH CARE EDUCATION/TRAINING PROGRAM

## 2018-01-09 PROCEDURE — 40000170 ZZH STATISTIC PRE-PROCEDURE ASSESSMENT II: Performed by: TRANSPLANT SURGERY

## 2018-01-09 PROCEDURE — 80048 BASIC METABOLIC PNL TOTAL CA: CPT | Performed by: STUDENT IN AN ORGANIZED HEALTH CARE EDUCATION/TRAINING PROGRAM

## 2018-01-09 PROCEDURE — 88304 TISSUE EXAM BY PATHOLOGIST: CPT | Performed by: TRANSPLANT SURGERY

## 2018-01-09 PROCEDURE — 25000128 H RX IP 250 OP 636

## 2018-01-09 PROCEDURE — 36415 COLL VENOUS BLD VENIPUNCTURE: CPT | Performed by: STUDENT IN AN ORGANIZED HEALTH CARE EDUCATION/TRAINING PROGRAM

## 2018-01-09 PROCEDURE — 86850 RBC ANTIBODY SCREEN: CPT | Performed by: STUDENT IN AN ORGANIZED HEALTH CARE EDUCATION/TRAINING PROGRAM

## 2018-01-09 PROCEDURE — 36000057 ZZH SURGERY LEVEL 3 1ST 30 MIN - UMMC: Performed by: TRANSPLANT SURGERY

## 2018-01-09 PROCEDURE — 27210995 ZZH RX 272: Performed by: TRANSPLANT SURGERY

## 2018-01-09 PROCEDURE — 87205 SMEAR GRAM STAIN: CPT | Performed by: STUDENT IN AN ORGANIZED HEALTH CARE EDUCATION/TRAINING PROGRAM

## 2018-01-09 PROCEDURE — 25000128 H RX IP 250 OP 636: Performed by: NURSE ANESTHETIST, CERTIFIED REGISTERED

## 2018-01-09 PROCEDURE — 86900 BLOOD TYPING SEROLOGIC ABO: CPT | Performed by: STUDENT IN AN ORGANIZED HEALTH CARE EDUCATION/TRAINING PROGRAM

## 2018-01-09 PROCEDURE — 71000015 ZZH RECOVERY PHASE 1 LEVEL 2 EA ADDTL HR: Performed by: TRANSPLANT SURGERY

## 2018-01-09 PROCEDURE — 25000125 ZZHC RX 250: Performed by: NURSE ANESTHETIST, CERTIFIED REGISTERED

## 2018-01-09 PROCEDURE — 12000025 ZZH R&B TRANSPLANT INTERMEDIATE

## 2018-01-09 PROCEDURE — 25000128 H RX IP 250 OP 636: Performed by: STUDENT IN AN ORGANIZED HEALTH CARE EDUCATION/TRAINING PROGRAM

## 2018-01-09 PROCEDURE — 87070 CULTURE OTHR SPECIMN AEROBIC: CPT | Performed by: STUDENT IN AN ORGANIZED HEALTH CARE EDUCATION/TRAINING PROGRAM

## 2018-01-09 PROCEDURE — 0DTJ4ZZ RESECTION OF APPENDIX, PERCUTANEOUS ENDOSCOPIC APPROACH: ICD-10-PCS | Performed by: TRANSPLANT SURGERY

## 2018-01-09 PROCEDURE — 37000009 ZZH ANESTHESIA TECHNICAL FEE, EACH ADDTL 15 MIN: Performed by: TRANSPLANT SURGERY

## 2018-01-09 PROCEDURE — C9290 INJ, BUPIVACAINE LIPOSOME: HCPCS | Performed by: ANESTHESIOLOGY

## 2018-01-09 RX ORDER — ONDANSETRON 4 MG/1
4 TABLET, ORALLY DISINTEGRATING ORAL EVERY 30 MIN PRN
Status: DISCONTINUED | OUTPATIENT
Start: 2018-01-09 | End: 2018-01-09

## 2018-01-09 RX ORDER — SENNOSIDES 8.6 MG
1 TABLET ORAL DAILY
Status: DISCONTINUED | OUTPATIENT
Start: 2018-01-09 | End: 2018-01-10 | Stop reason: HOSPADM

## 2018-01-09 RX ORDER — CALCIUM CARBONATE 500 MG/1
500 TABLET, CHEWABLE ORAL 3 TIMES DAILY
Status: DISCONTINUED | OUTPATIENT
Start: 2018-01-09 | End: 2018-01-09

## 2018-01-09 RX ORDER — PROPOFOL 10 MG/ML
INJECTION, EMULSION INTRAVENOUS PRN
Status: DISCONTINUED | OUTPATIENT
Start: 2018-01-09 | End: 2018-01-09

## 2018-01-09 RX ORDER — CEFAZOLIN SODIUM 2 G/100ML
2 INJECTION, SOLUTION INTRAVENOUS
Status: COMPLETED | OUTPATIENT
Start: 2018-01-09 | End: 2018-01-09

## 2018-01-09 RX ORDER — SODIUM CHLORIDE 9 MG/ML
INJECTION, SOLUTION INTRAVENOUS CONTINUOUS
Status: DISCONTINUED | OUTPATIENT
Start: 2018-01-09 | End: 2018-01-10

## 2018-01-09 RX ORDER — OXYCODONE HYDROCHLORIDE 5 MG/1
5 TABLET ORAL EVERY 4 HOURS PRN
Status: DISCONTINUED | OUTPATIENT
Start: 2018-01-09 | End: 2018-01-10 | Stop reason: HOSPADM

## 2018-01-09 RX ORDER — NICOTINE POLACRILEX 4 MG
15-30 LOZENGE BUCCAL
Status: DISCONTINUED | OUTPATIENT
Start: 2018-01-09 | End: 2018-01-10 | Stop reason: HOSPADM

## 2018-01-09 RX ORDER — ATORVASTATIN CALCIUM 10 MG/1
10 TABLET, FILM COATED ORAL DAILY
Status: DISCONTINUED | OUTPATIENT
Start: 2018-01-09 | End: 2018-01-10 | Stop reason: HOSPADM

## 2018-01-09 RX ORDER — SEVELAMER HYDROCHLORIDE 800 MG/1
800 TABLET, FILM COATED ORAL
Status: DISCONTINUED | OUTPATIENT
Start: 2018-01-09 | End: 2018-01-09

## 2018-01-09 RX ORDER — SODIUM CHLORIDE, SODIUM LACTATE, POTASSIUM CHLORIDE, CALCIUM CHLORIDE 600; 310; 30; 20 MG/100ML; MG/100ML; MG/100ML; MG/100ML
INJECTION, SOLUTION INTRAVENOUS CONTINUOUS
Status: DISCONTINUED | OUTPATIENT
Start: 2018-01-09 | End: 2018-01-09

## 2018-01-09 RX ORDER — CHLORAL HYDRATE 500 MG
1 CAPSULE ORAL DAILY
Status: DISCONTINUED | OUTPATIENT
Start: 2018-01-09 | End: 2018-01-10 | Stop reason: HOSPADM

## 2018-01-09 RX ORDER — LISINOPRIL 20 MG/1
20 TABLET ORAL DAILY
Status: DISCONTINUED | OUTPATIENT
Start: 2018-01-09 | End: 2018-01-09

## 2018-01-09 RX ORDER — KETAMINE HYDROCHLORIDE 10 MG/ML
INJECTION, SOLUTION INTRAMUSCULAR; INTRAVENOUS PRN
Status: DISCONTINUED | OUTPATIENT
Start: 2018-01-09 | End: 2018-01-09

## 2018-01-09 RX ORDER — BUPIVACAINE HYDROCHLORIDE 2.5 MG/ML
INJECTION, SOLUTION EPIDURAL; INFILTRATION; INTRACAUDAL PRN
Status: DISCONTINUED | OUTPATIENT
Start: 2018-01-09 | End: 2018-01-09

## 2018-01-09 RX ORDER — HEPARIN SODIUM 1000 [USP'U]/ML
3 INJECTION, SOLUTION INTRAVENOUS; SUBCUTANEOUS ONCE
Status: DISCONTINUED | OUTPATIENT
Start: 2018-01-09 | End: 2018-01-10 | Stop reason: HOSPADM

## 2018-01-09 RX ORDER — FENTANYL CITRATE 50 UG/ML
25-50 INJECTION, SOLUTION INTRAMUSCULAR; INTRAVENOUS
Status: DISCONTINUED | OUTPATIENT
Start: 2018-01-09 | End: 2018-01-09

## 2018-01-09 RX ORDER — NALOXONE HYDROCHLORIDE 0.4 MG/ML
.1-.4 INJECTION, SOLUTION INTRAMUSCULAR; INTRAVENOUS; SUBCUTANEOUS
Status: DISCONTINUED | OUTPATIENT
Start: 2018-01-09 | End: 2018-01-09 | Stop reason: HOSPADM

## 2018-01-09 RX ORDER — LIDOCAINE HYDROCHLORIDE 20 MG/ML
INJECTION, SOLUTION INFILTRATION; PERINEURAL PRN
Status: DISCONTINUED | OUTPATIENT
Start: 2018-01-09 | End: 2018-01-09

## 2018-01-09 RX ORDER — VIT B COMP NO.3/FOLIC/C/BIOTIN 1 MG-60 MG
1 TABLET ORAL DAILY
Status: DISCONTINUED | OUTPATIENT
Start: 2018-01-09 | End: 2018-01-10 | Stop reason: HOSPADM

## 2018-01-09 RX ORDER — DILTIAZEM HYDROCHLORIDE 180 MG/1
180 CAPSULE, COATED, EXTENDED RELEASE ORAL DAILY
Status: DISCONTINUED | OUTPATIENT
Start: 2018-01-09 | End: 2018-01-10 | Stop reason: HOSPADM

## 2018-01-09 RX ORDER — FENTANYL CITRATE 50 UG/ML
INJECTION, SOLUTION INTRAMUSCULAR; INTRAVENOUS PRN
Status: DISCONTINUED | OUTPATIENT
Start: 2018-01-09 | End: 2018-01-09

## 2018-01-09 RX ORDER — ONDANSETRON 2 MG/ML
4 INJECTION INTRAMUSCULAR; INTRAVENOUS EVERY 30 MIN PRN
Status: DISCONTINUED | OUTPATIENT
Start: 2018-01-09 | End: 2018-01-09

## 2018-01-09 RX ORDER — SODIUM BICARBONATE 650 MG/1
1300 TABLET ORAL 2 TIMES DAILY
Status: DISCONTINUED | OUTPATIENT
Start: 2018-01-09 | End: 2018-01-10 | Stop reason: HOSPADM

## 2018-01-09 RX ORDER — DEXTROSE MONOHYDRATE 25 G/50ML
25-50 INJECTION, SOLUTION INTRAVENOUS
Status: DISCONTINUED | OUTPATIENT
Start: 2018-01-09 | End: 2018-01-10 | Stop reason: HOSPADM

## 2018-01-09 RX ORDER — GENTAMICIN SULFATE 1 MG/G
CREAM TOPICAL DAILY
Status: DISCONTINUED | OUTPATIENT
Start: 2018-01-09 | End: 2018-01-10 | Stop reason: HOSPADM

## 2018-01-09 RX ADMIN — SODIUM CHLORIDE: 9 INJECTION, SOLUTION INTRAVENOUS at 11:24

## 2018-01-09 RX ADMIN — MIDAZOLAM 2 MG: 1 INJECTION INTRAMUSCULAR; INTRAVENOUS at 11:24

## 2018-01-09 RX ADMIN — FENTANYL CITRATE 50 MCG: 50 INJECTION INTRAMUSCULAR; INTRAVENOUS at 14:19

## 2018-01-09 RX ADMIN — Medication 1 CAPSULE: at 21:49

## 2018-01-09 RX ADMIN — ROCURONIUM BROMIDE 20 MG: 10 INJECTION INTRAVENOUS at 12:04

## 2018-01-09 RX ADMIN — LIDOCAINE HYDROCHLORIDE 60 MG: 20 INJECTION, SOLUTION INFILTRATION; PERINEURAL at 11:39

## 2018-01-09 RX ADMIN — PHENYLEPHRINE HYDROCHLORIDE 100 MCG: 10 INJECTION, SOLUTION INTRAMUSCULAR; INTRAVENOUS; SUBCUTANEOUS at 12:35

## 2018-01-09 RX ADMIN — PROPOFOL 180 MG: 10 INJECTION, EMULSION INTRAVENOUS at 11:39

## 2018-01-09 RX ADMIN — SODIUM CHLORIDE 250 ML: 9 INJECTION, SOLUTION INTRAVENOUS at 16:10

## 2018-01-09 RX ADMIN — ATORVASTATIN CALCIUM 10 MG: 10 TABLET, FILM COATED ORAL at 21:46

## 2018-01-09 RX ADMIN — SENNOSIDES 1 TABLET: 8.6 TABLET, FILM COATED ORAL at 21:49

## 2018-01-09 RX ADMIN — PHENYLEPHRINE HYDROCHLORIDE 100 MCG: 10 INJECTION, SOLUTION INTRAMUSCULAR; INTRAVENOUS; SUBCUTANEOUS at 12:02

## 2018-01-09 RX ADMIN — PHENYLEPHRINE HYDROCHLORIDE 100 MCG: 10 INJECTION, SOLUTION INTRAMUSCULAR; INTRAVENOUS; SUBCUTANEOUS at 11:51

## 2018-01-09 RX ADMIN — PHENYLEPHRINE HYDROCHLORIDE 50 MCG: 10 INJECTION, SOLUTION INTRAMUSCULAR; INTRAVENOUS; SUBCUTANEOUS at 12:26

## 2018-01-09 RX ADMIN — Medication 0.5 MG: at 14:05

## 2018-01-09 RX ADMIN — CEFAZOLIN SODIUM 2 G: 2 INJECTION, SOLUTION INTRAVENOUS at 11:47

## 2018-01-09 RX ADMIN — KETAMINE HCL-NACL SOLN PREF SY 50 MG/5ML-0.9% (10MG/ML) 20 MG: 10 SOLUTION PREFILLED SYRINGE at 12:20

## 2018-01-09 RX ADMIN — ONDANSETRON 4 MG: 2 INJECTION INTRAMUSCULAR; INTRAVENOUS at 11:53

## 2018-01-09 RX ADMIN — DILTIAZEM HYDROCHLORIDE 180 MG: 180 CAPSULE, COATED, EXTENDED RELEASE ORAL at 08:34

## 2018-01-09 RX ADMIN — PHENYLEPHRINE HYDROCHLORIDE 100 MCG: 10 INJECTION, SOLUTION INTRAMUSCULAR; INTRAVENOUS; SUBCUTANEOUS at 12:12

## 2018-01-09 RX ADMIN — OXYCODONE HYDROCHLORIDE 5 MG: 5 TABLET ORAL at 21:20

## 2018-01-09 RX ADMIN — BUPIVACAINE 20 ML: 13.3 INJECTION, SUSPENSION, LIPOSOMAL INFILTRATION at 11:38

## 2018-01-09 RX ADMIN — Medication 0.2 MG: at 16:54

## 2018-01-09 RX ADMIN — KETAMINE HCL-NACL SOLN PREF SY 50 MG/5ML-0.9% (10MG/ML) 20 MG: 10 SOLUTION PREFILLED SYRINGE at 13:51

## 2018-01-09 RX ADMIN — Medication: at 16:10

## 2018-01-09 RX ADMIN — WATER 500 MG: 1 INJECTION INTRAMUSCULAR; INTRAVENOUS; SUBCUTANEOUS at 10:11

## 2018-01-09 RX ADMIN — B-COMPLEX W/ C & FOLIC ACID TAB 1 MG 1 TABLET: 1 TAB at 08:34

## 2018-01-09 RX ADMIN — ROCURONIUM BROMIDE 20 MG: 10 INJECTION INTRAVENOUS at 12:41

## 2018-01-09 RX ADMIN — PROPOFOL 20 MG: 10 INJECTION, EMULSION INTRAVENOUS at 11:58

## 2018-01-09 RX ADMIN — SODIUM CHLORIDE: 9 INJECTION, SOLUTION INTRAVENOUS at 02:34

## 2018-01-09 RX ADMIN — ROCURONIUM BROMIDE 50 MG: 10 INJECTION INTRAVENOUS at 11:39

## 2018-01-09 RX ADMIN — FENTANYL CITRATE 50 MCG: 50 INJECTION, SOLUTION INTRAMUSCULAR; INTRAVENOUS at 11:39

## 2018-01-09 RX ADMIN — BUPIVACAINE HYDROCHLORIDE 20 ML: 2.5 INJECTION, SOLUTION EPIDURAL; INFILTRATION; INTRACAUDAL at 11:38

## 2018-01-09 RX ADMIN — SODIUM CHLORIDE 1000 ML: 9 INJECTION, SOLUTION INTRAVENOUS at 16:10

## 2018-01-09 RX ADMIN — SODIUM BICARBONATE 1300 MG: 650 TABLET ORAL at 21:47

## 2018-01-09 RX ADMIN — SUGAMMADEX 200 MG: 100 INJECTION, SOLUTION INTRAVENOUS at 13:48

## 2018-01-09 RX ADMIN — KETAMINE HCL-NACL SOLN PREF SY 50 MG/5ML-0.9% (10MG/ML) 10 MG: 10 SOLUTION PREFILLED SYRINGE at 12:46

## 2018-01-09 NOTE — ANESTHESIA PREPROCEDURE EVALUATION
Anesthesia Evaluation     . Pt has had prior anesthetic. Type: General    History of anesthetic complications    urinary retention      ROS/MED HX    ENT/Pulmonary:  - neg pulmonary ROS     Neurologic:  - neg neurologic ROS     Cardiovascular:     (+) hypertension----. : . . . :. .       METS/Exercise Tolerance:     Hematologic:     (+) Anemia, -      Musculoskeletal:  - neg musculoskeletal ROS       GI/Hepatic:         Renal/Genitourinary: Comment: Last PD overnight (1/2 session)  PD has been malpositioned (alarming during PD) for over 1 month    (+) chronic renal disease, type: ESRD, Pt requires dialysis, type: Peritoneal dialysis,       Endo:         Psychiatric:  - neg psychiatric ROS       Infectious Disease:         Malignancy:         Other:                     Physical Exam  Normal systems: cardiovascular and pulmonary    Airway   Mallampati: II  TM distance: >3 FB  Neck ROM: full    Dental   (+) chipped    Cardiovascular   Rhythm and rate: regular      Pulmonary    breath sounds clear to auscultation                        Anesthesia Plan      History & Physical Review  History and physical reviewed and following examination; no interval change.    ASA Status:  3 .    NPO Status:  > 8 hours    Plan for General and ETT with Intravenous induction. Maintenance will be Balanced.    PONV prophylaxis:  Ondansetron (or other 5HT-3) and Dexamethasone or Solumedrol  H/o urinary retention with prior anesthetic. Will limit narcotic use and monitor post-op.      Postoperative Care  Postoperative pain management:  Multi-modal analgesia.      Consents  Anesthetic plan, risks, benefits and alternatives discussed with:  Patient..                          .

## 2018-01-09 NOTE — ED NOTES
[facility], [place]   ED Nurse to Floor Handoff     Cesar Villalobos is a 52 year old male who speaks English and lives with family members,  in a home  They arrived in the ED by car from home    ED Chief Complaint: Abdominal Pain    ED Dx;   Final diagnoses:   Hyperkalemia   Acute appendicitis with localized peritonitis         Needed?: No    Allergies:   Allergies   Allergen Reactions     Nsaids      Swelling and Hives     .  Past Medical Hx:   Past Medical History:   Diagnosis Date     Anemia in chronic kidney disease      CKD (chronic kidney disease), stage IV (H)      Dyslipidemia      Hypertension       Baseline Mental status: WDL  Current Mental Status changes: at basesline    Infection: No  Sepsis suspected: No  Isolation type: No active isolations     Activity level - Baseline/Home:  Stand with Assist  Activity Level - Current:   Stand with Assist    Bariatric equipment needed?: No    In the ED these meds were given:   Medications   dextrose 10% infusion ( Intravenous Rate/Dose Change 1/8/18 2203)   iohexol (OMNIPAQUE) solution 50 mL (50 mLs Oral Given 1/8/18 1532)   ertapenem (INVanz) 1 g in 10 mL SWFI for IVP (1 g Intravenous Given 1/8/18 1806)   furosemide (LASIX) injection 40 mg (40 mg Intravenous Given 1/8/18 2029)   sodium polystyrene (KAYEXALATE) suspension 30 g (30 g Oral Given 1/8/18 2028)   sodium bicarbonate 8.4 % injection 50 mEq (50 mEq Intravenous Given 1/8/18 2030)   dextrose 50 % injection 25 g (25 g Intravenous Given 1/8/18 2020)   insulin (regular) (HumuLIN R/NovoLIN R) injection 9.5 Units (9.5 Units Intravenous Given 1/8/18 2027)       Drips running?  No    Home pump or pre-existing LDA's present? Yes. PD cath in abdomen    Labs results:   Labs Ordered and Resulted from Time of ED Arrival Up to the Time of Departure from the ED   UA MACROSCOPIC WITH REFLEX TO MICRO AND CULTURE - Abnormal; Notable for the following:        Result Value    Glucose  Urine 30 (*)     Blood Urine Trace (*)     Protein Albumin Urine 10 (*)     Bacteria Urine Few (*)     All other components within normal limits   BASIC METABOLIC PANEL - Abnormal; Notable for the following:     Potassium 5.9 (*)     Chloride 111 (*)     Carbon Dioxide 16 (*)     Urea Nitrogen 125 (*)     Creatinine 11.60 (*)     GFR Estimate 5 (*)     GFR Estimate If Black 6 (*)     All other components within normal limits   CBC WITH PLATELETS DIFFERENTIAL - Abnormal; Notable for the following:     RBC Count 3.09 (*)     Hemoglobin 9.4 (*)     Hematocrit 29.2 (*)     All other components within normal limits   LIPASE - Abnormal; Notable for the following:     Lipase 566 (*)     All other components within normal limits   POTASSIUM - Abnormal; Notable for the following:     Potassium 5.8 (*)     All other components within normal limits   GLUCOSE BY METER - Abnormal; Notable for the following:     Glucose 45 (*)     All other components within normal limits   GLUCOSE BY METER - Abnormal; Notable for the following:     Glucose 133 (*)     All other components within normal limits   GLUCOSE BY METER - Abnormal; Notable for the following:     Glucose 152 (*)     All other components within normal limits   GLUCOSE BY METER - Abnormal; Notable for the following:     Glucose 165 (*)     All other components within normal limits   LACTIC ACID WHOLE BLOOD   INR   PARTIAL THROMBOPLASTIN TIME   POTASSIUM   POTASSIUM   PERIPHERAL IV CATHETER   STRICT INTAKE AND OUTPUT       Imaging Studies:   Recent Results (from the past 24 hour(s))   CT Abdomen Pelvis w/o Contrast   Result Value    Radiologist flags Findings concerning for acute appendicitis (Urgent)    Radiologist flags Pulmonary nodules    Narrative    EXAMINATION: CT ABDOMEN PELVIS W/O CONTRAST, 1/8/2018 5:04 PM    TECHNIQUE:  Helical CT images from the lung bases through the pubic  symphysis were obtained without IV contrast.     COMPARISON: 11/27/2017  radiographs    HISTORY: RLQ pain, recent laparoscopic surgery for repositioning of  peritoneal dialysis catheter, concern for possible appendicitis;     FINDINGS:    Abdomen and pelvis: Evaluation is limited due to absence of  intravenous contrast injection. Ovoid low-attenuation region in the  posterior left hepatic lobe measuring 3.2 x 2.7 cm (series 5, image  93). Additional smaller, similar-appearing focus in the hepatic dome  (series 5, image 55). The liver is otherwise unremarkable. No intra or  extrahepatic biliary dilation. The gallbladder is within normal  limits. The spleen, pancreas, and adrenal glands are unremarkable.  Mildly atrophic bilateral native kidneys. Bilateral exophytic rounded  renal cortical hypodensities, the largest of which measure simple  fluid density, compatible with cysts. Several of these hypodensities  are too small to fully characterize but are favored to represent cysts  as well. No hydronephrosis, hydroureter, or urinary tract stone. The  bladder is within normal limits. Mildly enlarged prostate. Symmetric  seminal vesicles. Pelvic phleboliths. Small amount of free fluid  adjacent to a peritoneal dialysis catheter tip coiled in the right  lower quadrant adjacent to the appendix which demonstrates mild  distention and wall thickening at its midsegment with mild surrounding  fat stranding (series 5, image 300). Several foci of free  intraperitoneal air noted within the upper abdomen about the liver. No  bowel obstruction. No abdominal or pelvic lymphadenopathy.    Lung bases:  Scattered sub-3 mm bilateral pulmonary nodules in the  lower lobes, for example series 5 image 33; series 5 image 51 in the  right lower lobe, series 5 image 53 in the left lower lobe. No acute  airspace opacities. Mild bilateral lower lobes dependent atelectasis.  No pleural effusions.    Bones and soft tissues: Hemangioma in the L3 and L4 vertebral bodies.  5 lumbar vertebral bodies were considered for the  purposes of this  dictation with lumbarization of S1. Bilateral chronic pars  interarticularis defects at L5 with associated grade 1/2  anterolisthesis and moderate to severe intervertebral disc space  narrowing. Benign-appearing sclerotic focus in the proximal right  femur intertrochanteric region. No acute or aggressive osseous lesion.      Impression    IMPRESSION:   1. Mild inflammatory changes of the appendix which is surrounded by  the coiled tip of a peritoneal dialysis catheter, findings concerning  for acute appendicitis.  2. Several foci of free air in the upper abdomen, likely related to  recent peritoneal dialysis catheter repositioning.  3. Hypodensities in the liver are indeterminate on this noncontrast  CT. Consider initial further evaluation with ultrasound on a  nonemergent basis or comparison with outside imaging if available.  4. Bilateral lower lobes pulmonary nodules measuring up to 3 mm. In a  low risk patient no further workup is warranted. If the patient is  high risk consider follow-up with chest CT in 12 months.    [Urgent Result: Findings concerning for acute appendicitis]    Finding was identified on 1/8/2018 5:10 PM.     Dr. Rendon was contacted by Dr. Douglass at 1/8/2018 5:21 PM and  verbalized understanding of the urgent finding.    [Consider Follow Up: Pulmonary nodules]    This report will be copied to the [facility] to ensure a  provider acknowledges the finding.        I have personally reviewed the examination and initial interpretation  and I agree with the findings.    ZORAIDA GRUBBS MD       Recent vital signs:   /68  Pulse 79  Temp 98.6  F (37  C)  Resp 15  Wt 96 kg (211 lb 11.2 oz)  SpO2 91%  BMI 26.46 kg/m2    Cardiac Rhythm: Normal Sinus  Pt needs tele? Yes  Skin/wound Issues: None    Code Status: Full Code    Pain control: good    Nausea control: good    Abnormal labs/tests/findings requiring intervention: K elevated. Given Kayexalate and K  cocktail    Family present during ED course? Yes   Family Comments/Social Situation comments: None    Tasks needing completion: None    Cierra Cardenas, RN      [phone] [facility]

## 2018-01-09 NOTE — PROGRESS NOTES
Transplant Surgery  Inpatient Daily Progress Note  01/09/2018    Assessment & Plan: 53yo with history of ESKD due to HTN, on PD since 6/2017.  He is s/p repositioning of PD catheter on 1/5/18, at the time his catheter was noted to be stuck to his appendix.  He returned 1/9/18 with PD intolerance due to abdominal pain and hyperkalemia (missed 2 dialysis exchanges).  CT scan:  acute appendicitis, with catheter in the RLQ adjacent to the appendix.    Cardiorespiratory: Small pulmonary nodules noted incidentally on CT, max 3mm.  No history of smoking.  Follow up with PCP.  Hematology: Mild normocytic anemia.  Access: Place tunneled HD line today.  GI/Nutrition: NPO for surgery  Appendicitis:  POD #4 after PD cath repositioning, cath was adherent to appendix at that time.  Presented with painful PD, abdominal pain, and finding of inflamed appendix on CT.   Plan for appendectomy today.  Liver hypodensities:  Incidentally noted on CT.  Will need follow up US outpatient.  Fluid/Electrolytes: MIVF: NS @ 50 (makes urine)  Hyperkalemia:  Shifted last evening.  Now trending up 4.7->5.2.  Continue to monitor and dialyze today.  ESKD:  Use of PD cath limited by pain (catheter is adjacent to appendix).  Plan to place dialysis line today and start hemodialysis.  Nephrology consulted.  Endocrine: No acute issues.  : Hx urinary retention with anesthesia.  Will need to monitor post-op.  Infectious disease: Afebrile, no leukocytosis.  Appendicitis:  On ertapenem.  Plan for appendectomy today.  Prophylaxis: Ambulate  Activity: Up ad gabriel  Disposition: 6C tele    Medical Decision Making: Medium  Subsequent visit [id number] (moderate level decision making)    RAGHAVENDRA/Fellow/Resident Provider: Margo Elam NP [phone]    Faculty: Caden Tay M.D., Ph.D.    __________________________________________________________________    Interval History: History is obtained from the patient  Overnight events: Admitted with hyperkalemia and appendicitis.   "Endorses mild abdominal pain, declines narcotics.  Feels otherwise nervous but OK.    ROS:   A 10-point review of systems was negative except as noted above.    Meds:    NEPHRO-OLI RX  1 tablet Oral Daily     calcium acetate  2,001 mg Oral TID w/meals     diltiazem  180 mg Oral Daily     ceFAZolin  2 g Intravenous Pre-Op/Pre-procedure x 1 dose     ceFAZolin  1 g Intravenous See Admin Instructions     ertapenem (INVanz) IV  500 mg Intravenous Q24H     sodium chloride (PF)  3 mL Intracatheter Q8H       Physical Exam:     Admit Weight: 96 kg (211 lb 11.2 oz)    Current vitals:   /68 (BP Location: Left arm)  Pulse 79  Temp 98.8  F (37.1  C) (Oral)  Resp 16  Ht 1.88 m (6' 2\")  Wt 96 kg (211 lb 10.3 oz)  SpO2 95%  BMI 27.17 kg/m2    Vital sign ranges:    Temp:  [98.2  F (36.8  C)-98.8  F (37.1  C)] 98.8  F (37.1  C)  Pulse:  [79] 79  Heart Rate:  [65-98] 77  Resp:  [12-26] 16  BP: ()/(63-90) 126/68  SpO2:  [91 %-100 %] 95 %  Patient Vitals for the past 24 hrs:   BP Temp Temp src Pulse Heart Rate Resp SpO2 Height Weight   01/09/18 0812 126/68 98.8  F (37.1  C) Oral - 77 16 95 % - -   01/09/18 0355 - - - - 81 16 - - -   01/08/18 2327 109/77 98.2  F (36.8  C) Oral - 85 16 97 % 1.88 m (6' 2\") 96 kg (211 lb 10.3 oz)   01/08/18 2240 - - - - - - 96 % - -   01/08/18 2130 111/68 - - - 92 15 91 % - -   01/08/18 2115 - - - - 98 26 99 % - -   01/08/18 2100 136/90 - - - - - - - -   01/08/18 2030 140/82 - - - 73 18 96 % - -   01/08/18 2000 127/79 98.6  F (37  C) - - 65 17 92 % - -   01/08/18 1845 - - - - 88 12 - - -   01/08/18 1815 - - - - 80 17 - - -   01/08/18 1744 - - - - 75 13 99 % - -   01/08/18 1645 130/88 - - - - - 99 % - -   01/08/18 1630 134/89 - - - - - 100 % - -   01/08/18 1615 130/85 - - - - - 99 % - -   01/08/18 1600 135/88 - - - - - 100 % - -   01/08/18 1545 128/84 - - - - - 100 % - -   01/08/18 1530 125/82 - - - - - 100 % - -   01/08/18 1515 127/84 - - - - - 99 % - -   01/08/18 1500 142/90 - - - - - " 100 % - -   01/08/18 1316 95/63 98.4  F (36.9  C) Oral 79 - 16 98 % - 96 kg (211 lb 11.2 oz)     General Appearance: in no apparent distress.   Skin: normal, warm, dry  Heart: NSR  Lungs: CTA  Abdomen: The abdomen is mildly tender (thorogh exam by Minesh Suazo this morning)  : No granados  Extremities: edema: None  Neurologic: A&Ox4, fine tremor, strength 5/5    Data:   CMP  Recent Labs  Lab 01/09/18  0544 01/08/18  2154  01/08/18  1503     --   --  141   POTASSIUM 5.2 4.7  < > 5.9*   CHLORIDE 108  --   --  111*   CO2 20  --   --  16*   GLC 91  --   --  84   *  --   --  125*   CR 12.10*  --   --  11.60*   GFRESTIMATED 4*  --   --  5*   GFRESTBLACK 5*  --   --  6*   KAI 8.8  --   --  9.1   LIPASE  --   --   --  566*   < > = values in this interval not displayed.  CBC  Recent Labs  Lab 01/08/18  1503 01/05/18  0706   HGB 9.4* 10.5*   WBC 6.6 6.3    220     COAGS  Recent Labs  Lab 01/08/18  2009 01/05/18  0706   INR 0.94 0.88   PTT 25 27      Urinalysis  Recent Labs   Lab Test  01/08/18   1449  10/14/17   1503  09/21/17   1107  06/22/17   1104   COLOR  Straw  Light Yellow   --    --    APPEARANCE  Clear  Clear   --    --    URINEGLC  30*  30*   --    --    URINEBILI  Negative  Negative   --    --    URINEKETONE  Negative  Negative   --    --    SG  1.007  1.006   --    --    UBLD  Trace*  Small*   --    --    URINEPH  6.0  7.0   --    --    PROTEIN  10*  10*   --    --    NITRITE  Negative  Negative   --    --    LEUKEST  Negative  Negative   --    --    RBCU  0  0   --    --    WBCU  <1  <1   --    --    UTPG   --    --   0.92*  0.77*

## 2018-01-09 NOTE — PLAN OF CARE
Problem: Patient Care Overview  Goal: Plan of Care/Patient Progress Review  Outcome: No Change  Pt admitted last night with abdominal pain, hyperkalemia and appendicitis. VSS, SR on tele, BG wnl at 0200. MIV @50mL/hr via piv. Elevated K treated in ED prior to arrival to floor, see MAR for details (kayexelate, sodium bicarb, insulin, lasix, dextrose). Pt has been kept NPO since midnight for lap appy and tunneled dialysis line placement today. Pt slept well overnight without complaint, only slight abdominal discomfort voiced. Pt needs pre-op scrub (will initiate if pt wakes prior to shift change), consent and pre-op checklist completed. Pt is independent. Continue to monitor, follow poc, alert MD's with changes and/or concerns.

## 2018-01-09 NOTE — CONSULTS
Nephrology Initial Consult  January 8, 2018      Cesar Villalobos MRN:[id number] YOB: 1965  Date of Admission:1/8/2018  Primary care provider: Sallie Olsen  Requesting physician: Linda Rendon MD    ASSESSMENT AND RECOMMENDATIONS:   ESKD:  Cause not clearly known but could be related to hx of kidney infections. on peritoneal dialysis since 6/2017, on transplant list since 2014, had 2 episode of peritonitis last one Oct 2017, still making urine dialy, no uremic symptoms. Appendicitis now and after surgery he needs several weeks for it to recover before using his cathter, will change to iHD, discuss with patient options of placing temporary line for dialysis tonight to correct his K, and also to prime him for surgery but eventually he needs tunneled cathter place for long term use. He decided to correct his K medically and wait for IR line placement at am. Also discuss with him that if his K goes higher than 6.0 after all the maneuver to correct it will open up the options to place the line emergently at the room using US guidance that line need to be changed to tunneled line in the future, he agree with this plan, this plan was communicated with IR, Surgery and ER team.     Hyperkalemia: K is higher normal 5.9, repeated 5.8.  EKG normal. Options to try medical management since he still making urine, will try lasix 40mg IV, can give bolus of IVF if needed normal saline 500cc, kayexalate, insulin/D50, IV Nabicarb one dose. Repeat serum K after 4 hr. If he did not response and his K is higher than 6 will consider placing dialysis cathter and start iHD tonight for at least 2 hr run with K 2 bath.     HTN and volume: initial BP controlled, no issue , recommends holding lisinopril due to hyperkalemia. euvolemic can try lasix for hyperkalemia shifting. But might also consider replacing fluid with normal saline 500cc, since he will be NPO,     Acid base: non gap metabolic acidosis bicarb is 16,  give IV bicarb now and resume home Na bicarb after surgery.     Anemia: Hgb 9.4, stable no bleeding, EPO was on hold, can resume EPO after surgery, consider checking iron study. Avoid transfusion if possible he is a candidate for transplant.     BMD: will obtain records from [facility] at am, cont low phos diet , check serum Ca and phos.     Appendicitis:  Managed by surgery team, scheduled for surgery at AM.     Recommendations were communicated to primary team via phone and in person. I discuss plan of care with surgery Dr Edge, and ER Dr Rendon.     Patient seen and discussed  with Dr. Bennett.  Beverly Hong  Nephrology Fellow  Pager: [phone]      REASON FOR CONSULT: Hyperkalemia, dialysis    HISTORY OF PRESENT ILLNESS:  Cesar Villalobos is a 52 year old with past medical history of HTN and ESKD started on peritoneal dialysis 6/2017, cause is not yet known no biopsy done, history of hematuria back in 2002 thought it was related to UTI and treated with abx, resolved, no other episode since then, hx of  proteinuria up to 1.1 gram back in 2015 improved with ACEI, regarding his kidney function his GFR was ok then in 2007 start to go down 59 at that time no intervention done, no Hx of diabetes, no more episode of hematuria, no use of NSAIDs, and no immunologic disease, his brother is also diagnosed with renal failure at age of 53 due to cancer radiation,  then in 2010 his PCP notice his Cr is 1.8 and referred him to nephrology, then his GFR decline again in 2012 to as low as 29 his renal US at that time was normal. Since then his GFR continuously decline to as low as 6 then electively PD cathter placed and started on PD 6/2017, since then he had 2 episode of peritonitis las Oct 2017, last week he had difficulties with his dialysis his cycler was losing about 1 hr to 1.5 hr of dewell he was seen by Dr murrieta last week admitted for repositioned of his PD cathter as to migrate and was around his appendix with some  fibrin. It was repositioned and was discharge home Friday 1/5/2018 but since then he was not able to perform dialysis due to abdominal pain, today he came to ER, CT scan was done shows again the PD cathter coiled around the appendix with inflamed looking appendix concern for acute appendicitis. Now he is afebrile complaining of fatigue lost appetite and RLQ abdominal pain that get worse if he try using his PD.   PAST MEDICAL HISTORY:  Reviewed with patient on 01/08/2018     Past Medical History:   Diagnosis Date     Anemia in chronic kidney disease      CKD (chronic kidney disease), stage IV (H)      Dyslipidemia      Hypertension        Past Surgical History:   Procedure Laterality Date     HERNIA REPAIR, INGUINAL RT/LT Left     as child     LAPAROSCOPIC INSERTION CATHETER PERITONEAL DIALYSIS N/A 6/6/2017    Procedure: LAPAROSCOPIC INSERTION CATHETER PERITONEAL DIALYSIS;  Laparoscopic Peritoneal Dialysis Catheter Placement. ;  Surgeon: Caden Tay MD;  Location: [facility] OR     LAPAROSCOPIC INSERTION CATHETER PERITONEAL DIALYSIS N/A 1/5/2018    Procedure: LAPAROSCOPIC INSERTION CATHETER PERITONEAL DIALYSIS;  Laparoscopic Repositioning of Peritoneal Dialysis Catheter.;  Surgeon: Caden Tay MD;  Location: [facility] OR        MEDICATIONS:  PTA Meds  Prior to Admission medications    Medication Sig Last Dose Taking? Auth Provider   oxyCODONE IR (ROXICODONE) 5 MG tablet Take 1 tablet (5 mg) by mouth every 6 hours as needed for pain maximum 5 tablet(s) per day Past Week at Unknown time Yes Antonio Edge MD   diltiazem (DILT-XR) 180 MG 24 hr capsule Take 1 capsule (180 mg) by mouth daily 1/8/2018 at Unknown time Yes Merline Muñiz MD   lisinopril (PRINIVIL/ZESTRIL) 20 MG tablet Take 1 tablet (20 mg) by mouth daily 1/8/2018 at Unknown time Yes Merline Muñiz MD   sodium bicarbonate 650 MG tablet Take 2 tablets (1,300 mg) by mouth 2 times daily 1/8/2018 at Unknown time Yes Merline Muñiz MD    atorvastatin (LIPITOR) 10 MG tablet Take 1 tablet (10 mg) by mouth daily 1/8/2018 at Unknown time Yes Merline Muñiz MD   senna (SENOKOT) 8.6 MG tablet Take 1 tablet by mouth daily   Antonio Edge MD   ondansetron (ZOFRAN) 4 MG tablet Take 1 tablet (4 mg) by mouth every 8 hours as needed for nausea More than a month at Unknown time  Antonio Edge MD   gentamicin (GARAMYCIN) 0.1 % cream Apply topically daily   Merline Muñiz MD   calcium acetate (PHOSLO) 667 MG CAPS capsule Take 3 capsules (2,001 mg) by mouth 3 times daily (with meals)   Merline Muñiz MD   B Complex-C-Folic Acid (DIALYVITE 800) 0.8 MG TABS Take 1 tablet by mouth daily   Merline Muñiz MD   sevelamer (RENAGEL) 800 MG tablet Take 1 tablet (800 mg) by mouth 3 times daily (with meals)   Merline Muñiz MD   MELATONIN PO Take 2 mg by mouth At Bedtime   Reported, Patient   calcium carbonate (TUMS) 500 MG chewable tablet Take 1 chew tab by mouth 3 times daily Dose is 750mg   Reported, Patient   fish oil-omega-3 fatty acids (FISH OIL) 1000 MG capsule Take 1 capsule by mouth daily.   Reported, Patient      Current Meds    Infusion Meds      ALLERGIES:    Allergies   Allergen Reactions     Nsaids      Swelling and Hives         REVIEW OF SYSTEMS:  A comprehensive of systems was negative except as noted above.    SOCIAL HISTORY:   Social History     Social History     Marital status: Single     Spouse name: mike     Number of children: 1     Years of education: N/A     Occupational History      [employer]     Social History Main Topics     Smoking status: Never Smoker     Smokeless tobacco: Never Used     Alcohol use No     Drug use: No     Sexual activity: Yes     Other Topics Concern     Exercise No     Social History Narrative     Reviewed with patient    accompanies Cesar Villalobos in hospital room    FAMILY MEDICAL HISTORY:   Family History   Problem Relation Age of Onset     CANCER Brother      KIDNEY DISEASE Brother       due to XRT     HEART DISEASE Sister      Reviewed with patient     PHYSICAL EXAM:   Temp  Av.8  F (36.6  C)  Min: 96.6  F (35.9  C)  Max: 98.9  F (37.2  C)      Pulse  Av.3  Min: 71  Max: 86 Resp  Avg: 15.8  Min: 13  Max: 18  SpO2  Av %  Min: 95 %  Max: 100 %       /88  Pulse 79  Temp 98.4  F (36.9  C) (Oral)  Resp 13  Wt 96 kg (211 lb 11.2 oz)  SpO2 99%  BMI 26.46 kg/m2       Admit Weight: 96 kg (211 lb 11.2 oz)     GENERAL APPEARANCE: no distress,  awake  EYES: no scleral icterus, pupils equal  Endo: no goiter, no moon facies  Lymphatics: no cervical or supraclavicular LAD  Pulmonary: lungs clear to auscultation with equal breath sounds bilaterally, no clubbing  CV: regular rhythm, normal rate, no rub   - JVD not elevated    - Edema none   GI: soft, RLQ tenderness and rebound.   MS: no evidence of inflammation in joints, no muscle tenderness  : no granados  SKIN: no rash, warm, dry, no cyanosis  NEURO: face symmetric, no asterixis     LABS:   CMP  Recent Labs  Lab 18  1503 18  0706     --    POTASSIUM 5.9* 5.2   CHLORIDE 111*  --    CO2 16*  --    ANIONGAP 14  --    GLC 84 94   *  --    CR 11.60* 10.50*   GFRESTIMATED 5* 5*   GFRESTBLACK 6* 6*   KAI 9.1  --      CBC  Recent Labs  Lab 18  1503 18  0706   HGB 9.4* 10.5*   WBC 6.6 6.3   RBC 3.09* 3.48*   HCT 29.2* 33.1*   MCV 95 95   MCH 30.4 30.2   MCHC 32.2 31.7   RDW 14.6 14.6    220     INR  Recent Labs  Lab 18  0706   INR 0.88   PTT 27     ABGNo lab results found in last 7 days.   URINE STUDIES  Recent Labs   Lab Test  18   1449  10/14/17   1503  12/14/15   1647  09/09/15   1140   COLOR  Straw  Light Yellow  Light Yellow  Light Yellow   APPEARANCE  Clear  Clear  Clear  Clear   URINEGLC  30*  30*  Negative  Negative   URINEBILI  Negative  Negative  Negative  Negative   URINEKETONE  Negative  Negative  Negative  Negative   SG  1.007  1.006  1.008  1.008   UBLD  Trace*  Small*   Negative  Negative   URINEPH  6.0  7.0  5.5  5.5   PROTEIN  10*  10*  10*  30*   NITRITE  Negative  Negative  Negative  Negative   LEUKEST  Negative  Negative  Negative  Negative   RBCU  0  0  <1  0   WBCU  <1  <1  <1  <1     Recent Labs   Lab Test  09/21/17   1107  06/22/17   1104  05/25/17   1436  04/28/17   1257  01/18/17   1302  12/08/16   0904  11/07/16   0700  03/14/16   1507  09/09/15   1140  11/18/14   1604  07/14/14   1513  02/17/14   1350  08/19/13   1700  02/18/13   1631  07/06/12   1210   UTPG  0.92*  0.77*  0.69*  0.69*  0.61*  0.55*  0.36*  0.63*  1.19*  0.61*  0.40*  0.41*  0.29*  0.32*  0.15     PTH  Recent Labs   Lab Test  09/21/17   1004  06/22/17   1103  05/25/17   1427  04/28/17   1252  01/18/17   1306  12/08/16   0904  11/05/16   1139  03/14/16   1512  09/09/15   1143  11/18/14   1617  09/18/14   0843  02/18/13   1415  07/06/12   1153   PTHI  252*  312*  46  62  279*  346*  375*  157*  210*  210*  195*  149*  208*     IRON STUDIES  Recent Labs   Lab Test  06/22/17   1103  06/02/17   1213  05/22/17   1531  01/18/17   1306  03/14/16   1512  09/17/15   0924  11/18/14   1617  02/18/13   1415  07/06/12   1153   IRON  50  69  85  68  81  115  72  62  64   FEB  339  346  334  306  310  352  338  343  325   IRONSAT  15  20  25  22  26  33  21  18  20   WILL  199   --   383  274  246  216  187  173  226       IMAGING:  All imaging studies reviewed by me.   IMPRESSION:   1. Mild inflammatory changes of the appendix which is surrounded by  the coiled tip of a peritoneal dialysis catheter, findings concerning  for acute appendicitis.  2. Several foci of free air in the upper abdomen, likely related to  recent peritoneal dialysis catheter repositioning.  3. Hypodensities in the liver are indeterminate on this noncontrast  CT. Consider initial further evaluation with ultrasound on a  nonemergent basis or comparison with outside imaging if available.  4. Bilateral lower lobes pulmonary nodules measuring up to 3  mm. In a  low risk patient no further workup is warranted. If the patient is  high risk consider follow-up with chest CT in 12 months.       Beverly Hong MD    I did not see the patient,but  reviewed the clinical data, and discussed the case with the renal fellow by phone. See fellow's note for details. I agree with the evaluation and treatment plans. He will go for a tunnelled dialysis catheter in the morning as long as he remains stable.  Shivam Bennett MD  Nephrology Staff

## 2018-01-09 NOTE — ED PROVIDER NOTES
Patient was signed out to me by Dr. Rendon.  Please see her note for initial history and ED course. At the time of signout disposition was pending.  The plan is for the patient to be admitted to the transplant service.  Patient was observed in the emergency department and had his glucose and potassium rechecked.  He did have a low glucose level of 45.  This was treated with increasing his infusion of D10.  Patient was also given a meal.  Recheck glucose levels were around 150.  The repeat potassium level was improved at 4.7.  Patient will be admitted to a telemetry bed on the transplant service.  Plan is for him to be n.p.o. after midnight in anticipation of appendectomy to be done tomorrow.            Janene Justice MD  01/08/18 [id number]

## 2018-01-09 NOTE — PROGRESS NOTES
"  Nephrology Progress Note  01/09/2018     Mr Villalobos is a 51 yo male with ESRD on PD since 6/17, on Renal transplant list since 2014, admitted on 1/8/17 for appendicitis. Just had PD catheter repositioned on 1/5 because it was coiled around the appendix. PD catheter again found to be coiled around his appendix with inflammation. He was unable to perform exchanges following procedure on 1/5 due to pain. Last full PD treatment was 1/4. Was hyperkalemic upon admission with K 5.9, corrected to 5.2 this AM. He remains non oliguric     Assessment & Recommendations:     1. ESRD - Dr Muñiz is primary Nephrologist. PD care managed through the [facility] HD unit.    - Will have to hold on HD until cleared by Surgeon, likely several weeks   - PD catheter was \" re anchored lower in the pelvis with endo close stitch\"   - In interim will have IHD.    - Tunneled HD catheter placed today in OR   - Will have HD run today post op. Will reassess for tomorrow   - RNCC will have to confirm that [facility] has a HD chair available for several weeks prior to discharge   - Renal dose medications   - Avoid transfusions given transplant status    2. Volume status - Euvolemic. No edema/dyspnea/hypoxia. UO ~ 1000 cc last 24 hrs. Weight today 96.0 kg. SBP teens - 120/.    - UF goal 1 kg today   - Will confirm his EDW   - Continue daily weights/I/O    3. Electrolytes - K improved to 5.2 preop today. Was hyperkalemic given length of time w/o dialysis.     4. Acid base - No acute concerns. Bicarb 20    5. BMD - Ca 8.8. Phos likely elevated given no RRT for 5 dys.    - Resume Phoslo when taking po    6. Anemia - Hgb 9.4.    - Will check Aranesp dosing tomorrow   - Avoid transfusions given transplant status    7. Appendicitis, s/p lap appe - On Ertapenem. WBC 6.6. Afebrile.     8. HTN - Well controlled. Currently on Diltiazem 180 mg qd. Holding ACE given hyperkalemia upon admission, but will assess to restart post op. SBP teens - 120/ pre op. " "      Recommendations were communicated to primary team via progress notes. Spoke with Surgeon directly    Seen and discussed with Dr. Garo Schmidt, NP   [phone]    Interval History :     Preparing for OR this morning  Will have TDC placed during OR, then have HD post op  Remains non oliguric  Last HD was Thursday. Has not been able to tolerate PD since catheter repositioned on 1/5/18  Interval progress notes, imaging studies and labs reviewed    Review of Systems:   I reviewed the following systems:  GI: NPO for surgery  Neuro:  no confusion  Constitutional:  no fever or chills  CV: denies dyspnea, CP or edema.    : Voiding w/o granados    Physical Exam:   I/O last 3 completed shifts:  In: 280.83 [P.O.:60; I.V.:220.83]  Out: 1366 [Urine:1350; Blood:16]   /71  Pulse 79  Temp 97.7  F (36.5  C) (Oral)  Resp 10  Ht 1.88 m (6' 2\")  Wt 96 kg (211 lb 10.3 oz)  SpO2 97%  BMI 27.17 kg/m2     GENERAL APPEARANCE: NAD  EYES:  no scleral icterus, pupils equal  PULM: lungs CTA. Breathing is non labored. Not on supplemental oxygen.   CV: RRR       -edema- none  GI: soft, Tender in the RLQ. Has PD catheter LLQ.   INTEGUMENT: no cyanosis or rash  NEURO:  A/O   Access - PD catheter    Labs:   All labs reviewed by me  Electrolytes/Renal -   Recent Labs   Lab Test  01/09/18   1000  01/09/18   0544  01/08/18   2154   01/08/18   1503  01/05/18   0706  10/14/17   1455  09/21/17   1004  06/22/17   1103   06/02/17   1213   NA   --   141   --    --   141   --   140  138  139   < >  136   POTASSIUM  5.4*  5.2  4.7   < >  5.9*  5.2  4.7  4.7  5.4*   < >  4.5   CHLORIDE   --   108   --    --   111*   --   103  102  105   < >  97   CO2   --   20   --    --   16*   --   26  25  20   < >  24   BUN   --   126*   --    --   125*   --   91*  81*  101*   < >  99*   CR   --   12.10*   --    --   11.60*  10.50*  10.20*  9.94*  8.94*   < >  9.77*   GLC   --   91   --    --   84  94  101*  94  94   < >  152*   KAI   --   8.8   " --    --   9.1   --   9.7  9.3  8.8   < >  11.3*   MAG   --    --    --    --    --    --    --    --    --    --   2.7*   PHOS   --    --    --    --    --    --    --   7.1*  5.7*   --   6.4*    < > = values in this interval not displayed.       CBC -   Recent Labs   Lab Test  01/08/18   1503  01/05/18   0706  10/14/17   1455   WBC  6.6  6.3  8.3   HGB  9.4*  10.5*  11.1*   PLT  218  220  244       LFTs -   Recent Labs   Lab Test  09/21/17   1004  06/22/17   1103  06/06/17   0727  06/02/17   1213   09/18/14   0843   ALKPHOS   --    --   56  48   --   128   BILITOTAL   --    --   0.4  0.3   --   0.5   ALT   --    --   27  20   --   25   AST   --    --   15  14   --   14   PROTTOTAL   --    --   7.3  7.0   --   7.5   ALBUMIN  3.5  3.9  3.6  4.0   < >  3.8*    < > = values in this interval not displayed.       Iron Panel -   Recent Labs   Lab Test  06/22/17   1103  06/02/17   1213  05/22/17   1531   IRON  50  69  85   IRONSAT  15  20  25   WILL  199   --   383         Imaging:  EXAMINATION: CT ABDOMEN PELVIS W/O CONTRAST, 1/8/2018 5:04 PM     TECHNIQUE:  Helical CT images from the lung bases through the pubic  symphysis were obtained without IV contrast.      COMPARISON: 11/27/2017 radiographs     HISTORY: RLQ pain, recent laparoscopic surgery for repositioning of  peritoneal dialysis catheter, concern for possible appendicitis;      FINDINGS:     Abdomen and pelvis: Evaluation is limited due to absence of  intravenous contrast injection. Ovoid low-attenuation region in the  posterior left hepatic lobe measuring 3.2 x 2.7 cm (series 5, image  93). Additional smaller, similar-appearing focus in the hepatic dome  (series 5, image 55). The liver is otherwise unremarkable. No intra or  extrahepatic biliary dilation. The gallbladder is within normal  limits. The spleen, pancreas, and adrenal glands are unremarkable.  Mildly atrophic bilateral native kidneys. Bilateral exophytic rounded  renal cortical hypodensities, the  largest of which measure simple  fluid density, compatible with cysts. Several of these hypodensities  are too small to fully characterize but are favored to represent cysts  as well. No hydronephrosis, hydroureter, or urinary tract stone. The  bladder is within normal limits. Mildly enlarged prostate. Symmetric  seminal vesicles. Pelvic phleboliths. Small amount of free fluid  adjacent to a peritoneal dialysis catheter tip coiled in the right  lower quadrant adjacent to the appendix which demonstrates mild  distention and wall thickening at its midsegment with mild surrounding  fat stranding (series 5, image 300). Several foci of free  intraperitoneal air noted within the upper abdomen about the liver. No  bowel obstruction. No abdominal or pelvic lymphadenopathy.     Lung bases:  Scattered sub-3 mm bilateral pulmonary nodules in the  lower lobes, for example series 5 image 33; series 5 image 51 in the  right lower lobe, series 5 image 53 in the left lower lobe. No acute  airspace opacities. Mild bilateral lower lobes dependent atelectasis.  No pleural effusions.     Bones and soft tissues: Hemangioma in the L3 and L4 vertebral bodies.  5 lumbar vertebral bodies were considered for the purposes of this  dictation with lumbarization of S1. Bilateral chronic pars  interarticularis defects at L5 with associated grade 1/2  anterolisthesis and moderate to severe intervertebral disc space  narrowing. Benign-appearing sclerotic focus in the proximal right  femur intertrochanteric region. No acute or aggressive osseous lesion.         IMPRESSION:   1. Mild inflammatory changes of the appendix which is surrounded by  the coiled tip of a peritoneal dialysis catheter, findings concerning  for acute appendicitis.  2. Several foci of free air in the upper abdomen, likely related to  recent peritoneal dialysis catheter repositioning.  3. Hypodensities in the liver are indeterminate on this noncontrast  CT. Consider initial  "further evaluation with ultrasound on a  nonemergent basis or comparison with outside imaging if available.  4. Bilateral lower lobes pulmonary nodules measuring up to 3 mm. In a  low risk patient no further workup is warranted. If the patient is  high risk consider follow-up with chest CT in 12 months.     [Urgent Result: Findings concerning for acute appendicitis]     Finding was identified on 1/8/2018 5:10 PM.      Dr. Rendon was contacted by Dr. Douglass at 1/8/2018 5:21 PM and  verbalized understanding of the urgent finding.     [Consider Follow Up: Pulmonary nodules]     This report will be copied to the [facility] to ensure a  provider acknowledges the finding.          I have personally reviewed the examination and initial interpretation  and I agree with the findings.     ZORAIDA GRUBBS MD             Current Medications:    [Auto Hold] NEPHRO-OLI RX  1 tablet Oral Daily     [Auto Hold] calcium acetate  2,001 mg Oral TID w/meals     [Auto Hold] diltiazem  180 mg Oral Daily     [Auto Hold] ertapenem (INVanz) IV  500 mg Intravenous Q24H     sodium chloride 0.9%  250 mL Intravenous Once in dialysis     sodium chloride 0.9%  1,000-2,000 mL Hemodialysis Machine Once     gelatin absorbable  1 each Topical During Hemodialysis (from stock)     - MEDICATION INSTRUCTIONS -   Does not apply Once     sodium chloride (PF)  3 mL Intracatheter During Hemodialysis (from stock)     sodium chloride (PF)  3 mL Intracatheter During Hemodialysis (from stock)     [Auto Hold] sodium chloride (PF)  3 mL Intracatheter Q8H       NaCl Stopped (01/09/18 1410)     lactated ringers       bupivacaine liposome (EXPAREL) LONG ACTING injection was administered into the infiltration site to produce postsurgical analgesia. Duration of action is up to 72 hours, and other \"jason\" medications should not be given for 96 hours with the exception of the lidocaine 5% patch (LIDODERM) and the lidocaine 10mg in potassium infusions. This " entry is for INFORMATION ONLY.       Mira Schmidt, NP

## 2018-01-09 NOTE — PROCEDURES
Interventional Radiology Brief Post Procedure Note    Procedure: IR CVC TUNNEL PLACEMENT > 5 YRS OF AGE    Proceduralist: Basilio Bolivar PA-C    Assistant: None    Time Out: Prior to the start of the procedure and with procedural staff participation, I verbally confirmed the patient s identity using two indicators, relevant allergies, that the procedure was appropriate and matched the consent or emergent situation, and that the correct equipment/implants were available. Immediately prior to starting the procedure I conducted the Time Out with the procedural staff and re-confirmed the patient s name, procedure, and site/side. (The Joint Commission universal protocol was followed.)  Yes    Medications   Medication Event Details Admin User Admin Time       Sedation: General Endotracheal Anesthesia (GET) administered and documented by Anesthesia Care Provider    Findings: Completed image-guided placement of 14.5 Arabic, 19 cm double lumen tunneled central venous catheter via right IJ. Aspirates and flushes freely, heparin locked and ready for immediate use. Tip in high right atrium.      Estimated Blood Loss: Less than 10 ml    Fluoroscopy Time: 0.6 minute(s)    SPECIMENS: None    Complications: 1. None     Condition: Stable    Plan: Follow-up per primary team.     Comments: See dictated procedure note for full details.    Basilio Bolivar PA-C

## 2018-01-09 NOTE — PROGRESS NOTES
Received report from 6C RN Adelina, who stated that the patient was completing his antwan wipe 'bath' at this time. Transport will be sent to retrieve patient.

## 2018-01-09 NOTE — CONSULTS
Interventional Radiology  Consult:     Patient:  Cesar Villalobos   Date of birth 1965, Medical record number [id number]    Consult Requester:Janene Justice*  Reason for consult: Tunneled line dialysis catheter placement     52 yr old male with ESRD, on peritoneal dialysis who presented to the ED with RLQ pain. Apparently the catheter has been replaced recently and he currently has extreme pain at the area of peritoneal catheter.     He has hyperkalemia with EKG changes. Team was counseled to initiate K shifting medications and perform non tunneled dialysis if needed, as tunneled line placement under sedation would not deem to be safe with his K levels.     The patient will be discussed in the morning with plan regarding tunneled line in the morning.       Please contact the IR charge RN at [phone] for estimated time of procedure.     Case discussed with Dr. Brian.       Garcia Karimi MD MPH  Vascular Interventional Radiology Fellow  [phone] ([facility] IR call pager)  [phone] ([facility] IR daytime pager)

## 2018-01-09 NOTE — OP NOTE
[facility], [place]    Operative Note    Pre-operative diagnosis: Acute Appendicitis   Post-operative diagnosis Acute phlegmonous appendicitis     Procedure: Procedure(s):  Laparoscopic Appendectomy  - Wound Class: I-Clean   - Wound Class: I-Clean   Surgeon: Surgeon(s) and Role:     * Caden Tay MD - Primary     * Basilio Bolivar PA-C - Resident - Assisting     * Antonio Edge MD - Assisting       Jey Nogueira, resident        Eliecer, student   Anesthesia: General    Estimated blood loss: Less than 10 ml   Drains: None   Specimens:   ID Type Source Tests Collected by Time Destination   1 : Peritoneal Fluid from Peritoneal Catheter Fluid Peritoneum ANAEROBIC BACTERIAL CULTURE, FLUID CULTURE AEROBIC BACTERIAL, FUNGUS CULTURE, GRAM STAIN Antonio Edge MD 1/9/2018 12:46 PM    A : Appendix Organ Appendix SURGICAL PATHOLOGY EXAM Caden Tay MD 1/9/2018  1:30 PM       Findings: None.   The appendix was edematous, congested  inflamed.  It was not perforated. The peritoneal catheter was lying over the appendix. There was minimal sero-sanguinous fluid near the appendix.   Complications: None.   Implants: None.         OPERATIVE INDICATIONS:  Cesar Villalobos is a 52 year old-year-old male with a history of right lower quadrant abdominal pain. The pain started yesterday during peritoneal dialysis. He has been sent to ER and we were asked to evaluate the abdominal pain.  The white blood cell count was 9.4 thousand per microliter.  The urinalysis was showing few bacteria.  A few days prior we had repositioned a PD catheter that was wrapped in the appendix.    CT scan of the abdomen and pelvis showed signs of appendicitis.    After understanding the risks and benefits of proceeding with surgery, the patient has an indication for laparoscopic appendectomy and consented to undergo surgery.      OPERATIVE DETAILS:  The patient was brought to the operating room and prepared in a routine  fashion.  A timeout was performed prior to surgery and documented by the nursing team.    Under the benefits of general anesthesia, the peritoneal catheter was used and pneumoperitoneum was established using carbon dioxide gas to a maximum pressure of 15 mmHg.  A total of 3 ports were placed and the 5 mm laparoscope was utilized.    The patient was moved into a position with the right side up.    The cecum was identified and the appendix was identified at the confluence of the tenia coli.    The appendix was elevated.  Lysis of adhesions was performed to mobilize the appendix.    A window was made at the base of the appendix in the mesoappendix.  A 45 load endo-CHELSI stapler was then used to divide the appendix at its base and a short stump was left.    Next, the appendix was elevated and the mesoappendix was divided using a  Harmonic scalpel.    The appendix was placed into an endocatch bag and removed from the 12mm port.    The area of the appendix was irrigated with saline.    Hemostasis was confirmed.      The 12mm incision was closed using a single 0 Vicryl suture.    Next we did re-anchor the peritoneal dialysis catheter lower in the pelvis with and endo-close stitch.    All skin incisions were closed using 4-0 monocryl suture and dermabond was applied.    I was present for all critical components of the operation, and all needle and sponge counts were correct x2 at the end of the procedure.    ATTESTATION:    I was present during the key portions of the procedure, and I was immediately available for the entire procedure between opening and closing.    Caden Tay MD, PhD  Assistant Professor of Surgery

## 2018-01-09 NOTE — PLAN OF CARE
Problem: Patient Care Overview  Goal: Plan of Care/Patient Progress Review  Transfer  Transferred to: OR  Via: litter  Reason for transfer: Patient will get a tunneled karma placed for dialysis and a Lp appendectomy.  Family: Aware of transfer  Belongings: in his room and all bags were labeled. He has a wallet and cell phone in his back back. Belongings will be sent to the unit he will be going to if he does not come back to unit 6C  Chart: Sent with pt  Medications: He received his Ertapenem antibiotic before he left/  Report called to: the receiving RN in 3C. Patient left unit 6C at 10 :35

## 2018-01-09 NOTE — H&P
[facility] Surgery Consultation    Cesar Villalobos MRN# [id number]   Age: 52 year old YOB: 1965     Date of Admission:  1/8/2018            Assessment and Plan:   Assessment:   51 yo male with ESRD, on PD, s/p repositioning of PD catheter on the 5th, presents with worsening abdominal pain with dialysis, and hyperkalemia (responded to medical managemnet), CT scan consistent with appendicitis.         Plan:   - Admit to kidney txp  - NPO, MIVF @ 50 cc/hr  - Continue home meds, hold lisinopril  - recheck BMP in AM   - Lap appy in AM and tunneled dialysis line placement, pre-op orders placed, to be consented in AM    Discussed with fellow Dr. Edge             Chief Complaint:   Abdominal pain          History of Present Illness:   51 yo male with history of ESRD 2/2 HTN, on PD since June 2017, s/p repositioning of catheter on the 5th of this month for non functioning catheter, at the time his catheter was noted to be stuck to his appendix, the catheter was tacked in the pelvis, he returns today with PD intolerance with worsening abdominal pain, mainly in the RLQ, denies fever, chills, diarrhea, nausea/emesis, las BM was today. He last dialyzed on Friday night, he was not able to tolerate withdrawing fluid, therefore he missed dialysis Saturday through Monday, he does make about 1.4-1.6 L of urine per day. In the ED he was found to be hyperkalemic to 5.9, EKG was normal, he responded to medical managemnet, a tunneled catheter was not placed per IR due to the hyperkalemia, repeat k was 4.7. A CT scan was obtained concerning for acute appendicitis, with catheter in the RLQ adjacent to the appendix, his WBC WNL.         Past Medical History:     Past Medical History:   Diagnosis Date     Anemia in chronic kidney disease      CKD (chronic kidney disease), stage IV (H)      Dyslipidemia      Hypertension              Past Surgical History:     Past Surgical History:   Procedure Laterality Date      HERNIA REPAIR, INGUINAL RT/LT Left     as child     LAPAROSCOPIC INSERTION CATHETER PERITONEAL DIALYSIS N/A 6/6/2017    Procedure: LAPAROSCOPIC INSERTION CATHETER PERITONEAL DIALYSIS;  Laparoscopic Peritoneal Dialysis Catheter Placement. ;  Surgeon: Caden Tay MD;  Location: [facility] OR     LAPAROSCOPIC INSERTION CATHETER PERITONEAL DIALYSIS N/A 1/5/2018    Procedure: LAPAROSCOPIC INSERTION CATHETER PERITONEAL DIALYSIS;  Laparoscopic Repositioning of Peritoneal Dialysis Catheter.;  Surgeon: Caden Tay MD;  Location: [facility] OR             Social History:     Social History   Substance Use Topics     Smoking status: Never Smoker     Smokeless tobacco: Never Used     Alcohol use No             Family History:     Family History   Problem Relation Age of Onset     CANCER Brother      KIDNEY DISEASE Brother      due to XRT     HEART DISEASE Sister                 Allergies:     Allergies   Allergen Reactions     Nsaids      Swelling and Hives               Medications:     Current Facility-Administered Medications   Medication     naloxone (NARCAN) injection 0.1-0.4 mg     acetaminophen (TYLENOL) tablet 650 mg     HYDROmorphone (DILAUDID) injection 0.2 mg     ondansetron (ZOFRAN-ODT) ODT tab 4 mg    Or     ondansetron (ZOFRAN) injection 4 mg     lidocaine 1 % 1 mL     lidocaine (LMX4) kit     sodium chloride (PF) 0.9% PF flush 3 mL     [START ON 1/9/2018] sodium chloride (PF) 0.9% PF flush 3 mL               Review of Systems:   C: NEGATIVE for fever, chills, change in weight  E/M: NEGATIVE for ear, mouth and throat problems  R: NEGATIVE for significant cough or SOB  CV: NEGATIVE for chest pain, palpitations or peripheral edema          Physical Exam:   All vitals have been reviewed  Temp:  [98.2  F (36.8  C)-98.6  F (37  C)] 98.2  F (36.8  C)  Pulse:  [79] 79  Heart Rate:  [65-98] 85  Resp:  [12-26] 16  BP: ()/(63-90) 109/77  SpO2:  [91 %-100 %] 97 %  No intake or output data in the 24 hours ending  01/08/18 2356  Physical Exam:  Alert and oriented x3  Non labored breathing   Non cyanotic  Soft abdomen, minimal distension, tender at the RLQ, with tenderness to percussion, no signs of generalized peritonitis  Warm BLE, no edema            Data:   All laboratory data reviewed    Results:  BMP  Recent Labs  Lab 01/08/18  2154 01/08/18  1919 01/08/18  1503 01/05/18  0706   NA  --   --  141  --    POTASSIUM 4.7 5.8* 5.9* 5.2   CHLORIDE  --   --  111*  --    CO2  --   --  16*  --    BUN  --   --  125*  --    CR  --   --  11.60* 10.50*   GLC  --   --  84 94     CBC  Recent Labs  Lab 01/08/18  1503 01/05/18  0706   WBC 6.6 6.3   HGB 9.4* 10.5*    220     LFT  Recent Labs  Lab 01/08/18  2009 01/05/18  0706   INR 0.94 0.88       Recent Labs  Lab 01/08/18  2242 01/08/18  2158 01/08/18  2138 01/08/18  2119 01/08/18  1503 01/05/18  0706 01/05/18  0648   GLC  --   --   --   --  84 94  --    * 152* 133* 45*  --   --  101*       Imaging:  CT abd/pelvis:     IMPRESSION:   1. Mild inflammatory changes of the appendix which is surrounded by  the coiled tip of a peritoneal dialysis catheter, findings concerning  for acute appendicitis.  2. Several foci of free air in the upper abdomen, likely related to  recent peritoneal dialysis catheter repositioning.  3. Hypodensities in the liver are indeterminate on this noncontrast  CT. Consider initial further evaluation with ultrasound on a  nonemergent basis or comparison with outside imaging if available.  4. Bilateral lower lobes pulmonary nodules measuring up to 3 mm. In a  low risk patient no further workup is warranted. If the patient is  high risk consider follow-up with chest CT in 12 months.     [Urgent Result: Findings concerning for acute appendicitis]     Finding was identified on 1/8/2018 5:10 PM.      Dr. Rendon was contacted by Dr. Douglass at 1/8/2018 5:21 PM and  verbalized understanding of the urgent finding.     [Consider Follow Up: Pulmonary nodules]     This  report will be copied to the [facility] to ensure a  provider acknowledges the finding.          I have personally reviewed the examination and initial interpretation  and I agree with the findings.     MD Megha BURRELL MD

## 2018-01-09 NOTE — PROGRESS NOTES
Focus:  Admission  Diagnosis:   Admitted from: [facility]   Via: stretcher   Accompanied by: self.  Belongings: Pt declined putting anything in hospital safe, belongings at bedside, laptop, glasses, cell phone,  and backpack, jacket and clothing  Admission paperwork: complete.   Teaching: Call don't fall, use of console, meal times, visiting hours, orientation to unit, when to call for the RN (angina/sob/dizzyness, etc.), and stressed the importance of safety.   Access: PIV   Telemetry: Placed on pt   Ht./Wt.: complete

## 2018-01-09 NOTE — ANESTHESIA CARE TRANSFER NOTE
Patient: Cesar Villalobos    Procedure(s):  Laparoscopic Appendectomy  - Wound Class: I-Clean   - Wound Class: I-Clean    Diagnosis: Acute Appendicitis  Diagnosis Additional Information: No value filed.    Anesthesia Type:   General, ETT     Note:  Airway :Face Mask  Patient transferred to:PACU  Comments: Pt extubated without incident or complications. Pt VSS upon arrival to the PACU. Pt has no c/o pain/N/V. Pt care report given to receiving RN.       Vitals: (Last set prior to Anesthesia Care Transfer)    CRNA VITALS  1/9/2018 1336 - 1/9/2018 1409      1/9/2018             Resp Rate (set): 10                Electronically Signed By: RUPA Sanchez CRNA  January 9, 2018  2:09 PM

## 2018-01-09 NOTE — PROGRESS NOTES
Care Coordinator Progress Note     Admission Date/Time:  1/8/2018  Attending MD:  Caden Tay MD     Data  Chart reviewed, discussed with interdisciplinary team.   Patient was admitted for:    Hyperkalemia  Acute appendicitis with localized peritonitis.    Concerns with insurance coverage for discharge needs: None.  Current Living Situation: Patient lives with adult .  Support System: Supportive and Involved SO, Rosita Rankin.   Services Involved: [facility]  Transportation: Family or Friend will provide  Barriers to Discharge: medical condition.     Coordination of Care and Referrals: Provided patient/family with options for outpt dialysis.        Assessment  Per RN, pt to have tunneled karma catheter placed for HD and a Lap appendectomy today.   Per Renal note, pt has appendicitis now and after surgery he needs several weeks for it to recover before using his PD cathter, will change to iHD. Pt said that he would like to have outpt HD at [facility] on T-Th-Sat. This writer spoke with Roxie at outpt [facility] (Ph: [phone] Fax: [phone]) and she said that T-Th-Sat at 1:45 PM is available; pt is in agreement with this schedule. Roxie added that she will contact pt's Nephrologist, Dr. Merline Muñiz.     Plan  Anticipated Discharge Date:  TBD  Outpt dialysis set up needs to be completed prior to pt's discharge.   Anticipated Discharge Plan:   TBD  CC will continue to monitor pt's medical condition and progress toward discharge.   AMAURY WOOD RN BSN  Care Coordinator  [phone]

## 2018-01-09 NOTE — CONSULTS
Patient is on IR schedule 1/9/2018 for a Tunneled CVC placement for dialysis.   Labs WNL for procedure.   Pt is NPO for OR procedure.  Consent will be done prior to procedure.     Please contact the IR charge RN at [phone] for estimated time of procedure.     Case discussed with Dr. Bender from IR and RUPA Pérez. Line to be placed in the OR after surgery.    Rosalee Vera DNP, APRN  Interventional Radiology  Phone: [phone]  Pager: [phone]

## 2018-01-10 ENCOUNTER — TELEPHONE (OUTPATIENT)
Dept: FAMILY MEDICINE | Facility: CLINIC | Age: 53
End: 2018-01-10

## 2018-01-10 ENCOUNTER — TELEPHONE (OUTPATIENT)
Dept: TRANSPLANT | Facility: CLINIC | Age: 53
End: 2018-01-10

## 2018-01-10 VITALS
RESPIRATION RATE: 16 BRPM | WEIGHT: 203.71 LBS | HEART RATE: 72 BPM | SYSTOLIC BLOOD PRESSURE: 122 MMHG | DIASTOLIC BLOOD PRESSURE: 73 MMHG | OXYGEN SATURATION: 97 % | TEMPERATURE: 98 F | BODY MASS INDEX: 26.14 KG/M2 | HEIGHT: 74 IN

## 2018-01-10 PROBLEM — R91.8 PULMONARY NODULES: Status: ACTIVE | Noted: 2018-01-10

## 2018-01-10 PROBLEM — K76.9 LESION OF LIVER: Status: ACTIVE | Noted: 2018-01-10

## 2018-01-10 LAB
ANION GAP SERPL CALCULATED.3IONS-SCNC: 13 MMOL/L (ref 3–14)
BUN SERPL-MCNC: 76 MG/DL (ref 7–30)
CALCIUM SERPL-MCNC: 8.9 MG/DL (ref 8.5–10.1)
CHLORIDE SERPL-SCNC: 106 MMOL/L (ref 94–109)
CO2 SERPL-SCNC: 23 MMOL/L (ref 20–32)
CREAT SERPL-MCNC: 8.99 MG/DL (ref 0.66–1.25)
GFR SERPL CREATININE-BSD FRML MDRD: 6 ML/MIN/1.7M2
GLUCOSE BLDC GLUCOMTR-MCNC: 93 MG/DL (ref 70–99)
GLUCOSE SERPL-MCNC: 135 MG/DL (ref 70–99)
POTASSIUM SERPL-SCNC: 4.8 MMOL/L (ref 3.4–5.3)
SODIUM SERPL-SCNC: 142 MMOL/L (ref 133–144)

## 2018-01-10 PROCEDURE — 25000132 ZZH RX MED GY IP 250 OP 250 PS 637: Performed by: STUDENT IN AN ORGANIZED HEALTH CARE EDUCATION/TRAINING PROGRAM

## 2018-01-10 PROCEDURE — 25000128 H RX IP 250 OP 636

## 2018-01-10 PROCEDURE — 90937 HEMODIALYSIS REPEATED EVAL: CPT

## 2018-01-10 PROCEDURE — 27210995 ZZH RX 272: Performed by: TRANSPLANT SURGERY

## 2018-01-10 PROCEDURE — 80048 BASIC METABOLIC PNL TOTAL CA: CPT | Performed by: TRANSPLANT SURGERY

## 2018-01-10 PROCEDURE — 36415 COLL VENOUS BLD VENIPUNCTURE: CPT | Performed by: TRANSPLANT SURGERY

## 2018-01-10 PROCEDURE — 00000146 ZZHCL STATISTIC GLUCOSE BY METER IP

## 2018-01-10 PROCEDURE — 25000128 H RX IP 250 OP 636: Performed by: TRANSPLANT SURGERY

## 2018-01-10 RX ORDER — ACETAMINOPHEN 325 MG/1
650 TABLET ORAL EVERY 4 HOURS PRN
Start: 2018-01-10 | End: 2018-03-06

## 2018-01-10 RX ORDER — LEVOFLOXACIN 250 MG/1
250 TABLET, FILM COATED ORAL
Qty: 2 TABLET | Refills: 0 | Status: SHIPPED | OUTPATIENT
Start: 2018-01-10 | End: 2018-03-06

## 2018-01-10 RX ORDER — OXYCODONE HYDROCHLORIDE 5 MG/1
5 TABLET ORAL EVERY 6 HOURS PRN
Qty: 15 TABLET | Refills: 0 | Status: SHIPPED | OUTPATIENT
Start: 2018-01-10 | End: 2018-03-06

## 2018-01-10 RX ADMIN — Medication 1 CAPSULE: at 07:59

## 2018-01-10 RX ADMIN — CALCIUM ACETATE 2001 MG: 667 CAPSULE ORAL at 11:31

## 2018-01-10 RX ADMIN — ATORVASTATIN CALCIUM 10 MG: 10 TABLET, FILM COATED ORAL at 07:59

## 2018-01-10 RX ADMIN — CALCIUM ACETATE 2001 MG: 667 CAPSULE ORAL at 07:58

## 2018-01-10 RX ADMIN — B-COMPLEX W/ C & FOLIC ACID TAB 1 MG 1 TABLET: 1 TAB at 07:58

## 2018-01-10 RX ADMIN — GENTAMICIN SULFATE: 1 CREAM TOPICAL at 07:59

## 2018-01-10 RX ADMIN — WATER 500 MG: 1 INJECTION INTRAMUSCULAR; INTRAVENOUS; SUBCUTANEOUS at 09:31

## 2018-01-10 RX ADMIN — OXYCODONE HYDROCHLORIDE 5 MG: 5 TABLET ORAL at 05:04

## 2018-01-10 RX ADMIN — SENNOSIDES 1 TABLET: 8.6 TABLET, FILM COATED ORAL at 07:58

## 2018-01-10 RX ADMIN — SODIUM CHLORIDE 250 ML: 9 INJECTION, SOLUTION INTRAVENOUS at 12:27

## 2018-01-10 RX ADMIN — Medication: at 12:28

## 2018-01-10 RX ADMIN — ACETAMINOPHEN 650 MG: 325 TABLET, FILM COATED ORAL at 07:58

## 2018-01-10 RX ADMIN — SODIUM CHLORIDE 1000 ML: 9 INJECTION, SOLUTION INTRAVENOUS at 12:27

## 2018-01-10 RX ADMIN — DILTIAZEM HYDROCHLORIDE 180 MG: 180 CAPSULE, COATED, EXTENDED RELEASE ORAL at 07:58

## 2018-01-10 RX ADMIN — SODIUM BICARBONATE 1300 MG: 650 TABLET ORAL at 07:58

## 2018-01-10 ASSESSMENT — PAIN DESCRIPTION - DESCRIPTORS
DESCRIPTORS: SORE
DESCRIPTORS: SORE;ACHING

## 2018-01-10 NOTE — PROGRESS NOTES
Care Coordinator  D: Per Margo Elam,NP, pt will d/c later today after HD and begin Op HD.  I: This writer spoke with Louie at [facility] (Ph: [phone] Fax: [phone]) and he said that T-Th-Sat at 1:45 PM--can begin 1/11/18 and be there at 1:20pm.   A: d/c today  P: Per Louie, they need dialysis orders fax'd-- I will inform Aubree Schmidt ([phone])--Aubree will fax orders today.

## 2018-01-10 NOTE — PROGRESS NOTES
HEMODIALYSIS TREATMENT NOTE    Date: 1/10/2018  Time: 4:12 PM    Data:  Pre Wt: 92.4 kg (203 lb 11.3 oz)   Desired Wt: 92.4 kg   Post Wt: 92.4 kg (203 lb 11.3 oz)  Weight gain: 0 kg   Weight change: 0 kg  Ultrafiltration - Post Run Net Total Removed (mL): 0 mL  Ultrafiltration - Post Run Net Total Gain (mL): 0 mL  Vascular Access Status: Yes, secured and visible  Dialyzer Rinse: Streaked, Moderate  Total Blood Volume Processed: 78  Total Dialysis (Treatment) Time:      Lab:       Interventions:  3.5 hours of HD with no fluids pulled.  on run. Tolerated tx with no complications. Hand off to RN    Assessment:  PATRICIA patient in for HD run 2/2 chemistries only. Making Urine VSS A+O     Plan:    Per renal

## 2018-01-10 NOTE — PLAN OF CARE
"Problem: Patient Care Overview  Goal: Plan of Care/Patient Progress Review  Outcome: Adequate for Discharge Date Met: 01/10/18  /73 (BP Location: Right arm)  Pulse 72  Temp 98  F (36.7  C) (Oral)  Resp 16  Ht 1.88 m (6' 2\")  Wt 92.4 kg (203 lb 11.3 oz)  SpO2 97%  BMI 26.15 kg/m2     5323-6046: Afebrile, 90-110s/60-70s, OVSS on RA. Patient reporting soreness/discomfort in abdomen around incisions, PRN tylenol x1 with adequate control. Patient declines further intervention. Denies nausea. Tolerating regular diet. Loose BM x1 reported by patient this evening. Steven removed at 0815 with 2 PVR checks <300 mL. Per Dr. Nogueira, patient okay to leave without Steven catheter replaced. R chest Ziggy CDI. Caring for Your Tunneled Catheter document reviewed with patient and shower supplies provided. L PIV removed prior to DC. Incisions liquid bandaged and CDI. LLQ PD catheter CDI. Up independently, walked around 7th floor x1 today. AVS reviewed with patient who states understanding of instructions. Will DC to home with outpatient HD at [facility].        "

## 2018-01-10 NOTE — DISCHARGE SUMMARY
"Dialysis Discharge Summary Brief    [facility]  Division of Nephrology  Nephrology Discharge Dialysis Orders  Ph: [phone]  Fax: [phone]    Cesar Villalobos  MRN: [id number]  YOB: 1965    [facility] Dialysis Unit: [facility]  Primary Nephrologist: Mary Kay    Date of Admission: 1/8/2018  Date of Discharge: 1/10/18    Nephrology Discharge Summary:     Mr Villalobos is a 51 yo male with ESRD on PD since 6/17, on Renal transplant list since 2014, admitted on 1/8/17 for appendicitis. Just had PD catheter repositioned on 1/5 because it was coiled around the appendix. PD catheter again found to be coiled around his appendix with inflammation. He was unable to perform exchanges following procedure on 1/5 due to pain. Last full PD treatment was 1/4. Was hyperkalemic upon admission with K 5.9, corrected to 5.2 this AM. He remains non oliguric       Assessment & Recommendations:       1. ESRD - Updated Dr Muñiz who is patient's primary Nephrologist today. PD care managed through the [facility] HD unit.    - Will have to hold on HD until cleared by Surgeon, likely several weeks. Has f/u appt mid January   - PD catheter was \" re anchored lower in the pelvis with endo close stitch\"   - In interim will have IHD.    - Tunneled HD catheter placed 1/9/18   - Had short run on 1/9 and 3.5 hr run today   - Patient will begin OP HD tomorrow at [facility]. He will follow a TTS schedule.    - Renal dose medications   - Avoid transfusions given transplant status      2. Volume status - Euvolemic. No edema/dyspnea/hypoxia. UO ~ 2000 cc last 24 hrs. EDW 95 kg. Pre run weight today 92.4 kg. SBP teens - 120/.    - No fluid off today   - Continue daily weights/I/O      3. Electrolytes - No acute concerns. Pre run K 4.8 today.       4. Acid base - No acute concerns. Bicarb 23      5. BMD - Ca 8.8. Phos likely elevated given no RRT for 5 dys.    - Resume Phoslo      6. Anemia - Hgb 9.4.    - Had " been receiving Aranesp monthly   - Will defer to OP HD unit    - Avoid transfusions given transplant status      7. Appendicitis, s/p lap appe - POD #1. On Ertapenem. WBC 6.6. Afebrile.       8. HTN - Well controlled. Currently on Diltiazem 180 mg qd. Holding ACE given hyperkalemia upon admission. SBP's running on the low side today and he is below his EDW.    - Hold both ACE and Diltiazem upon discharge and reassess on OP HD unit for resumption.     PROCEDURE 1/9/18    PRE-PROCEDURE DIAGNOSIS: End-stage renal disease      POST-PROCEDURE DIAGNOSIS: Same      PROCEDURE: Tunneled central venous catheter placement      IMPRESSION: Completed image-guided placement of 14.5 Congolese, 19 cm  double lumen tunneled central venous catheter via right internal  jugular vein. Catheter tip in right atrium. Aspirates and flushes  freely, heparin locked and ready for immediate use. No complication.       ----------      CLINICAL HISTORY: Patient requires central venous access for therapy.  Tunneled central venous catheter placement requested.      PERFORMED BY: Basilio Bolivar PA-C      CONSENT: Written informed consent was obtained and is documented in  the patient record.      SEDATION: General sedation with OR anesthesia. Patient's tunneled  central venous catheter was placed prior to an appendectomy in the  operating room.      MEDICATIONS: No 1% lidocaine was utilized under the direction of the  anesthesia team. The catheter was heparin locked upon completion of  placement.      FLUOROSCOPY TIME: 0.6 minutes      DESCRIPTION: The right neck and upper chest were prepped and draped in  the usual sterile fashion.        Under ultrasound guidance, the right internal jugular vein was  identified and a skin dermatotomy was made. Under ultrasound guidance,  right internal jugular venipuncture was made with needle. Image saved  documenting venipuncture and patency.      Needle was exchanged over guidewire for a dilator under  fluoroscopic  guidance. Length to right atrium was measured with guidewire.  Guidewire and inner dilator were removed. Wire was advanced into  inferior vena cava under fluoroscopic guidance and secured.       The anterior chest was evaluated and an incision was made after  measurements were taken. A cuffed catheter was subcutaneously tunneled  from the anterior chest incision to the internal jugular venipuncture  site after path of tunnel was anesthetized. The dilator was exchanged  over guidewire for a peel-away sheath. Guidewire was removed. Under  fluoroscopic guidance, the catheter was placed through the peel-away  sheath. Peel-away sheath was removed.        Final catheter position saved. Both catheter lumens adequately  aspirated and flushed. Each lumen was heparin locked. A catheter  retaining suture and sterile dressing were applied. The skin  dermatotomy site overlying the internal jugular venipuncture was  closed with topical adhesive.      COMPLICATIONS: No immediate concerns, the patient remained stable  throughout the procedure and tolerated it well.      ESTIMATED BLOOD LOSS: Minimal      SPECIMENS: None      KEVIN POP PA-C          [x] New initiation, new dialysis orders will be faxed.    New Orders (if not applicable put NA):  Estimated Dry Weight 95 kg   Dialysis Duration 2.5 hrs on 1/11/18, then begin 3.5 hrs on 1/13/18   Dialysis Access TDC   Antibiotics (dose per dialysis, end date) none           Labs to be drawn at dialysis Monthly ESRD labs per protocol   Other major changes to dialysis prescription (e.g. Dialysate bath, heparin, blood flow rate, etc)    ml/mn,  ml/mn, No heparin post op, Na 138, K 2, Ca 3.5, Bicarb 32, Temp 37, keep SBP > 110/   Medication changes (also fax the unit a copy of the discharge summary)         Check with Dr Muñiz regarding Aranesp dosing     Name of physician completing this form: Mira Schmidt NP   Pager [phone]

## 2018-01-10 NOTE — TELEPHONE ENCOUNTER
Noted  Will postpone this encounter for a day and see if d/c summary is complete  Thanks!     Cathie Hannon RN

## 2018-01-10 NOTE — TELEPHONE ENCOUNTER
Petra NP called from [facility] to inform you patient is being discharged today he had a Hepatectomy     Petra states patient has been temporarily switched to hemo dialysis due to the Hepatectomy    Petra also reports they found a few other things on CT scan that will need a follow up on...      Hypo densities in liver so need a ultrasound F/U & very small pulmonary modules

## 2018-01-10 NOTE — TELEPHONE ENCOUNTER
Would usually do a hospital follow up, but I assume you are asking me because you aren't sure whether the post-op follow up he has is going to follow up on these other issues?  Would go with whatever follow up is recommended in the d/c summary, once it is available

## 2018-01-10 NOTE — LETTER
January 10, 2018    Cesar Villalobos  [address] [place] [address]      Dear Mr. Villalobos,    This letter is sent to notify you that your listing status was changed from Active to Inactive on the kidney transplant waitlist at the [facility].  Your status was changed because during your admission for appendectomy pulmonary nodules and liver lesions were noted on CT.     During this waiting period, we may still request periodic laboratory tests with your primary physician.  It will be your responsibility to remind your physician to forward your results to the Transplant Office.    We still need to be kept informed of any changes such as:    o changes in your insurance coverage  o change in your phone number, address or emergency contact  o significant changes in your health  o significant infections (such as pneumonia or abscesses)  o blood transfusions  o any condition which requires hospitalization or surgery    Sincerely,        Kidney Transplant Program    Enclosures: Telephone Contact List, Travel Resources, UNOS Letter, Waitlist Information Update and While You Are Waiting    CC: Sallie Olsen MD (PCP), Asya Perez MD [facility] Tip

## 2018-01-10 NOTE — PROGRESS NOTES
"Transplant Surgery  Inpatient Daily Progress Note  01/10/2018    Assessment & Plan: D/c today after dialysis    __________________________________________________________________    Interval History: History is obtained from the patient  Overnight events: Pain well controlled, no nausea    ROS:   A 10-point review of systems was negative except as noted above.    Meds:    gelatin absorbable  1 each Topical During Hemodialysis (from stock)     sodium chloride (PF)  3 mL Intracatheter During Hemodialysis (from stock)     sodium chloride (PF)  3 mL Intracatheter During Hemodialysis (from stock)     NEPHRO-OLI RX  1 tablet Oral Daily     calcium acetate  2,001 mg Oral TID w/meals     diltiazem  180 mg Oral Daily     ertapenem (INVanz) IV  500 mg Intravenous Q24H     heparin Lock (1000 units/mL High concentration)  3 mL Intracatheter Once     heparin Lock (1000 units/mL High concentration)  3 mL Intracatheter Once     atorvastatin  10 mg Oral Daily     fish oil-omega-3 fatty acids  1 capsule Oral Daily     gentamicin   Topical Daily     melatonin tablet 2 mg  2 mg Oral At Bedtime     sennosides  1 tablet Oral Daily     sodium bicarbonate  1,300 mg Oral BID     sodium chloride (PF)  3 mL Intracatheter Q8H       Physical Exam:     Admit Weight: 96 kg (211 lb 11.2 oz)    Current vitals:   BP 98/69  Pulse 72  Temp 98.4  F (36.9  C) (Oral)  Resp 16  Ht 1.88 m (6' 2\")  Wt 92.4 kg (203 lb 11.3 oz)  SpO2 98%  BMI 26.15 kg/m2    Vital sign ranges:    Temp:  [97.5  F (36.4  C)-98.6  F (37  C)] 98.4  F (36.9  C)  Pulse:  [72-77] 72  Heart Rate:  [65-88] 79  Resp:  [10-16] 16  BP: ()/(62-90) 98/69  SpO2:  [91 %-100 %] 98 %  Patient Vitals for the past 24 hrs:   BP Temp Temp src Pulse Heart Rate Resp SpO2 Weight   01/10/18 1230 98/69 - - - 79 - 98 % -   01/10/18 1213 95/65 98.4  F (36.9  C) Oral - 75 16 99 % 92.4 kg (203 lb 11.3 oz)   01/10/18 1210 92/64 - - - 81 16 98 % -   01/10/18 1127 97/64 98.6  F (37  C) Oral - 80 " 14 97 % -   01/10/18 0745 116/73 98  F (36.7  C) Oral - 72 13 97 % 92.4 kg (203 lb 12.8 oz)   01/10/18 0500 130/88 98  F (36.7  C) Oral 72 - 16 100 % -   01/10/18 0000 129/90 - - 77 - 14 100 % -   01/09/18 1955 98/68 98.4  F (36.9  C) Oral 77 - 16 98 % -   01/09/18 1853 95/62 - - - 76 10 93 % -   01/09/18 1845 - - - - 79 12 95 % -   01/09/18 1830 94/63 - - - 80 14 95 % -   01/09/18 1815 91/65 - - - 81 14 95 % -   01/09/18 1800 99/65 - - - 75 14 96 % -   01/09/18 1745 108/66 - - - 76 14 96 % -   01/09/18 1730 122/69 - - - 85 16 97 % -   01/09/18 1715 113/79 - - - 88 16 96 % -   01/09/18 1700 110/76 - - - 76 16 97 % -   01/09/18 1645 101/65 - - - 77 16 97 % -   01/09/18 1630 112/66 - - - 70 14 99 % -   01/09/18 1615 126/84 - - - 70 16 99 % -   01/09/18 1610 127/79 - - - 75 14 91 % -   01/09/18 1600 122/82 97.6  F (36.4  C) Axillary - 72 14 95 % -   01/09/18 1530 112/69 - - - 67 - 96 % -   01/09/18 1515 115/71 97.7  F (36.5  C) Oral - 71 10 97 % -   01/09/18 1500 121/71 - - - 71 10 95 % -   01/09/18 1445 121/77 97.8  F (36.6  C) Oral - 65 11 97 % -   01/09/18 1430 118/71 - - - 68 11 97 % -   01/09/18 1415 126/85 97.5  F (36.4  C) Oral - 67 11 97 % -     General Appearance: in no apparent distress.   Skin: normal, warm, dry  Heart: Well perfused  Lungs: Unlabored, RA  Abdomen: The abdomen is soft, incisions CDI, PD cath covered  : No granados  Extremities: edema: None  Neurologic: A&Ox4, fine tremor, strength 5/5    Data:   CMP  Recent Labs  Lab 01/10/18  0752 01/09/18  2100  01/09/18  0544  01/08/18  1503     --   --  141  --  141   POTASSIUM 4.8 4.1  < > 5.2  < > 5.9*   CHLORIDE 106  --   --  108  --  111*   CO2 23  --   --  20  --  16*   *  --   --  91  --  84   BUN 76*  --   --  126*  --  125*   CR 8.99*  --   --  12.10*  --  11.60*   GFRESTIMATED 6*  --   --  4*  --  5*   GFRESTBLACK 7*  --   --  5*  --  6*   KAI 8.9  --   --  8.8  --  9.1   LIPASE  --   --   --   --   --  566*   < > = values in this  interval not displayed.  CBC    Recent Labs  Lab 01/08/18  1503 01/05/18  0706   HGB 9.4* 10.5*   WBC 6.6 6.3    220     COAGS    Recent Labs  Lab 01/08/18 2009 01/05/18  0706   INR 0.94 0.88   PTT 25 27      Urinalysis  Recent Labs   Lab Test  01/08/18   1449  10/14/17   1503  09/21/17   1107  06/22/17   1104   COLOR  Straw  Light Yellow   --    --    APPEARANCE  Clear  Clear   --    --    URINEGLC  30*  30*   --    --    URINEBILI  Negative  Negative   --    --    URINEKETONE  Negative  Negative   --    --    SG  1.007  1.006   --    --    UBLD  Trace*  Small*   --    --    URINEPH  6.0  7.0   --    --    PROTEIN  10*  10*   --    --    NITRITE  Negative  Negative   --    --    LEUKEST  Negative  Negative   --    --    RBCU  0  0   --    --    WBCU  <1  <1   --    --    UTPG   --    --   0.92*  0.77*

## 2018-01-10 NOTE — PROGRESS NOTES
"  Nephrology Progress Note  01/10/2018       Mr Villalobos is a 53 yo male with ESRD on PD since 6/17, on Renal transplant list since 2014, admitted on 1/8/17 for appendicitis. Just had PD catheter repositioned on 1/5 because it was coiled around the appendix. PD catheter again found to be coiled around his appendix with inflammation. He was unable to perform exchanges following procedure on 1/5 due to pain. Last full PD treatment was 1/4. Was hyperkalemic upon admission with K 5.9, corrected to 5.2 this AM. He remains non oliguric      Assessment & Recommendations:      1. ESRD - Updated Dr Muñiz who is patient's primary Nephrologist today. PD care managed through the [facility] HD unit.    - Will have to hold on HD until cleared by Surgeon, likely several weeks. Has f/u appt mid January   - PD catheter was \" re anchored lower in the pelvis with endo close stitch\"   - In interim will have IHD.    - Tunneled HD catheter placed 1/9/18   - Had short run on 1/9 and 3.5 hr run today   - Patient will begin OP HD tomorrow at [facility]. He will follow a TTS schedule.    - Renal dose medications   - Avoid transfusions given transplant status     2. Volume status - Euvolemic. No edema/dyspnea/hypoxia. UO ~ 2000 cc last 24 hrs. EDW 95 kg. Pre run weight today 92.4 kg. SBP teens - 120/.    - No fluid off today   - Continue daily weights/I/O     3. Electrolytes - No acute concerns. Pre run K 4.8 today.       4. Acid base - No acute concerns. Bicarb 23     5. BMD - Ca 8.8. Phos likely elevated given no RRT for 5 dys.    - Resume Phoslo     6. Anemia - Hgb 9.4.    - Had been receiving Aranesp monthly   - Will defer to OP HD unit    - Avoid transfusions given transplant status     7. Appendicitis, s/p lap appe - POD #1. On Ertapenem. WBC 6.6. Afebrile.      8. HTN - Well controlled. Currently on Diltiazem 180 mg qd. Holding ACE given hyperkalemia upon admission. SBP's running on the low side today and he is below his EDW.    - " "Hold both ACE and Diltiazem upon discharge and reassess on OP HD unit for resumption.         Recommendations were communicated to primary team directly     Seen and discussed with Dr. Wyman and Dr Mary Kay Schmidt, NP   [phone]     Interval History :      POD #1 for lap appe, repositioning of PD catheter and placement of TDC  Had a short HD run last evening cut short due to pending clotting of the system  Remains non oliguric  Will be discharging today  Interval progress notes, imaging studies and labs reviewed     Review of Systems:   I reviewed the following systems:  GI: Tolerating diet w/o nausea  Neuro:  no confusion  Constitutional:  no fever or chills  CV: denies dyspnea, CP or edema.    : Voiding w/o granados    Physical Exam:   I/O last 3 completed shifts:  In: 999.17 [P.O.:540; I.V.:359.17; Other:100]  Out: 2691 [Urine:2675; Blood:16]   /76  Pulse 72  Temp 98.4  F (36.9  C) (Oral)  Resp 16  Ht 1.88 m (6' 2\")  Wt 92.4 kg (203 lb 11.3 oz)  SpO2 98%  BMI 26.15 kg/m2     GENERAL APPEARANCE: NAD  EYES:  no scleral icterus, pupils equal  PULM: lungs CTA. Breathing is non labored. Not on supplemental oxygen.   CV: RRR       -edema- none  GI: soft, wearing abdominal binder.   INTEGUMENT: no cyanosis or rash  NEURO:  A/O   Access - Right TDC + PD catheter    Labs:   All labs reviewed by me  Electrolytes/Renal -   Recent Labs   Lab Test  01/10/18   0752  01/09/18   2100  01/09/18   1000  01/09/18   0544   01/08/18   1503   09/21/17   1004  06/22/17   1103   06/02/17   1213   NA  142   --    --   141   --   141   < >  138  139   < >  136   POTASSIUM  4.8  4.1  5.4*  5.2   < >  5.9*   < >  4.7  5.4*   < >  4.5   CHLORIDE  106   --    --   108   --   111*   < >  102  105   < >  97   CO2  23   --    --   20   --   16*   < >  25  20   < >  24   BUN  76*   --    --   126*   --   125*   < >  81*  101*   < >  99*   CR  8.99*   --    --   12.10*   --   11.60*   < >  9.94*  8.94*   < >  9.77* "   GLC  135*   --    --   91   --   84   < >  94  94   < >  152*   KAI  8.9   --    --   8.8   --   9.1   < >  9.3  8.8   < >  11.3*   MAG   --    --    --    --    --    --    --    --    --    --   2.7*   PHOS   --    --    --    --    --    --    --   7.1*  5.7*   --   6.4*    < > = values in this interval not displayed.       CBC -   Recent Labs   Lab Test  01/08/18   1503  01/05/18   0706  10/14/17   1455   WBC  6.6  6.3  8.3   HGB  9.4*  10.5*  11.1*   PLT  218  220  244       LFTs -   Recent Labs   Lab Test  09/21/17   1004  06/22/17   1103  06/06/17   0727  06/02/17   1213   09/18/14   0843   ALKPHOS   --    --   56  48   --   128   BILITOTAL   --    --   0.4  0.3   --   0.5   ALT   --    --   27  20   --   25   AST   --    --   15  14   --   14   PROTTOTAL   --    --   7.3  7.0   --   7.5   ALBUMIN  3.5  3.9  3.6  4.0   < >  3.8*    < > = values in this interval not displayed.       Iron Panel -   Recent Labs   Lab Test  06/22/17   1103  06/02/17   1213  05/22/17   1531   IRON  50  69  85   IRONSAT  15  20  25   WILL  199   --   383         Imaging:  PRE-PROCEDURE DIAGNOSIS: End-stage renal disease     POST-PROCEDURE DIAGNOSIS: Same     PROCEDURE: Tunneled central venous catheter placement      IMPRESSION: Completed image-guided placement of 14.5 Welsh, 19 cm  double lumen tunneled central venous catheter via right internal  jugular vein. Catheter tip in right atrium. Aspirates and flushes  freely, heparin locked and ready for immediate use. No complication.      ----------     CLINICAL HISTORY: Patient requires central venous access for therapy.  Tunneled central venous catheter placement requested.     PERFORMED BY: Basilio Bolivar PA-C     CONSENT: Written informed consent was obtained and is documented in  the patient record.     SEDATION: General sedation with OR anesthesia. Patient's tunneled  central venous catheter was placed prior to an appendectomy in the  operating room.     MEDICATIONS: No 1%  lidocaine was utilized under the direction of the  anesthesia team. The catheter was heparin locked upon completion of  placement.     FLUOROSCOPY TIME: 0.6 minutes     DESCRIPTION: The right neck and upper chest were prepped and draped in  the usual sterile fashion.       Under ultrasound guidance, the right internal jugular vein was  identified and a skin dermatotomy was made. Under ultrasound guidance,  right internal jugular venipuncture was made with needle. Image saved  documenting venipuncture and patency.     Needle was exchanged over guidewire for a dilator under fluoroscopic  guidance. Length to right atrium was measured with guidewire.  Guidewire and inner dilator were removed. Wire was advanced into  inferior vena cava under fluoroscopic guidance and secured.      The anterior chest was evaluated and an incision was made after  measurements were taken. A cuffed catheter was subcutaneously tunneled  from the anterior chest incision to the internal jugular venipuncture  site after path of tunnel was anesthetized. The dilator was exchanged  over guidewire for a peel-away sheath. Guidewire was removed. Under  fluoroscopic guidance, the catheter was placed through the peel-away  sheath. Peel-away sheath was removed.       Final catheter position saved. Both catheter lumens adequately  aspirated and flushed. Each lumen was heparin locked. A catheter  retaining suture and sterile dressing were applied. The skin  dermatotomy site overlying the internal jugular venipuncture was  closed with topical adhesive.     COMPLICATIONS: No immediate concerns, the patient remained stable  throughout the procedure and tolerated it well.     ESTIMATED BLOOD LOSS: Minimal     SPECIMENS: None     KEVIN POP PA-C    Current Medications:    gelatin absorbable  1 each Topical During Hemodialysis (from stock)     sodium chloride (PF)  3 mL Intracatheter During Hemodialysis (from stock)     sodium chloride (PF)  3 mL  "Intracatheter During Hemodialysis (from stock)     NEPHRO-OLI RX  1 tablet Oral Daily     calcium acetate  2,001 mg Oral TID w/meals     diltiazem  180 mg Oral Daily     ertapenem (INVanz) IV  500 mg Intravenous Q24H     heparin Lock (1000 units/mL High concentration)  3 mL Intracatheter Once     heparin Lock (1000 units/mL High concentration)  3 mL Intracatheter Once     atorvastatin  10 mg Oral Daily     fish oil-omega-3 fatty acids  1 capsule Oral Daily     gentamicin   Topical Daily     melatonin tablet 2 mg  2 mg Oral At Bedtime     sennosides  1 tablet Oral Daily     sodium bicarbonate  1,300 mg Oral BID     sodium chloride (PF)  3 mL Intracatheter Q8H       bupivacaine liposome (EXPAREL) LONG ACTING injection was administered into the infiltration site to produce postsurgical analgesia. Duration of action is up to 72 hours, and other \"jason\" medications should not be given for 96 hours with the exception of the lidocaine 5% patch (LIDODERM) and the lidocaine 10mg in potassium infusions. This entry is for INFORMATION ONLY.       Mira Schmidt NP  "

## 2018-01-10 NOTE — PLAN OF CARE
"Problem: Patient Care Overview  Goal: Plan of Care/Patient Progress Review  /88 (BP Location: Right arm)  Pulse 72  Temp 98  F (36.7  C) (Oral)  Resp 16  Ht 1.88 m (6' 2\")  Wt 96 kg (211 lb 10.3 oz)  SpO2 100%  BMI 27.17 kg/m2     A&Ox4, AVSS on 2L O2 via NC; on capnography, EtCO2 29-31, IPI 8-10. Denies nausea or dyspnea. Abdominal pain managed w/ PRN oxycodone x 1. Lap sites w/ liquid bandage CDI, PD catheter in place, no activity performed on it. Steven cather in place, patent, good UOP, no BM this shift. +BS, +flatus. LPIV is infusing NS @ 50mL/hr. Ziggy rizo, BRIDGET. Will continue to monitor and follow POC.        "

## 2018-01-10 NOTE — TELEPHONE ENCOUNTER
conrad  [facility]  Do you also want to see him for follow up?  Thanks!     Cathie Hannon RN

## 2018-01-10 NOTE — PROGRESS NOTES
HEMODIALYSIS TREATMENT NOTE    Date: 1/9/2018  Time: 7:27 PM    Data:  Pre Wt:   96 kg  Desired Wt: 95 kg   Ultrafiltration - Post Run Net Total Removed (mL): 0 mL  Ultrafiltration - Post Run Net Total Gain (mL): 100 mL  Vascular Access Status: Yes, secured and visible  Dialyzer Rinse: Moderate, Streaked  Total Blood Volume Processed: 41 L  Total Dialysis (Treatment) Time:   2 hours 40 minutes    Lab:   Yes - Hep B Surface Antigen & Antibody    Assessment/Interventions:  3.5 hour HD run, shortened by 50 minutes 2/2 high probability of system clotting. Dr. Cline notified. UF goal decreased 2/2 low BPs. Patient given one 100 ml NS bolus for low BP. Patient +100 mls at end of run. CVC saline locked and tegos placed post-treatment. BMP ordered for AM lab draw per Dr. Tay. Patient given Dilaudid for abdominal pain. SPO2 and EtCO2 wnl. Patient slept through most of run but arouses easily to verbal stimuli. See MAR for medication details. Report given to primary RN on 7A.        Plan:    Per renal team.

## 2018-01-10 NOTE — TELEPHONE ENCOUNTER
Coordinator notified by inpt team that pt was admitted for appendectomy. Incidentally found pulmonary nodules and liver lesions on CT. Pt will f/u with PCP on issues. Coordinator called pt to let him know that  his status was changed to inactive on kidney wait list.  Explained he will not be eligible for kidney offers while inactive, but will still accumulate wait time. Pt verbalized understanding and had no further questions. Contact information provided.    UNOS updated, immunology & PFR notified, change in status letter routed to admin team to send out.

## 2018-01-10 NOTE — PLAN OF CARE
"Problem: Patient Care Overview  Goal: Plan of Care/Patient Progress Review  Outcome: No Change  BP 98/68 (BP Location: Right arm)  Pulse 77  Temp 98.4  F (36.9  C) (Oral)  Resp 16  Ht 1.88 m (6' 2\")  Wt 96 kg (211 lb 10.3 oz)  SpO2 98%  BMI 27.17 kg/m2    Patient with soft BP's-- 100's/60's; afebrile. Capnography in place with IPI ~9-10. BG BID and 2 am. Oxy 5 mg administered and effective. Tolerating 2g K diet with good appetite. PIV infusing NS at 50 mL/hr. Steven in place with adequate output. PD catheter also in place. Aman placed today in IR, CDI. Lap sites dermabonded. Very tired upon transfer, resting comfortably in bed.          "

## 2018-01-10 NOTE — DISCHARGE SUMMARY
[facility], [place]    Discharge Summary  Transplant Surgery    Date of Admission:  1/8/2018  Date of Discharge:  1/10/2018  Discharging Provider:  Caden Tay MD / Margo Elam NP     Discharge Diagnoses   Principal Problem:    Appendicitis  Active Problems:    CKD (chronic kidney disease) stage 5, GFR less than 15 ml/min (H)    Hyperkalemia    Lesion of liver    Pulmonary nodules      History of Present Illness   Cesar Villalobos is an 52 year old male with history of ESKD due to HTN, on PD since 6/2017.  He is s/p repositioning of PD catheter on 1/5/18, at the time his catheter was noted to be stuck to his appendix.  He returned 1/9/18 with PD intolerance due to abdominal pain and hyperkalemia (missed 2 dialysis exchanges).  CT scan:  acute appendicitis, with catheter in the RLQ adjacent to the appendix.    Hospital Course   Appendicitis:  Underwent lap appendectomy on 1/9/18.  Found to be congested and inflamed, but not perforated.  Received ertapenem 1/8-1/10.  Will discharge on Levaquin x3 days.  Peritoneal fluid gram stain negative, cultures pending at time of discharge.      Hyperkalemia:  Secondary to missed dialysis exchanges.  K 5.9.  He was initially shifted with insulin and dextrose.  He was then dialyzed with subsequent normalization.    ESKD:  Due to appendectomy, will need to hold PD for several weeks.  A tunneled dialysis line was placed (right side) on 1/9/18.  Hemodialysis on 1/9 and 1/10.  He will start a TTS schedule outpatient.  He will need to follow up with Dr. Tay to determine when PD may resume.    Hypotension:  Diltiazem was restarted on admission and lisinopril was held.  Despite held medication, patient had low blood pressure in dialysis.  Discussed with Nephrology, will hold both lisinopril and diltiazem on discharge.     Pulmonary nodules:  Small nodules incidentally noted in lower lobes on CT.  Message left with PCP for follow up.  Kidney transplant  "waitlist coordinator also notified.    Liver lesions:  Hypodensities incidentally noted on CT.  Recommend US for follow up.  Message left for PCP for follow up.  Kidney transplant waitlist coordinator also notified.    History of urinary retention:  Despite history of post-operative urinary retention, patient was able to void 400ml without difficulty several hours after granados was removed.    Significant Results and Procedures    1/8/18 Non-contrast CT of abdomen/pelvis:  1. Mild inflammatory changes of the appendix which is surrounded by  the coiled tip of a peritoneal dialysis catheter, findings concerning  for acute appendicitis.  2. Several foci of free air in the upper abdomen, likely related to  recent peritoneal dialysis catheter repositioning.  3. Hypodensities in the liver are indeterminate on this noncontrast  CT. Consider initial further evaluation with ultrasound on a  nonemergent basis or comparison with outside imaging if available.  4. Bilateral lower lobes pulmonary nodules measuring up to 3 mm. In a  low risk patient no further workup is warranted. If the patient is  high risk consider follow-up with chest CT in 12 months.    1/9/18 Tunneled dialysis line placed.    1/9/18  Pre-operative diagnosis: Acute Appendicitis   Post-operative diagnosis Acute phlegmonous appendicitis     Procedure: Procedure(s):  Laparoscopic Appendectomy  - Wound Class: I-Clean   - Wound Class: I-Clean   Surgeon: Surgeon(s) and Role:     * Caden Tay MD - Primary       Findings: None.   The appendix was edematous, congested  inflamed.  It was not perforated. The peritoneal catheter was lying over the appendix. There was minimal sero-sanguinous fluid near the appendix.  \"we did re-anchor the peritoneal dialysis catheter lower in the pelvis with and endo-close stitch.\"           Pending Results   Unresulted Labs Ordered in the Past 30 Days of this Admission     Date and Time Order Name Status Description    1/9/2018 " 1331 Surgical pathology exam In process     1/9/2018 1247 Fungus Culture, non-blood Preliminary     1/9/2018 1247 Fluid Culture Aerobic Bacterial Preliminary     1/9/2018 1247 Anaerobic bacterial culture Preliminary           Code Status   Full    Primary Care Physician   Sallie Olsen    Time Spent on this Encounter   I, Margo Elam, personally saw the patient today and spent greater than 30 minutes discharging this patient.    Discharge Disposition   Discharged to home  Condition at discharge: Stable    Consultations This Hospital Stay   INTERVENTIONAL RADIOLOGY ADULT/PEDS IP CONSULT  SOCIAL WORK IP CONSULT  INTERVENTIONAL RADIOLOGY ADULT/PEDS IP CONSULT  NEPHROLOGY ESRD ADULT IP CONSULT    Discharge Orders     Reason for your hospital stay   Appendectomy, start temporary hemodialysis     Adult [facility] Follow-up and recommended labs and tests   Dr. Tay, Transplant Clinic, 2 weeks, follow up appendectomy and PD cath reposition    Appointments on [facility] and/or [facility] (with [facility] or [facility] provider or service). Call [phone] if you haven't heard regarding these appointments within 7 days of discharge.     Activity   Your activity upon discharge: No lifting greater than 10 pounds for at least 6 weeks, no driving while taking narcotic pain medications.     Follow Up and recommended labs and tests   Ultrasound of liver to evaluate hypodensities noted on CT scan.  Please talk with your primary care physician to schedule.     When to contact your care team   Notify Transplant Clinic if you develop redness or drainage from incisions or around PD cath.  Call [phone] and ask to have to Donor Transplant Surgery fellow paged.  For all other issues, call your Nephrologist.     Wound care and dressings   Instructions to care for your wound at home: Wash incisions daily with soap and water.     Discharge Instructions   HOLD your blood pressure medications (lisinopril, diltiazem).  Your blood  pressure will be re-evaluated in dialysis and they will tell you when to restart them.     Diet   Follow this diet upon discharge: Resume your normal diet       Discharge Medications   Current Discharge Medication List      START taking these medications    Details   acetaminophen (TYLENOL) 325 MG tablet Take 2 tablets (650 mg) by mouth every 4 hours as needed for mild pain    Associated Diagnoses: Acute appendicitis with localized peritonitis      levofloxacin (LEVAQUIN) 250 MG tablet Take 1 tablet (250 mg) by mouth every 48 hours  Qty: 2 tablet, Refills: 0    Associated Diagnoses: Acute appendicitis with localized peritonitis         CONTINUE these medications which have CHANGED    Details   oxyCODONE IR (ROXICODONE) 5 MG tablet Take 1 tablet (5 mg) by mouth every 6 hours as needed for pain  Qty: 15 tablet, Refills: 0    Associated Diagnoses: ESRD on peritoneal dialysis (H)         CONTINUE these medications which have NOT CHANGED    Details   sodium bicarbonate 650 MG tablet Take 2 tablets (1,300 mg) by mouth 2 times daily  Qty: 360 tablet, Refills: 3    Associated Diagnoses: Acidosis      atorvastatin (LIPITOR) 10 MG tablet Take 1 tablet (10 mg) by mouth daily  Qty: 90 tablet, Refills: 3    Associated Diagnoses: Mixed hyperlipidemia      senna (SENOKOT) 8.6 MG tablet Take 1 tablet by mouth daily  Qty: 100 tablet, Refills: 0    Associated Diagnoses: ESRD on peritoneal dialysis (H)      ondansetron (ZOFRAN) 4 MG tablet Take 1 tablet (4 mg) by mouth every 8 hours as needed for nausea  Qty: 18 tablet, Refills: 0    Associated Diagnoses: ESRD on peritoneal dialysis (H)      gentamicin (GARAMYCIN) 0.1 % cream Apply topically daily  Qty: 30 g, Refills: 11    Associated Diagnoses: Peritoneal dialysis catheter dysfunction, subsequent encounter (H)      calcium acetate (PHOSLO) 667 MG CAPS capsule Take 3 capsules (2,001 mg) by mouth 3 times daily (with meals)  Qty: 270 capsule, Refills: 11    Associated Diagnoses:  Hyperphosphatemia      B Complex-C-Folic Acid (DIALYVITE 800) 0.8 MG TABS Take 1 tablet by mouth daily  Qty: 90 tablet, Refills: 3    Associated Diagnoses: ESRD (end stage renal disease) on dialysis (H)      sevelamer (RENAGEL) 800 MG tablet Take 1 tablet (800 mg) by mouth 3 times daily (with meals)  Qty: 90 tablet, Refills: 11    Associated Diagnoses: Hyperphosphatemia      MELATONIN PO Take 2 mg by mouth At Bedtime      calcium carbonate (TUMS) 500 MG chewable tablet Take 1 chew tab by mouth 3 times daily Dose is 750mg    Associated Diagnoses: CKD (chronic kidney disease) stage 5, GFR less than 15 ml/min (H)      fish oil-omega-3 fatty acids (FISH OIL) 1000 MG capsule Take 1 capsule by mouth daily.         STOP taking these medications       diltiazem (DILT-XR) 180 MG 24 hr capsule Comments:   Reason for Stopping:         lisinopril (PRINIVIL/ZESTRIL) 20 MG tablet Comments:   Reason for Stopping:             Allergies   Allergies   Allergen Reactions     Nsaids      Swelling and Hives       Data   Most Recent 3 CBC's:  Recent Labs   Lab Test  01/08/18   1503  01/05/18   0706  10/14/17   1455   WBC  6.6  6.3  8.3   HGB  9.4*  10.5*  11.1*   MCV  95  95  92   PLT  218  220  244     Most Recent 3 BMP's:  Recent Labs   Lab Test  01/10/18   0752  01/09/18   2100  01/09/18   1000  01/09/18   0544   01/08/18   1503   NA  142   --    --   141   --   141   POTASSIUM  4.8  4.1  5.4*  5.2   < >  5.9*   CHLORIDE  106   --    --   108   --   111*   CO2  23   --    --   20   --   16*   BUN  76*   --    --   126*   --   125*   CR  8.99*   --    --   12.10*   --   11.60*   ANIONGAP  13   --    --   12   --   14   KAI  8.9   --    --   8.8   --   9.1   GLC  135*   --    --   91   --   84    < > = values in this interval not displayed.

## 2018-01-11 ENCOUNTER — CARE COORDINATION (OUTPATIENT)
Dept: CARE COORDINATION | Facility: CLINIC | Age: 53
End: 2018-01-11

## 2018-01-12 LAB — COPATH REPORT: NORMAL

## 2018-01-12 NOTE — TELEPHONE ENCOUNTER
Patient called asking where and what test should he be scheduling; Chest CT and Abdominal US? Writer sent Surgical PA message asking if he needs both tests completed at this time. Writer attempted to call patient back with update but voicemail was full.

## 2018-01-14 LAB
BACTERIA SPEC CULT: NO GROWTH
SPECIMEN SOURCE: NORMAL

## 2018-01-15 ENCOUNTER — TELEPHONE (OUTPATIENT)
Dept: TRANSPLANT | Facility: CLINIC | Age: 53
End: 2018-01-15

## 2018-01-15 DIAGNOSIS — N18.6 ESRD (END STAGE RENAL DISEASE) (H): ICD-10-CM

## 2018-01-15 DIAGNOSIS — Z76.82 AWAITING ORGAN TRANSPLANT: Primary | ICD-10-CM

## 2018-01-15 NOTE — TELEPHONE ENCOUNTER
Spoke to patient and asked that he called our  Wed this week to set up Chest CT and Abd US. Patient verbalized understanding and has no further questions at this time.

## 2018-01-16 LAB
BACTERIA SPEC CULT: NORMAL
Lab: NORMAL
SPECIMEN SOURCE: NORMAL

## 2018-01-17 ENCOUNTER — TEAM CONFERENCE (OUTPATIENT)
Dept: TRANSPLANT | Facility: CLINIC | Age: 53
End: 2018-01-17

## 2018-01-17 NOTE — TELEPHONE ENCOUNTER
TEAM CONFERENCE:    ATTENDEES: Jamison Merida Schumaker, Schenk, Coordinators, Social Work, Pharmacy, Finance, and Dietary    DISCUSSION and OUTCOME: Status changed to inactive d/t new pulmonary noedules and liver lesions.

## 2018-01-22 ENCOUNTER — RADIANT APPOINTMENT (OUTPATIENT)
Dept: CT IMAGING | Facility: CLINIC | Age: 53
End: 2018-01-22
Attending: INTERNAL MEDICINE
Payer: COMMERCIAL

## 2018-01-22 ENCOUNTER — OFFICE VISIT (OUTPATIENT)
Dept: TRANSPLANT | Facility: CLINIC | Age: 53
End: 2018-01-22
Attending: TRANSPLANT SURGERY
Payer: COMMERCIAL

## 2018-01-22 VITALS
RESPIRATION RATE: 16 BRPM | HEART RATE: 81 BPM | DIASTOLIC BLOOD PRESSURE: 82 MMHG | TEMPERATURE: 98.5 F | SYSTOLIC BLOOD PRESSURE: 138 MMHG | OXYGEN SATURATION: 100 %

## 2018-01-22 DIAGNOSIS — N18.6 ESRD (END STAGE RENAL DISEASE) (H): ICD-10-CM

## 2018-01-22 DIAGNOSIS — N18.6 ESRD (END STAGE RENAL DISEASE) (H): Primary | ICD-10-CM

## 2018-01-22 DIAGNOSIS — Z76.82 AWAITING ORGAN TRANSPLANT: ICD-10-CM

## 2018-01-22 DIAGNOSIS — K35.30 ACUTE APPENDICITIS WITH LOCALIZED PERITONITIS: ICD-10-CM

## 2018-01-22 PROCEDURE — G0463 HOSPITAL OUTPT CLINIC VISIT: HCPCS | Mod: ZF

## 2018-01-22 ASSESSMENT — PAIN SCALES - GENERAL: PAINLEVEL: NO PAIN (0)

## 2018-01-22 NOTE — LETTER
1/22/2018      RE: Cesar Villalobos  [address] [place] [address]         Transplant Surgery      Reason For Visit: follow up s/p lap appy and repositioning of PD catheter    History of Present Illness:  The patient has had an interesting course over recent weeks.  He had PD catheter placed, but had discomfort with infusion and drainage and poor flows.  We took him to the operating room and performed laparoscopy.  The PD catheter was entwined in with the appendix.     We were able to unfurl the appendix from the catheter and reposition the cathter in the pelvix.      Next the patient represented with diagnosis of appendicitis.  We perfomed lap appy.  There was no gross contamination.  Cx was negative.  The catheter was again adherent to the appendix.  We repostioned the catheter, fixed it in place, and treated with a short course of ABX.    The patient now comes in for follow up.  He is feeling well.  He has not started PD yet.  He is using CVC for HD.  No f/c/s.  Still making urine.      Exam:   /82  Pulse 81  Temp 98.5  F (36.9  C) (Oral)  Resp 16  SpO2 100%  Well appearing  No apparent distress   Incisions clean, dry, and intact   abd soft and non tender  PD site clean    Path:  appendiceal serositis    Impression:  Satisfactory recovery from appendectomy.  OK to start using PD catheter    Plan: Low volume PD catheter cycling.  Also patient needs follow up of liver hypo densities.  Patient knows about this and will schedule imaging.    Caden Tay MD, PhD  Transplant Surgery

## 2018-01-22 NOTE — MR AVS SNAPSHOT
After Visit Summary   1/22/2018    Cesar Villalobos    MRN: [id number]           Patient Information     Date Of Birth          1965        Visit Information        Provider Department      1/22/2018 3:45 PM Caden Tay MD [facility] Solid Organ Transplant        Today's Diagnoses     ESRD (end stage renal disease) (H)    -  1    Acute appendicitis with localized peritonitis           Follow-ups after your visit        Who to contact     If you have questions or need follow up information about today's clinic visit or your schedule please contact [facility] directly at [phone].  Normal or non-critical lab and imaging results will be communicated to you by [email or web address], letter or phone within 4 business days after the clinic has received the results. If you do not hear from us within 7 days, please contact the clinic through [employer] or phone. If you have a critical or abnormal lab result, we will notify you by phone as soon as possible.  Submit refill requests through [employer] or call your pharmacy and they will forward the refill request to us. Please allow 3 business days for your refill to be completed.          Additional Information About Your Visit        MyChart Information     [employer] gives you secure access to your electronic health record. If you see a primary care provider, you can also send messages to your care team and make appointments. If you have questions, please call your primary care clinic.  If you do not have a primary care provider, please call [phone] and they will assist you.        Care EveryWhere ID     This is your Care EveryWhere ID. This could be used by other organizations to access your [facility] medical records  [id number]        Your Vitals Were     Pulse Temperature Respirations Pulse Oximetry          81 98.5  F (36.9  C) (Oral) 16 100%         Blood Pressure from Last 3 Encounters:   01/22/18 138/82   01/10/18 122/73   01/07/18  122/72    Weight from Last 3 Encounters:   01/10/18 92.4 kg (203 lb 11.3 oz)   01/07/18 95.3 kg (210 lb)   01/05/18 95.3 kg (210 lb 1.6 oz)              Today, you had the following     No orders found for display       Primary Care Provider Office Phone # Fax #    Sallie Olsen [phone] [address] [place] [address]        Equal Access to Services     COLLEEN DEWEY : Hadii carolynn ku hadasho Soomaali, waaxda luqadaha, qaybta kaalmada adeegyada, chandrakant coleman . So [facility] [phone].    ATENCIÓN: Si habla español, tiene a coe disposición servicios gratuitos de asistencia lingüística. LlThe [facility] [phone].    We comply with applicable federal civil rights laws and [place] laws. We do not discriminate on the basis of race, color, national origin, age, disability, sex, sexual orientation, or gender identity.            Thank you!     Thank you for choosing [facility]  for your care. Our goal is always to provide you with excellent care. Hearing back from our patients is one way we can continue to improve our services. Please take a few minutes to complete the written survey that you may receive in the mail after your visit with us. Thank you!             Your Updated Medication List - Protect others around you: Learn how to safely use, store and throw away your medicines at www.disposemymeds.org.          This list is accurate as of 1/22/18 11:59 PM.  Always use your most recent med list.                   Brand Name Dispense Instructions for use Diagnosis    acetaminophen 325 MG tablet    TYLENOL     Take 2 tablets (650 mg) by mouth every 4 hours as needed for mild pain    Acute appendicitis with localized peritonitis       atorvastatin 10 MG tablet    LIPITOR    90 tablet    Take 1 tablet (10 mg) by mouth daily    Mixed hyperlipidemia       calcium acetate 667 MG Caps capsule    PHOSLO    270 capsule    Take 3 capsules (2,001 mg)  by mouth 3 times daily (with meals)    Hyperphosphatemia       calcium carbonate 500 MG chewable tablet    TUMS     Take 1 chew tab by mouth 3 times daily Dose is 750mg    CKD (chronic kidney disease) stage 5, GFR less than 15 ml/min (H)       DIALYVITE 800 0.8 MG Tabs     90 tablet    Take 1 tablet by mouth daily    ESRD (end stage renal disease) on dialysis (H)       fish oil-omega-3 fatty acids 1000 MG capsule      Take 1 capsule by mouth daily.        gentamicin 0.1 % cream    GARAMYCIN    30 g    Apply topically daily    Peritoneal dialysis catheter dysfunction, subsequent encounter (H)       levofloxacin 250 MG tablet    LEVAQUIN    2 tablet    Take 1 tablet (250 mg) by mouth every 48 hours    Acute appendicitis with localized peritonitis       MELATONIN PO      Take 2 mg by mouth At Bedtime        ondansetron 4 MG tablet    ZOFRAN    18 tablet    Take 1 tablet (4 mg) by mouth every 8 hours as needed for nausea    ESRD on peritoneal dialysis (H)       oxyCODONE IR 5 MG tablet    ROXICODONE    15 tablet    Take 1 tablet (5 mg) by mouth every 6 hours as needed for pain    ESRD on peritoneal dialysis (H)       senna 8.6 MG tablet    SENOKOT    100 tablet    Take 1 tablet by mouth daily    ESRD on peritoneal dialysis (H)       sevelamer 800 MG tablet    RENAGEL    90 tablet    Take 1 tablet (800 mg) by mouth 3 times daily (with meals)    Hyperphosphatemia       sodium bicarbonate 650 MG tablet     360 tablet    Take 2 tablets (1,300 mg) by mouth 2 times daily    Acidosis

## 2018-01-22 NOTE — NURSING NOTE
"Chief Complaint   Patient presents with     Follow Up For     PD cath       Initial /82  Pulse 81  Temp 98.5  F (36.9  C) (Oral)  Resp 16  SpO2 100% Estimated body mass index is 26.15 kg/(m^2) as calculated from the following:    Height as of 1/8/18: 1.88 m (6' 2\").    Weight as of 1/10/18: 92.4 kg (203 lb 11.3 oz).      "

## 2018-01-24 ENCOUNTER — TELEPHONE (OUTPATIENT)
Dept: TRANSPLANT | Facility: CLINIC | Age: 53
End: 2018-01-24

## 2018-01-24 NOTE — TELEPHONE ENCOUNTER
Coordinator spoke with pt about needing to complete CT w/ contrast. Pt states he saw Dr. Tay this wk who stated pt could start PD (currently on hemodialysis). Pt states he is open to staying on hemo until CT is completed if contrast is better removed with hemo vs PD. Staff msg sent to Dr. Muñiz.

## 2018-01-24 NOTE — PROGRESS NOTES
Transplant Surgery      Reason For Visit: follow up s/p lap appy and repositioning of PD catheter    History of Present Illness:  The patient has had an interesting course over recent weeks.  He had PD catheter placed, but had discomfort with infusion and drainage and poor flows.  We took him to the operating room and performed laparoscopy.  The PD catheter was entwined in with the appendix.     We were able to unfurl the appendix from the catheter and reposition the cathter in the pelvix.      Next the patient represented with diagnosis of appendicitis.  We perfomed lap appy.  There was no gross contamination.  Cx was negative.  The catheter was again adherent to the appendix.  We repostioned the catheter, fixed it in place, and treated with a short course of ABX.    The patient now comes in for follow up.  He is feeling well.  He has not started PD yet.  He is using CVC for HD.  No f/c/s.  Still making urine.      Exam:   /82  Pulse 81  Temp 98.5  F (36.9  C) (Oral)  Resp 16  SpO2 100%  Well appearing  No apparent distress   Incisions clean, dry, and intact   abd soft and non tender  PD site clean    Path:  appendiceal serositis    Impression:  Satisfactory recovery from appendectomy.  OK to start using PD catheter    Plan: Low volume PD catheter cycling.  Also patient needs follow up of liver hypo densities.  Patient knows about this and will schedule imaging.    Caden Tay MD, PhD  Transplant Surgery

## 2018-01-26 DIAGNOSIS — R16.0 LIVER MASSES: Primary | ICD-10-CM

## 2018-01-30 ENCOUNTER — TELEPHONE (OUTPATIENT)
Dept: TRANSPLANT | Facility: CLINIC | Age: 53
End: 2018-01-30

## 2018-01-31 ENCOUNTER — TELEPHONE (OUTPATIENT)
Dept: TRANSPLANT | Facility: CLINIC | Age: 53
End: 2018-01-31

## 2018-01-31 NOTE — TELEPHONE ENCOUNTER
Coordinator spoke with pt. CT is scheduled for tomorrow. Reviewed Dr. Muñiz's recommendation for a Monday CT. Pt states he will make sure to stay hydrated for his CT.

## 2018-02-01 ENCOUNTER — RADIANT APPOINTMENT (OUTPATIENT)
Dept: CT IMAGING | Facility: CLINIC | Age: 53
End: 2018-02-01
Attending: INTERNAL MEDICINE
Payer: COMMERCIAL

## 2018-02-01 DIAGNOSIS — R16.0 LIVER MASSES: ICD-10-CM

## 2018-02-01 RX ORDER — IOPAMIDOL 755 MG/ML
124 INJECTION, SOLUTION INTRAVASCULAR ONCE
Status: COMPLETED | OUTPATIENT
Start: 2018-02-01 | End: 2018-02-01

## 2018-02-01 RX ADMIN — IOPAMIDOL 124 ML: 755 INJECTION, SOLUTION INTRAVASCULAR at 08:54

## 2018-02-01 NOTE — DISCHARGE INSTRUCTIONS

## 2018-02-05 ENCOUNTER — RADIANT APPOINTMENT (OUTPATIENT)
Dept: GENERAL RADIOLOGY | Facility: CLINIC | Age: 53
End: 2018-02-05
Attending: CLINICAL NURSE SPECIALIST
Payer: COMMERCIAL

## 2018-02-05 ENCOUNTER — TELEPHONE (OUTPATIENT)
Dept: TRANSPLANT | Facility: CLINIC | Age: 53
End: 2018-02-05

## 2018-02-05 DIAGNOSIS — N18.6 ESRD ON DIALYSIS (H): ICD-10-CM

## 2018-02-05 DIAGNOSIS — T85.611A PERITONEAL DIALYSIS CATHETER DYSFUNCTION, INITIAL ENCOUNTER (H): ICD-10-CM

## 2018-02-05 DIAGNOSIS — Z99.2 ESRD ON DIALYSIS (H): Primary | ICD-10-CM

## 2018-02-05 DIAGNOSIS — Z99.2 ESRD ON DIALYSIS (H): ICD-10-CM

## 2018-02-05 DIAGNOSIS — N18.6 ESRD ON DIALYSIS (H): Primary | ICD-10-CM

## 2018-02-05 PROCEDURE — 74019 RADEX ABDOMEN 2 VIEWS: CPT

## 2018-02-05 NOTE — TELEPHONE ENCOUNTER
Pt's status was changed to active on kidney wait list. Pt verbalized good understanding and had no further questions.       UNOS updated, immunology & PFR notified, change in status letter routed to admin team to send out.

## 2018-02-05 NOTE — TELEPHONE ENCOUNTER
"Patient was F/U with Dr. Tay on 1/22/2018 after appendectomy and PD repositioned on 1/5/2018. Of Dr. Tay note on 1/22,    \"Impression:  Satisfactory recovery from appendectomy.  OK to start using PD catheter   Plan: Low volume PD catheter cycling. ......\"    Writer [facility], who reports that patient was on HD since appendectomy and PD repositioned on 1/5/2018. Patient attempted with low volume cycler that triggered alarms, He switched with manual exchange by gravity, PD cath was able to fill but not drain. Solomon PD RN tried to aspirate PD acth with a syring, unable to pull fluid as well. Patient is not constipated.  Writer recommended patient to have abdominal x Ray at [facility] [place] today and be sure constipation resolved if any. Writer will F/U Abdominal x ray with Dr. Tay for plan.  Solomon FELICIANO  verbalized acceptance and understanding of plan.  Abdominal X Ray today showed  Catheter coiled in the pelvic region. Large amount of stool right colon. Exam otherwise unremarkable.    Writer reviewed and discussed abdominal X Ray result with Dr. Tay, who recommended aggressive stool softeners to resolve constipation and try PD exchange again.  Writer called [facility] Solomon for Dr. Tay recommendation, who will discuss with Dr. Muñiz and contact patient for plan.    "

## 2018-02-06 LAB
FUNGUS SPEC CULT: NORMAL
SPECIMEN SOURCE: NORMAL

## 2018-02-07 ENCOUNTER — TEAM CONFERENCE (OUTPATIENT)
Dept: TRANSPLANT | Facility: CLINIC | Age: 53
End: 2018-02-07

## 2018-02-07 NOTE — TELEPHONE ENCOUNTER
Team Conference:      Attendees: Bernard Quarles Keys, Schumacher, Schenk,   Coordinator, , Dietician, Pharmacy    Discussion and Outcome: Patient status changed to active approved.

## 2018-02-13 ENCOUNTER — TELEPHONE (OUTPATIENT)
Dept: TRANSPLANT | Facility: CLINIC | Age: 53
End: 2018-02-13

## 2018-02-13 NOTE — TELEPHONE ENCOUNTER
Received a call from [facility] PD RN Solomon, who reports that PD cath is still not functioning well despite aggressive laxatives since last week. PD Cath is unable to fill and drain with night cycler, and also not drained by gravity. PD RN was unable to flush PD Cath with 30 mL syringe. PD Cath collapsed when aspirated.   Dr. Tay and Tx fellow Dr. Edge notified.  The patient is scheduled PD catheter repositioning on 2/15/2018 with check in time at 7 am.  Writer called patient for surgery date/time today, and unable to reach patient. VM left to phone to call back to writer.  Writer called [facility] PD BRADEN Anthony to contact patient for surgery check in time tomorrow, NPO after midnight and pre op H & P with his PCP today. Patient also needs a drive to  after surgery.  PD BRADEN Anthony  verbalized acceptance and understanding of plan and will call back to writer for questions.

## 2018-02-14 NOTE — TELEPHONE ENCOUNTER
Finally,  Patient called back to writer for PD Catheter repositioned surgery tomorrow. However, he stated that he is not ready for another surgery and he will not be available for surgery tomorrow neither. He would like to discuss with Dr. Muñiz for dialysis plan and options. Cancellation of PD catheter repositioning is requested, writer will send message to Dr. Tay's AA to cancel surgery. Writer provided contact phone number and instructed patient to call writer with any questions or concerns. Patient verbalized acceptance and understanding of plan.   Dr. Tay's AA Itzel notified to cancel PD Cath repositioning tomorrow.

## 2018-02-26 DIAGNOSIS — N18.6 END STAGE RENAL DISEASE (H): ICD-10-CM

## 2018-02-26 DIAGNOSIS — Z76.82 ORGAN TRANSPLANT CANDIDATE: Primary | ICD-10-CM

## 2018-03-06 ENCOUNTER — OFFICE VISIT (OUTPATIENT)
Dept: FAMILY MEDICINE | Facility: CLINIC | Age: 53
End: 2018-03-06
Payer: MEDICARE

## 2018-03-06 ENCOUNTER — ANESTHESIA EVENT (OUTPATIENT)
Dept: SURGERY | Facility: CLINIC | Age: 53
End: 2018-03-06
Payer: COMMERCIAL

## 2018-03-06 VITALS
SYSTOLIC BLOOD PRESSURE: 128 MMHG | OXYGEN SATURATION: 97 % | HEART RATE: 90 BPM | TEMPERATURE: 98.3 F | WEIGHT: 210 LBS | BODY MASS INDEX: 26.96 KG/M2 | DIASTOLIC BLOOD PRESSURE: 74 MMHG | RESPIRATION RATE: 18 BRPM

## 2018-03-06 DIAGNOSIS — Z76.82 ORGAN TRANSPLANT CANDIDATE: ICD-10-CM

## 2018-03-06 DIAGNOSIS — N18.5 ANEMIA OF CHRONIC RENAL FAILURE, STAGE 5 (H): ICD-10-CM

## 2018-03-06 DIAGNOSIS — Z01.818 PREOP GENERAL PHYSICAL EXAM: Primary | ICD-10-CM

## 2018-03-06 DIAGNOSIS — D63.1 ANEMIA OF CHRONIC RENAL FAILURE, STAGE 5 (H): ICD-10-CM

## 2018-03-06 DIAGNOSIS — N18.5 CKD (CHRONIC KIDNEY DISEASE) STAGE 5, GFR LESS THAN 15 ML/MIN (H): ICD-10-CM

## 2018-03-06 LAB
ALBUMIN SERPL-MCNC: 3.8 G/DL (ref 3.4–5)
ANION GAP SERPL CALCULATED.3IONS-SCNC: 13 MMOL/L (ref 3–14)
BUN SERPL-MCNC: 76 MG/DL (ref 7–30)
CALCIUM SERPL-MCNC: 9 MG/DL (ref 8.5–10.1)
CHLORIDE SERPL-SCNC: 107 MMOL/L (ref 94–109)
CO2 SERPL-SCNC: 21 MMOL/L (ref 20–32)
CREAT SERPL-MCNC: 11.6 MG/DL (ref 0.66–1.25)
ERYTHROCYTE [DISTWIDTH] IN BLOOD BY AUTOMATED COUNT: 14.9 % (ref 10–15)
GFR SERPL CREATININE-BSD FRML MDRD: 5 ML/MIN/1.7M2
GLUCOSE SERPL-MCNC: 100 MG/DL (ref 70–99)
HCT VFR BLD AUTO: 32.8 % (ref 40–53)
HGB BLD-MCNC: 10.3 G/DL (ref 13.3–17.7)
MCH RBC QN AUTO: 32.1 PG (ref 26.5–33)
MCHC RBC AUTO-ENTMCNC: 31.4 G/DL (ref 31.5–36.5)
MCV RBC AUTO: 102 FL (ref 78–100)
PHOSPHATE SERPL-MCNC: 7.2 MG/DL (ref 2.5–4.5)
PLATELET # BLD AUTO: 220 10E9/L (ref 150–450)
POTASSIUM SERPL-SCNC: 4.8 MMOL/L (ref 3.4–5.3)
RBC # BLD AUTO: 3.21 10E12/L (ref 4.4–5.9)
SODIUM SERPL-SCNC: 141 MMOL/L (ref 133–144)
WBC # BLD AUTO: 5.6 10E9/L (ref 4–11)

## 2018-03-06 PROCEDURE — 99214 OFFICE O/P EST MOD 30 MIN: CPT | Performed by: FAMILY MEDICINE

## 2018-03-06 PROCEDURE — 80069 RENAL FUNCTION PANEL: CPT | Performed by: FAMILY MEDICINE

## 2018-03-06 PROCEDURE — 85027 COMPLETE CBC AUTOMATED: CPT | Performed by: FAMILY MEDICINE

## 2018-03-06 PROCEDURE — 36415 COLL VENOUS BLD VENIPUNCTURE: CPT | Performed by: FAMILY MEDICINE

## 2018-03-06 NOTE — PROGRESS NOTES
[address] [address]  [phone]  Dept: [phone]    PRE-OP EVALUATION:  Today's date: 3/6/2018    Cesar Villalobos (: 1965) presents for pre-operative evaluation assessment as requested by Dr. Tay, Caden Quiñonez MD.  He requires evaluation and anesthesia risk assessment prior to undergoing surgery/procedure for treatment of  LAPAROSCOPIC INSERTION CATHETER PERITONEAL DIALYSIS .    Fax number for surgical facility:   Primary Physician: Sallie Olsen  Type of Anesthesia Anticipated: General    Patient has a Health Care Directive or Living Will:  NO    Preop Questions 3/6/2018   Who is doing your surgery? finger   What are you having done? peretoneal catheter placed   Date of Surgery/Procedure:    Facility or Hospital where procedure/surgery will be performed: [facility]   1.  Do you have a history of Heart attack, stroke, stent, coronary bypass surgery, or other heart surgery? No   2.  Do you ever have any pain or discomfort in your chest? No   3.  Do you have a history of  Heart Failure? No   4.   Are you troubled by shortness of breath when:  walking on a level surface, or up a slight hill, or at night? No   5.  Do you currently have a cold, bronchitis or other respiratory infection? No   6.  Do you have a cough, shortness of breath, or wheezing? No   7.  Do you sometimes get pains in the calves of your legs when you walk? No   8. Do you or anyone in your family have previous history of blood clots? YES -     9.  Do you or does anyone in your family have a serious bleeding problem such as prolonged bleeding following surgeries or cuts? No   10. Have you ever had problems with anemia or been told to take iron pills? YES - ACD diagnosed. On iron   11. Have you had any abnormal blood loss such as black, tarry or bloody stools? No   12. Have you ever had a blood transfusion? No   13. Have you or any of your relatives ever had  problems with anesthesia? No   14. Do you have sleep apnea, excessive snoring or daytime drowsiness? No   15. Do you have any prosthetic heart valves? No   16. Do you have prosthetic joints? No         HPI:     HPI related to upcoming procedure: peritoneal dialysis catheter is clogged.     CKD on dialysis.     MEDICAL HISTORY:     Patient Active Problem List    Diagnosis Date Noted     Lesion of liver 01/10/2018     Priority: Medium     1/8/18 Non-contrast CT: Ovoid low-attenuation region in the  posterior left hepatic lobe measuring 3.2 x 2.7 cm (series 5, image  93). Additional smaller, similar-appearing focus in the hepatic dome  (series 5, image 55).        Pulmonary nodules 01/10/2018     Priority: Medium     1/8/18 Non-contrast CT abd/pelvis: Bilateral lower lobes pulmonary nodules measuring up to 3 mm in lower lobes.       Hyperkalemia 01/09/2018     Priority: Medium     Appendicitis 01/08/2018     Priority: Medium     Restless leg syndrome 05/25/2017     Priority: Medium     Anemia of chronic renal failure, stage 5 (H) 05/25/2017     Priority: Medium     Acidosis 08/30/2016     Priority: Medium     Secondary renal hyperparathyroidism (H) 08/30/2016     Priority: Medium     Organ transplant candidate 12/25/2014     Priority: Medium     CKD (chronic kidney disease) 07/06/2012     Priority: Medium     CKD (chronic kidney disease) stage 5, GFR less than 15 ml/min (H) 07/06/2012     Priority: Medium     Hyperlipidemia 07/06/2012     Priority: Medium     Problem list name updated by automated process. Provider to review        Past Medical History:   Diagnosis Date     Anemia in chronic kidney disease      Dyslipidemia      End stage renal disease on dialysis (H)      Hypertension      Past Surgical History:   Procedure Laterality Date     HERNIA REPAIR, INGUINAL RT/LT Left     as child     LAPAROSCOPIC APPENDECTOMY N/A 1/9/2018    Procedure: LAPAROSCOPIC APPENDECTOMY;  Laparoscopic Appendectomy ;  Surgeon:  Caden Tay MD;  Location: [facility] OR     LAPAROSCOPIC INSERTION CATHETER PERITONEAL DIALYSIS N/A 6/6/2017    Procedure: LAPAROSCOPIC INSERTION CATHETER PERITONEAL DIALYSIS;  Laparoscopic Peritoneal Dialysis Catheter Placement. ;  Surgeon: Caden Tay MD;  Location: [facility] OR     LAPAROSCOPIC INSERTION CATHETER PERITONEAL DIALYSIS N/A 1/5/2018    Procedure: LAPAROSCOPIC INSERTION CATHETER PERITONEAL DIALYSIS;  Laparoscopic Repositioning of Peritoneal Dialysis Catheter.;  Surgeon: Caden Tay MD;  Location: [facility] OR     LAPAROSCOPIC INSERTION CATHETER PERITONEAL DIALYSIS N/A 1/9/2018    Procedure: LAPAROSCOPIC INSERTION CATHETER PERITONEAL DIALYSIS;;  Surgeon: Caden Tay MD;  Location: [facility] OR     Current Outpatient Prescriptions   Medication Sig Dispense Refill     senna (SENOKOT) 8.6 MG tablet Take 1 tablet by mouth daily 100 tablet 0     gentamicin (GARAMYCIN) 0.1 % cream Apply topically daily 30 g 11     calcium acetate (PHOSLO) 667 MG CAPS capsule Take 3 capsules (2,001 mg) by mouth 3 times daily (with meals) 270 capsule 11     sodium bicarbonate 650 MG tablet Take 2 tablets (1,300 mg) by mouth 2 times daily 360 tablet 3     atorvastatin (LIPITOR) 10 MG tablet Take 1 tablet (10 mg) by mouth daily 90 tablet 3     B Complex-C-Folic Acid (DIALYVITE 800) 0.8 MG TABS Take 1 tablet by mouth daily 90 tablet 3     sevelamer (RENAGEL) 800 MG tablet Take 1 tablet (800 mg) by mouth 3 times daily (with meals) (Patient taking differently: Take 800 mg by mouth 3 times daily (with meals) ) 90 tablet 11     fish oil-omega-3 fatty acids (FISH OIL) 1000 MG capsule Take 1 capsule by mouth daily.       OTC products: None, except as noted above    Allergies   Allergen Reactions     Nsaids      Swelling and Hives        Latex Allergy: NO    Social History   Substance Use Topics     Smoking status: Never Smoker     Smokeless tobacco: Never Used     Alcohol use No     History   Drug Use No       REVIEW OF SYSTEMS:    CONSTITUTIONAL: NEGATIVE for fever, chills, change in weight  ENT/MOUTH: NEGATIVE for ear, mouth and throat problems  RESP: NEGATIVE for significant cough or SOB  CV: NEGATIVE for chest pain, palpitations or peripheral edema    EXAM:   /74 (BP Location: Right arm, Patient Position: Sitting, Cuff Size: Adult Regular)  Pulse 90  Temp 98.3  F (36.8  C) (Oral)  Resp 18  Wt 210 lb (95.3 kg)  SpO2 97%  BMI 26.96 kg/m2  GENERAL APPEARANCE: healthy, alert and no distress  HENT: ear canals and TM's normal and nose and mouth without ulcers or lesions  RESP: lungs clear to auscultation - no rales, rhonchi or wheezes  CV: regular rate and rhythm, normal S1 S2, no S3 or S4 and no murmur, click or rub   ABDOMEN: soft, nontender, no HSM or masses and bowel sounds normal  NEURO: Normal strength and tone, sensory exam grossly normal, mentation intact and speech normal    DIAGNOSTICS:   EK18-appears normal, NSR, normal axis, normal intervals, no acute ST/T changes c/w ischemia, no LVH by voltage criteria, unchanged from previous tracings    Recent Labs   Lab Test  01/10/18   0752  18   2100   18   0544   18   2009   18   1503  18   0706   HGB   --    --    --    --    --    --    --   9.4*  10.5*   PLT   --    --    --    --    --    --    --   218  220   INR   --    --    --    --    --   0.94   --    --   0.88   NA  142   --    --   141   --    --    --   141   --    POTASSIUM  4.8  4.1   < >  5.2   < >   --    < >  5.9*  5.2   CR  8.99*   --    --   12.10*   --    --    --   11.60*  10.50*    < > = values in this interval not displayed.     Labs from today pending.    IMPRESSION:       ICD-10-CM    1. Preop general physical exam Z01.818 Renal panel (Alb, BUN, Ca, Cl, CO2, Creat, Gluc, Phos, K, Na)     CBC with platelets     JUST IN CASE   2. CKD (chronic kidney disease) stage 5, GFR less than 15 ml/min (H) N18.5    3. Anemia of chronic renal failure, stage 5 (H) N18.5     D63.1     4. Organ transplant candidate Z76.82         The proposed surgical procedure is considered INTERMEDIATE risk.    REVISED CARDIAC RISK INDEX  The patient has the following serious cardiovascular risks for perioperative complications such as (MI, PE, VFib and 3  AV Block):  Serum Creatinine >2.0 mg/dl  INTERPRETATION: 1 risks: Class II (low risk - 0.9% complication rate)    The patient has the following additional risks for perioperative complications:  No identified additional risks      RECOMMENDATIONS:   }    --Patient is to take all scheduled medications on the day of surgery EXCEPT for modifications listed below.    APPROVAL GIVEN to proceed with proposed procedure, without further diagnostic evaluation       Signed Electronically by: Bo Helton MD    Copy of this evaluation report is provided to requesting physician.    [place] Preop Guidelines

## 2018-03-06 NOTE — MR AVS SNAPSHOT
After Visit Summary   3/6/2018    Cesar Villalobos    MRN: [id number]           Patient Information     Date Of Birth          1965        Visit Information        Provider Department      3/6/2018 9:00 AM Bo Helton MD [facility]        Today's Diagnoses     Preop general physical exam    -  1    CKD (chronic kidney disease) stage 5, GFR less than 15 ml/min (H)        Anemia of chronic renal failure, stage 5 (H)        Organ transplant candidate          Care Instructions      Before Your Surgery      Call your surgeon if there is any change in your health. This includes signs of a cold or flu (such as a sore throat, runny nose, cough, rash or fever).    Do not smoke, drink alcohol or take over the counter medicine (unless your surgeon or primary care doctor tells you to) for the 24 hours before and after surgery.    If you take prescribed drugs: Follow your doctor s orders about which medicines to take and which to stop until after surgery.    Eating and drinking prior to surgery: follow the instructions from your surgeon    Take a shower or bath the night before surgery. Use the soap your surgeon gave you to gently clean your skin. If you do not have soap from your surgeon, use your regular soap. Do not shave or scrub the surgery site.  Wear clean pajamas and have clean sheets on your bed.           Follow-ups after your visit        Your next 10 appointments already scheduled     Mar 07, 2018   Procedure with Caden Tay MD   [facility], [place], Same Day Surgery (--)    [address] [address]   [phone]            Mar 21, 2018  1:30 PM CDT   (Arrive by 1:15 PM)   Post-Op with RUPA Muñoz [facility] Solid Organ Transplant ([facility])    [address]  [address]  [facility] [address] [phone]              Who to contact     If you have questions or need follow up information about today's clinic  visit or your schedule please contact [facility] directly at [phone].  Normal or non-critical lab and imaging results will be communicated to you by Kodinghart, letter or phone within 4 business days after the clinic has received the results. If you do not hear from us within 7 days, please contact the clinic through Jildyt or phone. If you have a critical or abnormal lab result, we will notify you by phone as soon as possible.  Submit refill requests through [employer] or call your pharmacy and they will forward the refill request to us. Please allow 3 business days for your refill to be completed.          Additional Information About Your Visit        [employer] Information     [employer] gives you secure access to your electronic health record. If you see a primary care provider, you can also send messages to your care team and make appointments. If you have questions, please call your primary care clinic.  If you do not have a primary care provider, please call [phone] and they will assist you.        Care EveryWhere ID     This is your Care EveryWhere ID. This could be used by other organizations to access your [place] medical records  [id number]        Your Vitals Were     Pulse Temperature Respirations Pulse Oximetry BMI (Body Mass Index)       90 98.3  F (36.8  C) (Oral) 18 97% 26.96 kg/m2        Blood Pressure from Last 3 Encounters:   03/06/18 128/74   01/22/18 138/82   01/10/18 122/73    Weight from Last 3 Encounters:   03/06/18 210 lb (95.3 kg)   01/10/18 203 lb 11.3 oz (92.4 kg)   01/07/18 210 lb (95.3 kg)              We Performed the Following     CBC with platelets     JUST IN CASE     Renal panel (Alb, BUN, Ca, Cl, CO2, Creat, Gluc, Phos, K, Na)          Today's Medication Changes          These changes are accurate as of 3/6/18 10:45 AM.  If you have any questions, ask your nurse or doctor.               Stop taking these medicines if you haven't already. Please contact your  care team if you have questions.     acetaminophen 325 MG tablet   Commonly known as:  TYLENOL   Stopped by:  Bo Helton MD           levofloxacin 250 MG tablet   Commonly known as:  LEVAQUIN   Stopped by:  Bo Helton MD           ondansetron 4 MG tablet   Commonly known as:  ZOFRAN   Stopped by:  Bo Helton MD           oxyCODONE IR 5 MG tablet   Commonly known as:  ROXICODONE   Stopped by:  Bo Helton MD                    Primary Care Provider Office Phone # Fax #    Sallie Olsen [phone] [address] [place] [address]        Equal Access to Services     [facility]: Hadii aad jean marie hadasho Soonesimo, waaxda luqadaha, qaybta kaalmada adeegyada, chandrakant coleman . So [facility] [phone].    ATENCIÓN: Si habla español, tiene a coe disposición servicios gratuitos de asistencia lingüística. [facility] [phone].    We comply with applicable federal civil rights laws and [place] laws. We do not discriminate on the basis of race, color, national origin, age, disability, sex, sexual orientation, or gender identity.            Thank you!     Thank you for choosing [facility]  for your care. Our goal is always to provide you with excellent care. Hearing back from our patients is one way we can continue to improve our services. Please take a few minutes to complete the written survey that you may receive in the mail after your visit with us. Thank you!             Your Updated Medication List - Protect others around you: Learn how to safely use, store and throw away your medicines at www.disposemymeds.org.          This list is accurate as of 3/6/18 10:45 AM.  Always use your most recent med list.                   Brand Name Dispense Instructions for use Diagnosis    atorvastatin 10 MG tablet    LIPITOR    90 tablet    Take 1 tablet (10 mg) by mouth daily    Mixed hyperlipidemia       calcium acetate 667 MG  Caps capsule    PHOSLO    270 capsule    Take 3 capsules (2,001 mg) by mouth 3 times daily (with meals)    Hyperphosphatemia       DIALYVITE 800 0.8 MG Tabs     90 tablet    Take 1 tablet by mouth daily    ESRD (end stage renal disease) on dialysis (H)       fish oil-omega-3 fatty acids 1000 MG capsule      Take 1 capsule by mouth daily.        gentamicin 0.1 % cream    GARAMYCIN    30 g    Apply topically daily    Peritoneal dialysis catheter dysfunction, subsequent encounter (H)       senna 8.6 MG tablet    SENOKOT    100 tablet    Take 1 tablet by mouth daily    ESRD on peritoneal dialysis (H)       sevelamer 800 MG tablet    RENAGEL    90 tablet    Take 1 tablet (800 mg) by mouth 3 times daily (with meals)    Hyperphosphatemia       sodium bicarbonate 650 MG tablet     360 tablet    Take 2 tablets (1,300 mg) by mouth 2 times daily    Acidosis

## 2018-03-06 NOTE — ANESTHESIA PREPROCEDURE EVALUATION
Anesthesia Evaluation     . Pt has had prior anesthetic. Type: General    No history of anesthetic complications          ROS/MED HX    ENT/Pulmonary:  - neg pulmonary ROS     Neurologic:  - neg neurologic ROS     Cardiovascular:     (+) Dyslipidemia, hypertension-range: 120s/70s, ---. : . . . :. .       METS/Exercise Tolerance:     Hematologic:     (+) Anemia, -      Musculoskeletal:  - neg musculoskeletal ROS       GI/Hepatic: Comment: Recent appendficitis s/p lap appy 1/9/18        Renal/Genitourinary:     (+) chronic renal disease, type: CRI, Pt requires dialysis, type: Peritoneal dialysis, Pt has no history of transplant,       Endo:  - neg endo ROS       Psychiatric:  - neg psychiatric ROS       Infectious Disease:  - neg infectious disease ROS       Malignancy:      - no malignancy   Other:                     Physical Exam  Normal systems: cardiovascular, pulmonary and dental    Airway   Mallampati: II  TM distance: >3 FB  Neck ROM: full    Dental     Cardiovascular       Pulmonary                     Anesthesia Plan      History & Physical Review      ASA Status:  3 .        Plan for General and ETT with Intravenous induction. Maintenance will be Balanced.    PONV prophylaxis:  Ondansetron (or other 5HT-3)       Postoperative Care  Postoperative pain management:  IV analgesics and Oral pain medications.      Consents                          .

## 2018-03-06 NOTE — NURSING NOTE
"Chief Complaint   Patient presents with     Pre-Op Exam     DOS:3/7/18       Initial /74 (BP Location: Right arm, Patient Position: Sitting, Cuff Size: Adult Regular)  Pulse 90  Temp 98.3  F (36.8  C) (Oral)  Resp 18  Wt 210 lb (95.3 kg)  SpO2 97%  BMI 26.96 kg/m2 Estimated body mass index is 26.96 kg/(m^2) as calculated from the following:    Height as of 1/8/18: 6' 2\" (1.88 m).    Weight as of this encounter: 210 lb (95.3 kg).  Medication Reconciliation: complete   Violette Pedro MA      "

## 2018-03-07 ENCOUNTER — HOSPITAL ENCOUNTER (OUTPATIENT)
Facility: CLINIC | Age: 53
Discharge: HOME | End: 2018-03-07
Attending: TRANSPLANT SURGERY | Admitting: TRANSPLANT SURGERY
Payer: COMMERCIAL

## 2018-03-07 ENCOUNTER — ANESTHESIA (OUTPATIENT)
Dept: SURGERY | Facility: CLINIC | Age: 53
End: 2018-03-07
Payer: COMMERCIAL

## 2018-03-07 VITALS
OXYGEN SATURATION: 100 % | SYSTOLIC BLOOD PRESSURE: 116 MMHG | TEMPERATURE: 97.6 F | DIASTOLIC BLOOD PRESSURE: 89 MMHG | RESPIRATION RATE: 16 BRPM | WEIGHT: 214.73 LBS | HEIGHT: 74 IN | BODY MASS INDEX: 27.56 KG/M2

## 2018-03-07 DIAGNOSIS — N18.6 ESRD ON DIALYSIS (H): Primary | ICD-10-CM

## 2018-03-07 DIAGNOSIS — Z99.2 ESRD ON DIALYSIS (H): Primary | ICD-10-CM

## 2018-03-07 DIAGNOSIS — Z76.82 ORGAN TRANSPLANT CANDIDATE: ICD-10-CM

## 2018-03-07 DIAGNOSIS — E87.20 ACIDOSIS: ICD-10-CM

## 2018-03-07 DIAGNOSIS — K76.9 LESION OF LIVER: ICD-10-CM

## 2018-03-07 DIAGNOSIS — E78.5 HYPERLIPIDEMIA, UNSPECIFIED HYPERLIPIDEMIA TYPE: ICD-10-CM

## 2018-03-07 DIAGNOSIS — K35.30 ACUTE APPENDICITIS WITH LOCALIZED PERITONITIS: ICD-10-CM

## 2018-03-07 DIAGNOSIS — G25.81 RESTLESS LEG SYNDROME: ICD-10-CM

## 2018-03-07 DIAGNOSIS — R91.8 PULMONARY NODULES: ICD-10-CM

## 2018-03-07 DIAGNOSIS — D63.1 ANEMIA OF CHRONIC RENAL FAILURE, STAGE 5 (H): ICD-10-CM

## 2018-03-07 DIAGNOSIS — N18.5 CKD (CHRONIC KIDNEY DISEASE) STAGE 5, GFR LESS THAN 15 ML/MIN (H): ICD-10-CM

## 2018-03-07 DIAGNOSIS — E87.5 HYPERKALEMIA: ICD-10-CM

## 2018-03-07 DIAGNOSIS — N18.5 ANEMIA OF CHRONIC RENAL FAILURE, STAGE 5 (H): ICD-10-CM

## 2018-03-07 DIAGNOSIS — N25.81 SECONDARY RENAL HYPERPARATHYROIDISM (H): ICD-10-CM

## 2018-03-07 LAB
APTT PPP: 28 SEC (ref 22–37)
BASOPHILS # BLD AUTO: 0 10E9/L (ref 0–0.2)
BASOPHILS NFR BLD AUTO: 0.2 %
CREAT SERPL-MCNC: 8.48 MG/DL (ref 0.66–1.25)
DIFFERENTIAL METHOD BLD: ABNORMAL
EOSINOPHIL # BLD AUTO: 0.3 10E9/L (ref 0–0.7)
EOSINOPHIL NFR BLD AUTO: 5.6 %
ERYTHROCYTE [DISTWIDTH] IN BLOOD BY AUTOMATED COUNT: 15.8 % (ref 10–15)
GFR SERPL CREATININE-BSD FRML MDRD: 7 ML/MIN/1.7M2
GLUCOSE BLDC GLUCOMTR-MCNC: 97 MG/DL (ref 70–99)
GLUCOSE SERPL-MCNC: 93 MG/DL (ref 70–99)
HCT VFR BLD AUTO: 34.4 % (ref 40–53)
HGB BLD-MCNC: 10.7 G/DL (ref 13.3–17.7)
IMM GRANULOCYTES # BLD: 0 10E9/L (ref 0–0.4)
IMM GRANULOCYTES NFR BLD: 0.2 %
INR PPP: 0.9 (ref 0.86–1.14)
LYMPHOCYTES # BLD AUTO: 1.5 10E9/L (ref 0.8–5.3)
LYMPHOCYTES NFR BLD AUTO: 30.5 %
MCH RBC QN AUTO: 30.6 PG (ref 26.5–33)
MCHC RBC AUTO-ENTMCNC: 31.1 G/DL (ref 31.5–36.5)
MCV RBC AUTO: 98 FL (ref 78–100)
MONOCYTES # BLD AUTO: 0.3 10E9/L (ref 0–1.3)
MONOCYTES NFR BLD AUTO: 6.6 %
NEUTROPHILS # BLD AUTO: 2.8 10E9/L (ref 1.6–8.3)
NEUTROPHILS NFR BLD AUTO: 56.9 %
NRBC # BLD AUTO: 0 10*3/UL
NRBC BLD AUTO-RTO: 0 /100
PLATELET # BLD AUTO: 203 10E9/L (ref 150–450)
POTASSIUM SERPL-SCNC: 4.7 MMOL/L (ref 3.4–5.3)
RBC # BLD AUTO: 3.5 10E12/L (ref 4.4–5.9)
WBC # BLD AUTO: 5 10E9/L (ref 4–11)

## 2018-03-07 PROCEDURE — 27210794 ZZH OR GENERAL SUPPLY STERILE: Performed by: TRANSPLANT SURGERY

## 2018-03-07 PROCEDURE — 71000027 ZZH RECOVERY PHASE 2 EACH 15 MINS: Performed by: TRANSPLANT SURGERY

## 2018-03-07 PROCEDURE — 40000170 ZZH STATISTIC PRE-PROCEDURE ASSESSMENT II: Performed by: TRANSPLANT SURGERY

## 2018-03-07 PROCEDURE — C1769 GUIDE WIRE: HCPCS | Performed by: TRANSPLANT SURGERY

## 2018-03-07 PROCEDURE — 25000125 ZZHC RX 250: Performed by: NURSE ANESTHETIST, CERTIFIED REGISTERED

## 2018-03-07 PROCEDURE — 85730 THROMBOPLASTIN TIME PARTIAL: CPT | Performed by: CLINICAL NURSE SPECIALIST

## 2018-03-07 PROCEDURE — 85610 PROTHROMBIN TIME: CPT | Performed by: CLINICAL NURSE SPECIALIST

## 2018-03-07 PROCEDURE — 25000125 ZZHC RX 250: Performed by: TRANSPLANT SURGERY

## 2018-03-07 PROCEDURE — 25000128 H RX IP 250 OP 636: Performed by: NURSE ANESTHETIST, CERTIFIED REGISTERED

## 2018-03-07 PROCEDURE — 25000132 ZZH RX MED GY IP 250 OP 250 PS 637: Performed by: ANESTHESIOLOGY

## 2018-03-07 PROCEDURE — 82947 ASSAY GLUCOSE BLOOD QUANT: CPT | Performed by: CLINICAL NURSE SPECIALIST

## 2018-03-07 PROCEDURE — 25000565 ZZH ISOFLURANE, EA 15 MIN: Performed by: TRANSPLANT SURGERY

## 2018-03-07 PROCEDURE — 71000014 ZZH RECOVERY PHASE 1 LEVEL 2 FIRST HR: Performed by: TRANSPLANT SURGERY

## 2018-03-07 PROCEDURE — 84132 ASSAY OF SERUM POTASSIUM: CPT | Performed by: CLINICAL NURSE SPECIALIST

## 2018-03-07 PROCEDURE — 25000128 H RX IP 250 OP 636: Performed by: CLINICAL NURSE SPECIALIST

## 2018-03-07 PROCEDURE — 85025 COMPLETE CBC W/AUTO DIFF WBC: CPT | Performed by: CLINICAL NURSE SPECIALIST

## 2018-03-07 PROCEDURE — 25000128 H RX IP 250 OP 636: Performed by: ANESTHESIOLOGY

## 2018-03-07 PROCEDURE — 82962 GLUCOSE BLOOD TEST: CPT

## 2018-03-07 PROCEDURE — 36000059 ZZH SURGERY LEVEL 3 EA 15 ADDTL MIN UMMC: Performed by: TRANSPLANT SURGERY

## 2018-03-07 PROCEDURE — 37000009 ZZH ANESTHESIA TECHNICAL FEE, EACH ADDTL 15 MIN: Performed by: TRANSPLANT SURGERY

## 2018-03-07 PROCEDURE — 36000057 ZZH SURGERY LEVEL 3 1ST 30 MIN - UMMC: Performed by: TRANSPLANT SURGERY

## 2018-03-07 PROCEDURE — 82565 ASSAY OF CREATININE: CPT | Performed by: CLINICAL NURSE SPECIALIST

## 2018-03-07 PROCEDURE — 37000008 ZZH ANESTHESIA TECHNICAL FEE, 1ST 30 MIN: Performed by: TRANSPLANT SURGERY

## 2018-03-07 RX ORDER — OXYCODONE HYDROCHLORIDE 5 MG/1
5-10 TABLET ORAL EVERY 6 HOURS PRN
Qty: 18 TABLET | Refills: 0 | Status: SHIPPED | OUTPATIENT
Start: 2018-03-07 | End: 2018-04-05

## 2018-03-07 RX ORDER — SODIUM CHLORIDE, SODIUM LACTATE, POTASSIUM CHLORIDE, CALCIUM CHLORIDE 600; 310; 30; 20 MG/100ML; MG/100ML; MG/100ML; MG/100ML
INJECTION, SOLUTION INTRAVENOUS CONTINUOUS
Status: DISCONTINUED | OUTPATIENT
Start: 2018-03-07 | End: 2018-03-07 | Stop reason: HOSPADM

## 2018-03-07 RX ORDER — GLYCOPYRROLATE 0.2 MG/ML
INJECTION, SOLUTION INTRAMUSCULAR; INTRAVENOUS PRN
Status: DISCONTINUED | OUTPATIENT
Start: 2018-03-07 | End: 2018-03-07

## 2018-03-07 RX ORDER — ACETAMINOPHEN 325 MG/1
650 TABLET ORAL EVERY 4 HOURS PRN
Qty: 100 TABLET | Refills: 0 | Status: SHIPPED | OUTPATIENT
Start: 2018-03-07 | End: 2018-04-05

## 2018-03-07 RX ORDER — CEFAZOLIN SODIUM 2 G/100ML
2 INJECTION, SOLUTION INTRAVENOUS
Status: COMPLETED | OUTPATIENT
Start: 2018-03-07 | End: 2018-03-07

## 2018-03-07 RX ORDER — EPHEDRINE SULFATE 50 MG/ML
INJECTION, SOLUTION INTRAMUSCULAR; INTRAVENOUS; SUBCUTANEOUS PRN
Status: DISCONTINUED | OUTPATIENT
Start: 2018-03-07 | End: 2018-03-07

## 2018-03-07 RX ORDER — NALOXONE HYDROCHLORIDE 0.4 MG/ML
.1-.4 INJECTION, SOLUTION INTRAMUSCULAR; INTRAVENOUS; SUBCUTANEOUS
Status: DISCONTINUED | OUTPATIENT
Start: 2018-03-07 | End: 2018-03-07 | Stop reason: HOSPADM

## 2018-03-07 RX ORDER — ONDANSETRON 2 MG/ML
INJECTION INTRAMUSCULAR; INTRAVENOUS PRN
Status: DISCONTINUED | OUTPATIENT
Start: 2018-03-07 | End: 2018-03-07

## 2018-03-07 RX ORDER — NEOSTIGMINE METHYLSULFATE 1 MG/ML
VIAL (ML) INJECTION PRN
Status: DISCONTINUED | OUTPATIENT
Start: 2018-03-07 | End: 2018-03-07

## 2018-03-07 RX ORDER — FENTANYL CITRATE 50 UG/ML
INJECTION, SOLUTION INTRAMUSCULAR; INTRAVENOUS PRN
Status: DISCONTINUED | OUTPATIENT
Start: 2018-03-07 | End: 2018-03-07

## 2018-03-07 RX ORDER — LIDOCAINE 40 MG/G
CREAM TOPICAL
Status: DISCONTINUED | OUTPATIENT
Start: 2018-03-07 | End: 2018-03-07 | Stop reason: HOSPADM

## 2018-03-07 RX ORDER — PROPOFOL 10 MG/ML
INJECTION, EMULSION INTRAVENOUS PRN
Status: DISCONTINUED | OUTPATIENT
Start: 2018-03-07 | End: 2018-03-07

## 2018-03-07 RX ORDER — ONDANSETRON 2 MG/ML
4 INJECTION INTRAMUSCULAR; INTRAVENOUS EVERY 30 MIN PRN
Status: DISCONTINUED | OUTPATIENT
Start: 2018-03-07 | End: 2018-03-07 | Stop reason: HOSPADM

## 2018-03-07 RX ORDER — LIDOCAINE HYDROCHLORIDE 20 MG/ML
INJECTION, SOLUTION INFILTRATION; PERINEURAL PRN
Status: DISCONTINUED | OUTPATIENT
Start: 2018-03-07 | End: 2018-03-07

## 2018-03-07 RX ORDER — ACETAMINOPHEN 325 MG/1
975 TABLET ORAL ONCE
Status: COMPLETED | OUTPATIENT
Start: 2018-03-07 | End: 2018-03-07

## 2018-03-07 RX ORDER — ACETAMINOPHEN 325 MG/1
325 TABLET ORAL EVERY 4 HOURS PRN
Status: DISCONTINUED | OUTPATIENT
Start: 2018-03-07 | End: 2018-03-07 | Stop reason: HOSPADM

## 2018-03-07 RX ORDER — LABETALOL HYDROCHLORIDE 5 MG/ML
INJECTION, SOLUTION INTRAVENOUS PRN
Status: DISCONTINUED | OUTPATIENT
Start: 2018-03-07 | End: 2018-03-07

## 2018-03-07 RX ORDER — FENTANYL CITRATE 50 UG/ML
25-50 INJECTION, SOLUTION INTRAMUSCULAR; INTRAVENOUS
Status: DISCONTINUED | OUTPATIENT
Start: 2018-03-07 | End: 2018-03-07 | Stop reason: HOSPADM

## 2018-03-07 RX ORDER — LEVOFLOXACIN 5 MG/ML
INJECTION, SOLUTION INTRAVENOUS PRN
Status: DISCONTINUED | OUTPATIENT
Start: 2018-03-07 | End: 2018-03-07

## 2018-03-07 RX ORDER — MEPERIDINE HYDROCHLORIDE 50 MG/ML
12.5 INJECTION INTRAMUSCULAR; INTRAVENOUS; SUBCUTANEOUS
Status: DISCONTINUED | OUTPATIENT
Start: 2018-03-07 | End: 2018-03-07 | Stop reason: HOSPADM

## 2018-03-07 RX ORDER — ONDANSETRON 4 MG/1
4 TABLET, ORALLY DISINTEGRATING ORAL EVERY 30 MIN PRN
Status: DISCONTINUED | OUTPATIENT
Start: 2018-03-07 | End: 2018-03-07 | Stop reason: HOSPADM

## 2018-03-07 RX ADMIN — NEOSTIGMINE METHYLSULFATE 5 MG: 1 INJECTION, SOLUTION INTRAVENOUS at 16:57

## 2018-03-07 RX ADMIN — PHENYLEPHRINE HYDROCHLORIDE 100 MCG: 10 INJECTION, SOLUTION INTRAMUSCULAR; INTRAVENOUS; SUBCUTANEOUS at 14:59

## 2018-03-07 RX ADMIN — PHENYLEPHRINE HYDROCHLORIDE 100 MCG: 10 INJECTION, SOLUTION INTRAMUSCULAR; INTRAVENOUS; SUBCUTANEOUS at 15:41

## 2018-03-07 RX ADMIN — ROCURONIUM BROMIDE 10 MG: 10 INJECTION INTRAVENOUS at 15:48

## 2018-03-07 RX ADMIN — PROPOFOL 40 MG: 10 INJECTION, EMULSION INTRAVENOUS at 16:20

## 2018-03-07 RX ADMIN — Medication 5 MG: at 15:47

## 2018-03-07 RX ADMIN — ACETAMINOPHEN 975 MG: 325 TABLET, FILM COATED ORAL at 17:52

## 2018-03-07 RX ADMIN — CEFAZOLIN SODIUM 2 G: 2 INJECTION, SOLUTION INTRAVENOUS at 15:00

## 2018-03-07 RX ADMIN — PHENYLEPHRINE HYDROCHLORIDE 100 MCG: 10 INJECTION, SOLUTION INTRAMUSCULAR; INTRAVENOUS; SUBCUTANEOUS at 15:20

## 2018-03-07 RX ADMIN — FENTANYL CITRATE 50 MCG: 50 INJECTION, SOLUTION INTRAMUSCULAR; INTRAVENOUS at 16:17

## 2018-03-07 RX ADMIN — GLYCOPYRROLATE 1 MG: 0.2 INJECTION, SOLUTION INTRAMUSCULAR; INTRAVENOUS at 16:57

## 2018-03-07 RX ADMIN — PHENYLEPHRINE HYDROCHLORIDE 100 MCG: 10 INJECTION, SOLUTION INTRAMUSCULAR; INTRAVENOUS; SUBCUTANEOUS at 15:26

## 2018-03-07 RX ADMIN — FENTANYL CITRATE 25 MCG: 50 INJECTION, SOLUTION INTRAMUSCULAR; INTRAVENOUS at 17:51

## 2018-03-07 RX ADMIN — PHENYLEPHRINE HYDROCHLORIDE 200 MCG: 10 INJECTION, SOLUTION INTRAMUSCULAR; INTRAVENOUS; SUBCUTANEOUS at 15:35

## 2018-03-07 RX ADMIN — ROCURONIUM BROMIDE 10 MG: 10 INJECTION INTRAVENOUS at 16:44

## 2018-03-07 RX ADMIN — LIDOCAINE HYDROCHLORIDE 100 MG: 20 INJECTION, SOLUTION INFILTRATION; PERINEURAL at 14:46

## 2018-03-07 RX ADMIN — FENTANYL CITRATE 50 MCG: 50 INJECTION, SOLUTION INTRAMUSCULAR; INTRAVENOUS at 16:10

## 2018-03-07 RX ADMIN — ONDANSETRON 4 MG: 2 INJECTION INTRAMUSCULAR; INTRAVENOUS at 16:54

## 2018-03-07 RX ADMIN — FENTANYL CITRATE 150 MCG: 50 INJECTION, SOLUTION INTRAMUSCULAR; INTRAVENOUS at 14:46

## 2018-03-07 RX ADMIN — FENTANYL CITRATE 50 MCG: 50 INJECTION, SOLUTION INTRAMUSCULAR; INTRAVENOUS at 15:11

## 2018-03-07 RX ADMIN — LABETALOL HYDROCHLORIDE 5 MG: 5 INJECTION INTRAVENOUS at 16:29

## 2018-03-07 RX ADMIN — ROCURONIUM BROMIDE 20 MG: 10 INJECTION INTRAVENOUS at 15:15

## 2018-03-07 RX ADMIN — SODIUM CHLORIDE, POTASSIUM CHLORIDE, SODIUM LACTATE AND CALCIUM CHLORIDE: 600; 310; 30; 20 INJECTION, SOLUTION INTRAVENOUS at 14:34

## 2018-03-07 RX ADMIN — ROCURONIUM BROMIDE 20 MG: 10 INJECTION INTRAVENOUS at 16:07

## 2018-03-07 RX ADMIN — FENTANYL CITRATE 50 MCG: 50 INJECTION, SOLUTION INTRAMUSCULAR; INTRAVENOUS at 16:58

## 2018-03-07 RX ADMIN — LABETALOL HYDROCHLORIDE 5 MG: 5 INJECTION INTRAVENOUS at 16:43

## 2018-03-07 RX ADMIN — PROPOFOL 160 MG: 10 INJECTION, EMULSION INTRAVENOUS at 14:46

## 2018-03-07 RX ADMIN — LEVOFLOXACIN 500 MG: 5 INJECTION, SOLUTION INTRAVENOUS at 17:00

## 2018-03-07 RX ADMIN — ROCURONIUM BROMIDE 50 MG: 10 INJECTION INTRAVENOUS at 14:46

## 2018-03-07 RX ADMIN — MIDAZOLAM 2 MG: 1 INJECTION INTRAMUSCULAR; INTRAVENOUS at 14:34

## 2018-03-07 NOTE — OR NURSING
Difficulties putting in the Steven Catheter and Surgeon tried but did not succeed. Urology was paged. Dr. Mario Awad came in and put in the catheter with a flexible cystoscope.

## 2018-03-07 NOTE — IP AVS SNAPSHOT
Same Day Surgery [address] [address]    Phone:  [phone]                                       After Visit Summary   3/7/2018    Cesar Villalobos    MRN: [id number]           After Visit Summary Signature Page     I have received my discharge instructions, and my questions have been answered. I have discussed any challenges I see with this plan with the nurse or doctor.    ..........................................................................................................................................  Patient/Patient Representative Signature      ..........................................................................................................................................  Patient Representative Print Name and Relationship to Patient    ..................................................               ................................................  Date                                            Time    ..........................................................................................................................................  Reviewed by Signature/Title    ...................................................              ..............................................  Date                                                            Time

## 2018-03-07 NOTE — ANESTHESIA POSTPROCEDURE EVALUATION
Patient: Cesar Villalobos    Procedure(s):  Laparoscopic Removal and Re-insertion Of Peritoneal Dialysis Catheter  - Wound Class: I-Clean    Diagnosis:Dialysis Complications   Diagnosis Additional Information: No value filed.    Anesthesia Type:  General, ETT    Note:  Anesthesia Post Evaluation    Patient location during evaluation: PACU  Patient participation: Able to fully participate in evaluation  Level of consciousness: awake and alert  Pain management: adequate  Airway patency: patent  Cardiovascular status: acceptable  Respiratory status: acceptable  Hydration status: acceptable  PONV: none             Last vitals:  Vitals:    03/07/18 1223 03/07/18 1715 03/07/18 1730   BP: 134/90 (!) 146/92 128/85   Resp: 16 14 16   Temp: 36.8  C (98.2  F) 36.5  C (97.7  F) 36.3  C (97.3  F)   SpO2: 96% 100% 100%         Electronically Signed By: Guillermo Madrid MD  March 7, 2018  5:50 PM

## 2018-03-07 NOTE — IP AVS SNAPSHOT
MRN:[id number]                      After Visit Summary   3/7/2018    Cesar Villalobos    MRN: [id number]           Thank you!     Thank you for choosing [facility] for your care. Our goal is always to provide you with excellent care. Hearing back from our patients is one way we can continue to improve our services. Please take a few minutes to complete the written survey that you may receive in the mail after you visit with us. Thank you!        Patient Information     Date Of Birth          1965        About your hospital stay     You were admitted on:  March 7, 2018 You last received care in the:  [facility]    You were discharged on:  March 7, 2018        Reason for your hospital stay       Removal of PD catheter and reinsertion of a new PD catheter                  Who to Call     For medical emergencies, please call 911.  For non-urgent questions about your medical care, please call your primary care provider or clinic, [phone]  For questions related to your surgery, please call your surgery clinic        Attending Provider     Provider Specialty    Finger, Caden Quiñonez MD Transplant       Primary Care Provider Office Phone # Fax #    Sallie Olsen [phone]       When to contact your care team       Severe abdominal pain, abdominal distension with nausea and vomitting  Dialysate that is cloudy or bloody when it drains from your body  Part of your belly appears to be bulging out (this is called a hernia)  Severe coughing                  After Care Instructions     Activity       Your activity upon discharge: activity as tolerated and no driving for today            Diet       Follow this diet upon discharge: Regular            Discharge Instructions       Peritoneal Dialysis Catheter post op discharge order/instructions   A peritoneal dialysis catheter has been placed into your abdomen (belly). The following are instructions for care of your  catheter:  Keep your catheter insertion sites dry and covered at all times. To prevent infection, NEVER REMOVE DRESSING OR PEEK UNDER THE DRESSING. The home dialysis nurse will change your dressing the first time. You will be trained on how to change your dressing.  If the dressing becomes wet from bloody drainage, add 4x4 sterile gauze with tape.  If your dressing becomes soaked, call the home dialysis nurse in your area  DO NOT take a shower while the catheter site is healing. This usually takes 2-4 weeks. Take sponge baths only. You CANNOT ever take a tub bath or swimming while you have a peritoneal dialysis catheter.  The catheter should be kept in place at all times. This is done by having the catheter taped down to your skin to avoid tugging or pulling.   Activity guidelines:  Do not lift anything over 20 lbs for the first month  Avoid climbing stairs it at all possible  Avoid getting constipated or straining with your bowel movements  Take your medicines as ordered by your doctor.  Call the home dialysis unit in your area to schedule an appointment to have your dressing changed and education on the care of your catheter.  CALL YOUR HEALTH CARE PROVIDER RIGHT AWAY IF YOU HAVE ANY OF THE FOLLOWING:  Fever of 100.4 F (38.0 C) or higher  Chills  Pain in your abdomen or around your catheter  Hardness, warmth, redness or drainage around you catheter  Block flow into or out of your catheter            Tubes and drains       Steven catheter to stay for 5 days, to be assessed on Monday at clinic                  Follow-up Appointments     Adult [facility] Follow-up and recommended labs and tests       Follow up with Dr. Adrian Tay , at (location with clinic name or city) Transplant surgery clinic, on Monday 03/11/18  to evaluate after surgery. No follow up labs or test are needed.    Appointments on [place] and/or [facility] (with [facility] or [facility] provider or service). Call [phone] if you haven't heard  regarding these appointments within 7 days of discharge.                  Your next 10 appointments already scheduled     Mar 21, 2018  1:30 PM CDT   (Arrive by 1:15 PM)   Post-Op with RUPA Muñoz [facility] ([facility])    [address] [place] [address]  [address] [place] [id number] [phone]              Further instructions from your care team       [facility] [place]  Same-Day Surgery   Adult Discharge Orders & Instructions     For 24 hours after surgery    1. Get plenty of rest.  A responsible adult must stay with you for at least 24 hours after you leave the hospital.   2. Do not drive or use heavy equipment.  If you have weakness or tingling, don't drive or use heavy equipment until this feeling goes away.  3. Do not drink alcohol.  4. Avoid strenuous or risky activities.  Ask for help when climbing stairs.   5. You may feel lightheaded.  IF so, sit for a few minutes before standing.  Have someone help you get up.   6. If you have nausea (feel sick to your stomach): Drink only clear liquids such as apple juice, ginger ale, broth or 7-Up.  Rest may also help.  Be sure to drink enough fluids.  Move to a regular diet as you feel able.  7. You may have a slight fever. Call the doctor if your fever is over 100 F (37.7 C) (taken under the tongue) or lasts longer than 24 hours.  8. You may have a dry mouth, a sore throat, muscle aches or trouble sleeping.  These should go away after 24 hours.  9. Do not make important or legal decisions.   Call your doctor for any of the followin.  Signs of infection (fever, growing tenderness at the surgery site, a large amount of drainage or bleeding, severe pain, foul-smelling drainage, redness, swelling).    2. It has been over 8 to 10 hours since surgery and you are still not able to urinate (pass water).    3.  Headache for over 24 hours.      To contact a doctor, call   "Finger's office at [phone] or:        [phone] and ask for the resident on call for General Surgery (answered 24 hours a day)      Emergency Department:    [facility]: [phone]       (TTY for hearing impaired: [phone])               Tips for taking pain medications  To get the best pain relief possible , remember these points:      Take pain medications as directed, before pain becomes severe      Pain medication can upset your stomach: taking it with food may help      Constipation is a common side effect of pain medication. Drink plenty of  Fluids      Eat foods high in fiber. Take a stool softener  if recommended by your doctor or  Pharmacist.        Do not drink alcohol, drive or operate machinery while taking pain medications.      Ask about other ways to control pain, such as with heat, ice or relaxation.      Pending Results     No orders found from 3/5/2018 to 3/8/2018.            Admission Information     Date & Time Provider Department Dept. Phone    3/7/2018 Caden Tay MD Same Day Surgery [facility] [phone]      Your Vitals Were     Blood Pressure Temperature Respirations Height Weight Pulse Oximetry    122/76 97.6  F (36.4  C) (Oral) 16 1.88 m (6' 2\") 97.4 kg (214 lb 11.7 oz) 98%    BMI (Body Mass Index)                   27.57 kg/m2           MyChart Information     [employer] gives you secure access to your electronic health record. If you see a primary care provider, you can also send messages to your care team and make appointments. If you have questions, please call your primary care clinic.  If you do not have a primary care provider, please call [phone] and they will assist you.        Care EveryWhere ID     This is your Care EveryWhere ID. This could be used by other organizations to access your [facility] medical records  [id number]        Equal Access to Services     COLLEEN DEWEY AH: luciano Sage, dontae amin " "chandrakant burris alexshireen carrasco. So [facility] [phone].    ATENCIÓN: Si habla davon, tiene a coe disposición servicios gratuitos de asistencia lingüística. Llpeggy al [phone].    We comply with applicable federal civil rights laws and [place] laws. We do not discriminate on the basis of race, color, national origin, age, disability, sex, sexual orientation, or gender identity.            DAVS: Troubleshooting Information     AVS troubleshooting information  Refreshed 2018-03-07 18:56:45 CST    Data Value    Patient ID, internal format \"   [id number]\"    Patient FAUSTINA of contact being viewed [id number]    Patient CSN of contact being viewed [id number]    Mode OP_MEDS    Discharge IEV record [id number]    Contact type INP          DAVS: Context  IP    Data Value       Basic    LPG ID FV IP DISCHARGE AVS MED LIST NEW 2013 W RX RECEIPT [[id number]]    pData ^Eprivate(56)    Calling context AVS_ACTIVITY    Filter LPP IP MED SEARCH DISCHARGE FILTER [[id number]]    Filter code $$FilterNonDischDisc^LMIUTIL1(ordId,eptID,eptDAT)       Inpatient    Is admission contact Yes [1]    Contact has been admitted Yes [1]    Contact has been discharged No [0]    Is on Leave of Absence No [0]    Admission IEV [id number]    Discharge IEV [id number]    FAUSTINA of patient's current admission [id number]       Emergency Department    IsED (contact) No [0]    IsCurED (patient) No [0]    PatientHasArrivedInED (only applies to ED contacts) No [0]       Hospital Outpatient    IsHOV No [0]    Appt status \"\"    ADT status Admitted    HOV as IP or AMB Treat as inpatient       Ambulatory    Is Ambulatory Contact No [0]    Is Encounter Closed No [0]       Surgery    Is surgery (type 51) contact? No [0]    Has linked surgery? Yes [1]    Is thin log? No [0]    Surgery FAUSTINA 63055    Linked ORL Log [id number] [[id number]]    Linked ORC Case [id number] [[id number]]          DAVS: Snapshot  Live    Data Value    Showing a snapshot? No [0]    Snapshot strategy As of now [1]    " "Original snapshot strategy As of now [1]    IEV record [id number]    Most recent snapshot line for patient (in \"OP_MEDS\" mode) \"\"    Absolute last snapshot line (in \"OP_MEDS\" mode) \"\"          DAVS: Generating the med list     ID Order Details    [id number] fish oil-omega-3 fatty acids (FISH OIL) 1000 MG capsule Found by the discharge search as a PTA order.    [id number] sevelamer (RENAGEL) 800 MG tablet Found by the discharge search as a PTA order.    [id number] sodium bicarbonate 650 MG tablet Found by the discharge search as a PTA order.    [id number] atorvastatin (LIPITOR) 10 MG tablet Found by the discharge search as a PTA order.    [id number] B Complex-C-Folic Acid (DIALYVITE 800) 0.8 MG TABS Found by the discharge search as a PTA order.    [id number] calcium acetate (PHOSLO) 667 MG CAPS capsule Found by the discharge search as a PTA order.    [id number] gentamicin (GARAMYCIN) 0.1 % cream Found by the discharge search as a PTA order.    [id number] senna (SENOKOT) 8.6 MG tablet Found by the discharge search as a PTA order.    [id number] LISINOPRIL PO Found by the discharge search as a PTA order.    [id number] DILTIAZEM HCL PO Found by the discharge search as a PTA order.    [id number] oxyCODONE IR (ROXICODONE) 5 MG tablet Found by the discharge search as a non-PTA order.    [id number] acetaminophen (TYLENOL) 325 MG tablet Found by the discharge search as a non-PTA order.          DAVS: Med list     Order Name In ORDERS node? In PTA_MEDS node?    [id number] fish oil-omega-3 fatty acids (FISH OIL) 1000 MG capsule YES YES    [id number] sevelamer (RENAGEL) 800 MG tablet YES YES    [id number] sodium bicarbonate 650 MG tablet YES YES    [id number] atorvastatin (LIPITOR) 10 MG tablet YES YES    [id number] B Complex-C-Folic Acid (DIALYVITE 800) 0.8 MG TABS YES YES    [id number] calcium acetate (PHOSLO) 667 MG CAPS capsule YES YES    [id number] gentamicin (GARAMYCIN) 0.1 % cream YES YES    [id number] senna (SENOKOT) 8.6 MG tablet YES YES    " [id number] LISINOPRIL PO YES YES    [id number] DILTIAZEM HCL PO YES YES    [id number] oxyCODONE IR (ROXICODONE) 5 MG tablet YES NO    [id number] acetaminophen (TYLENOL) 325 MG tablet YES NO    (12 orders in ORDERS; 10 orders in PTA_MEDS)          DAVS: Pairing PTA orders with non-PTA orders (show by pair)     Pair # PTA Name Take-home Name    1 [id number] fish oil-omega-3 fatty acids (FISH OIL) 1000 MG capsule      2 [id number] sevelamer (RENAGEL) 800 MG tablet      3 [id number] sodium bicarbonate 650 MG tablet      4 [id number] atorvastatin (LIPITOR) 10 MG tablet      5 [id number] B Complex-C-Folic Acid (DIALYVITE 800) 0.8 MG TABS      6 [id number] calcium acetate (PHOSLO) 667 MG CAPS capsule      7 [id number] gentamicin (GARAMYCIN) 0.1 % cream      8 [id number] senna (SENOKOT) 8.6 MG tablet      9 [id number] LISINOPRIL PO      10 [id number] DILTIAZEM HCL PO      11   [id number] oxyCODONE IR (ROXICODONE) 5 MG tablet    12   [id number] acetaminophen (TYLENOL) 325 MG tablet          DAVS: Pairing PTA orders with non-PTA orders (show by order)     Order Name In pair? PTA pairs Take Home pairs    [id number] fish oil-omega-3 fatty acids (FISH OIL) 1000 MG capsule Yes 1     [id number] sevelamer (RENAGEL) 800 MG tablet Yes 2     [id number] sodium bicarbonate 650 MG tablet Yes 3     [id number] atorvastatin (LIPITOR) 10 MG tablet Yes 4     [id number] B Complex-C-Folic Acid (DIALYVITE 800) 0.8 MG TABS Yes 5     [id number] calcium acetate (PHOSLO) 667 MG CAPS capsule Yes 6     [id number] gentamicin (GARAMYCIN) 0.1 % cream Yes 7     [id number] senna (SENOKOT) 8.6 MG tablet Yes 8     [id number] LISINOPRIL PO Yes 9     [id number] DILTIAZEM HCL PO Yes 10     [id number] oxyCODONE IR (ROXICODONE) 5 MG tablet Yes  11    [id number] acetaminophen (TYLENOL) 325 MG tablet Yes  12    (12 orders)          DAVS: Calculating dispositions (first pass)     Pair PTA ID Action Details Taking? Disposition TH ID Action Details Taking? Disposition    1 [id number] Resume at  Discharge [152] lpp-allow Yes [1] CONTINUE         2 [id number] Resume at Discharge [152] lpp-allow Yes [1] CONTINUE         3 [id number] Resume at Discharge [152] lpp-allow Yes [1] CONTINUE         4 [id number] Resume at Discharge [152] LT, lpp-allow Yes [1] CONTINUE         5 [id number] Resume at Discharge [152] lpp-allow Yes [1] CONTINUE         6 [id number] Resume at Discharge [152] lpp-allow Yes [1] CONTINUE         7 [id number] Resume at Discharge [152] lpp-allow Yes [1] CONTINUE         8 [id number] Resume at Discharge [152] lpp-allow Yes [1] CONTINUE         9 [id number] Resume at Discharge [152] lpp-allow Yes [1] CONTINUE         10 [id number] Resume at Discharge [152] lpp-allow Yes [1] CONTINUE         11      [id number] New at Discharge [155]  Yes [1] START    12      [id number] New at Discharge [155]  Yes [1] START    Key     Mnemonic Meaning    comb-from a STOP order that was combined with a START order    comb-to a START order that was combined with a STOP order    conc-enc from an encounter concurrent to this one    disc discontinued    dup-stop filtered out because it was a STOP order that duplicated another order    exp     fut-disc discontinued with a future discontinue date    lpp-allow the filter LPP decided not to hide this order    lpp-hide the filter LPP decided to hide this order    lpp-skip the order was not evaluated by the filter LPP because the order is part of a modification    LT medication is marked as long-term    no-rev-req no review required    quest unclear whether the order should be on the PTA list    unrev not reviewed for discharge                DAVS: Equivalency group adjustments     Group: Acetaminophen ([id number])     Order Old disposition New disposition Other Order    [id number] START --Unchanged--           Group: OxyCODONE HCl ([id number])     Order Old disposition New disposition Other Order    [id number] START --Unchanged--           Group: Sennosides ([id number])     Order Old  disposition New disposition Other Order    [id number] CONTINUE --Unchanged--           Group: B Complex-C-Folic Acid ([id number])     Order Old disposition New disposition Other Order    [id number] CONTINUE --Unchanged--           Group: Calcium Acetate (Phos Binder) ([id number])     Order Old disposition New disposition Other Order    [id number] CONTINUE --Unchanged--           Group: Lisinopril ([id number])     Order Old disposition New disposition Other Order    [id number] CONTINUE --Unchanged--           Group: DilTIAZem HCl ([id number])     Order Old disposition New disposition Other Order    [id number] CONTINUE --Unchanged--           Group: Atorvastatin Calcium ([id number])     Order Old disposition New disposition Other Order    [id number] CONTINUE --Unchanged--           Group: Omega-3 Fatty Acids ([id number])     Order Old disposition New disposition Other Order    [id number] CONTINUE --Unchanged--           Group: Gentamicin Sulfate ([id number])     Order Old disposition New disposition Other Order    [id number] CONTINUE --Unchanged--           Group: Sevelamer HCl ([id number])     Order Old disposition New disposition Other Order    [id number] CONTINUE --Unchanged--           Group: Sodium Bicarbonate ([id number])     Order Old disposition New disposition Other Order    [id number] CONTINUE --Unchanged--                 DAVS: Calculating dispositions (second pass)     Pair PTA ID Action Details Taking? Disposition TH ID Action Details Taking? Disposition    1 [id number] Resume at Discharge [152] lpp-allow Yes [1] CONTINUE         2 [id number] Resume at Discharge [152] lpp-allow Yes [1] CONTINUE         3 [id number] Resume at Discharge [152] lpp-allow Yes [1] CONTINUE         4 [id number] Resume at Discharge [152] LT, lpp-allow Yes [1] CONTINUE         5 [id number] Resume at Discharge [152] lpp-allow Yes [1] CONTINUE         6 [id number] Resume at Discharge [152] lpp-allow Yes [1] CONTINUE         7 [id number] Resume at Discharge  [152] lpp-allow Yes [1] CONTINUE         8 [id number] Resume at Discharge [152] lpp-allow Yes [1] CONTINUE         9 [id number] Resume at Discharge [152] lpp-allow Yes [1] CONTINUE         10 [id number] Resume at Discharge [152] lpp-allow Yes [1] CONTINUE         11      [id number] New at Discharge [155]  Yes [1] START    12      [id number] New at Discharge [155]  Yes [1] START    Key     Mnemonic Meaning    comb-from a STOP order that was combined with a START order    comb-to a START order that was combined with a STOP order    conc-enc from an encounter concurrent to this one    disc discontinued    dup-stop filtered out because it was a STOP order that duplicated another order    exp     fut-disc discontinued with a future discontinue date    lpp-allow the filter LPP decided not to hide this order    lpp-hide the filter LPP decided to hide this order    lpp-skip the order was not evaluated by the filter LPP because the order is part of a modification    LT medication is marked as long-term    no-rev-req no review required    quest unclear whether the order should be on the PTA list    unrev not reviewed for discharge                     Review of your medicines      START taking        Dose / Directions    acetaminophen 325 MG tablet   Commonly known as:  TYLENOL   Used for:  ESRD on dialysis (H)   Order ID: [id number]        Dose:  650 mg   Take 2 tablets (650 mg) by mouth every 4 hours as needed for mild pain   Quantity:  100 tablet   Refills:  0       oxyCODONE IR 5 MG tablet   Commonly known as:  ROXICODONE   Used for:  ESRD on dialysis (H)   Order ID: [id number]        Dose:  5-10 mg   Take 1-2 tablets (5-10 mg) by mouth every 6 hours as needed for pain maximum 6 tablet(s) per day   Quantity:  18 tablet   Refills:  0         CONTINUE these medicines which have NOT CHANGED        Dose / Directions    atorvastatin 10 MG tablet   Commonly known as:  LIPITOR   Used for:  Mixed hyperlipidemia   Order ID:  [id number]        Dose:  10 mg   Take 1 tablet (10 mg) by mouth daily   Quantity:  90 tablet   Refills:  3       calcium acetate 667 MG Caps capsule   Commonly known as:  PHOSLO   Used for:  Hyperphosphatemia   Order ID: [id number]        Dose:  2001 mg   Take 3 capsules (2,001 mg) by mouth 3 times daily (with meals)   Quantity:  270 capsule   Refills:  11       DIALYVITE 800 0.8 MG Tabs   Used for:  ESRD (end stage renal disease) on dialysis (H)   Order ID: [id number]        Dose:  1 tablet   Take 1 tablet by mouth daily   Quantity:  90 tablet   Refills:  3       DILTIAZEM HCL PO   Order ID: [id number]        Dose:  180 mg   Take 180 mg by mouth daily   Refills:  0       fish oil-omega-3 fatty acids 1000 MG capsule   Order ID: [id number]        Dose:  1 capsule   Take 1 capsule by mouth daily.   Refills:  0       gentamicin 0.1 % cream   Commonly known as:  GARAMYCIN   Used for:  Peritoneal dialysis catheter dysfunction, subsequent encounter (H)   Order ID: [id number]        Apply topically daily   Quantity:  30 g   Refills:  11       LISINOPRIL PO   Order ID: [id number]        Dose:  20 mg   Take 20 mg by mouth   Refills:  0       senna 8.6 MG tablet   Commonly known as:  SENOKOT   Used for:  ESRD on peritoneal dialysis (H)   Order ID: [id number]        Dose:  1 tablet   Take 1 tablet by mouth daily   Quantity:  100 tablet   Refills:  0       sevelamer 800 MG tablet   Commonly known as:  RENAGEL   Used for:  Hyperphosphatemia   Order ID: [id number]        Dose:  800 mg   Take 1 tablet (800 mg) by mouth 3 times daily (with meals)   Quantity:  90 tablet   Refills:  11       sodium bicarbonate 650 MG tablet   Used for:  Acidosis   Order ID: [id number]        Dose:  1300 mg   Take 2 tablets (1,300 mg) by mouth 2 times daily   Quantity:  360 tablet   Refills:  3         DAVS: Troubleshooting Information     DAVS: Pharmacy calculations     ORD ID Pick-up type Pick-up location Reason    [id number] n/a  is PTA    [id number] n/a  is  PTA    [id number] n/a  is PTA    [id number] n/a  is PTA    [id number] n/a  is PTA    [id number] n/a  is PTA    [id number] n/a  is PTA    [id number] n/a  is PTA    [id number] n/a  is PTA    [id number] n/a  is PTA    [id number] Any pharmacy [2]  transmission method 53 is considered any-pharmacy (printed script)    [id number] Any pharmacy [2]  transmission method 53 is considered any-pharmacy (printed script)          DAVS: Transmission methods     Transmission method Mapping Reason for this mapping    Printed [1] Patient can get the medication from any pharmacy Hard-coded by Epic    Faxed [2] Sent to a specific pharmacy Hard-coded by Epic    E-Prescribed [3] Sent to a specific pharmacy Hard-coded by Epic    Mail Ordered [4] The medication will be delivered to the patient Hard-coded by Epic    Phoned In [5] Sent to a specific pharmacy Hard-coded by Epic    OTC [6] ? Hard-coded by Epic    Vended [7] Patient does not need to  the medication Hard-coded by [facility]    Internal Pharmacy [8] Sent to a specific pharmacy Hard-coded by Epic    Not Printed [51]  Specified in [facility]-6035    Printed at Discharge Pharmacy [52] Sent to a specific pharmacy Specified in [facility]    Printed at Department/Unit printer [53] Patient can get the medication from any pharmacy Specified in LSD-6032    Printed at [facility] [54] Sent to a specific pharmacy Specified in [facility]-6031    Printed at [facility] [55] Sent to a specific pharmacy Specified in [facility]-6031    Not Printed or Sent [56]  Specified in [facility]-6035    Printed at [facility] [57] Sent to a specific pharmacy Specified in [facility]31    Printed at [facility] [58] Sent to a specific pharmacy Specified in [facility]    Printed at [facility] [59] Sent to a specific pharmacy Specified in [facility]    Printed at [facility] [60] Sent to a specific pharmacy Specified in  LSD-6031    Printed at [facility] [61] Sent to a specific pharmacy Specified in LSD-6031    [facility] [62] None Custom value that is not mapped in System Definitions               Where to get your medicines      Some of these will need a paper prescription and others can be bought over the counter. Ask your nurse if you have questions. [pseudo-PHR: -2]     Bring a paper prescription for each of these medications [pseudo-PHR: -2]     acetaminophen 325 MG tablet [Order ID: [id number]]    oxyCODONE IR 5 MG tablet [Order ID: [id number]]                Protect others around you: Learn how to safely use, store and throw away your medicines at www.disposemymeds.org.        Information about OPIOIDS     PRESCRIPTION OPIOIDS: WHAT YOU NEED TO KNOW    Prescription opioids can be used to help relieve moderate to severe pain and are often prescribed following a surgery or injury, or for certain health conditions. These medications can be an important part of treatment but also come with serious risks. It is important to work with your health care provider to make sure you are getting the safest, most effective care.    WHAT ARE THE RISKS AND SIDE EFFECTS OF OPIOID USE?  Prescription opioids carry serious risks of addiction and overdose, especially with prolonged use. An opioid overdose, often marked by slowed breathing can cause sudden death. The use of prescription opioids can have a number of side effects as well, even when taken as directed:      Tolerance - meaning you might need to take more of a medication for the same pain relief    Physical dependence - meaning you have symptoms of withdrawal when a medication is stopped    Increased sensitivity to pain    Constipation    Nausea, vomiting, and dry mouth    Sleepiness and dizziness    Confusion    Depression    Low levels of testosterone that can result in lower sex drive, energy, and strength    Itching and sweating    RISKS ARE GREATER  WITH:    History of drug misuse, substance use disorder, or overdose    Mental health conditions (such as depression or anxiety)    Sleep apnea    Older age (65 years or older)    Pregnancy    Avoid alcohol while taking prescription opioids.   Also, unless specifically advised by your health care provider, medications to avoid include:    Benzodiazepines (such as Xanax or Valium)    Muscle relaxants (such as Soma or Flexeril)    Hypnotics (such as Ambien or Lunesta)    Other prescription opioids    KNOW YOUR OPTIONS:  Talk to your health care provider about ways to manage your pain that do not involve prescription opioids. Some of these options may actually work better and have fewer risks and side effects:    Pain relievers such as acetaminophen, ibuprofen, and naproxen    Some medications that are also used for depression or seizures    Physical therapy and exercise    Cognitive behavioral therapy, a psychological, goal-directed approach, in which patients learn how to modify physical, behavioral, and emotional triggers of pain and stress    IF YOU ARE PRESCRIBED OPIOIDS FOR PAIN:    Never take opioids in greater amounts or more often than prescribed    Follow up with your primary health care provider and work together to create a plan on how to manage your pain.    Talk about ways to help manage your pain that do not involve prescription opioids    Talk about all concerns and side effects    Help prevent misuse and abuse    Never sell or share prescription opioids    Never use another person's prescription opioids    Store prescription opioids in a secure place and out of reach of others (this may include visitors, children, friends, and family)    Visit www.cdc.gov/drugoverdose to learn about risks of opioid abuse and overdose    If you believe you may be struggling with addiction, tell your health care provider and ask for guidance or call [facility]'s National Helpline at 5-132-175-HELP    LEARN MORE /  "www.cdc.gov/drugoverdose/prescribing/guideline.html    Safely dispose of unused prescription opioids: Find your local drug take-back programs and more information about the importance of safe disposal at www.doseofreality.[place].gov          DAVS: Troubleshooting Information     AVS troubleshooting information  Refreshed 2018-03-07 18:56:45 CST    Data Value    Patient ID, internal format \"   [id number]\"    Patient FAUSTINA of contact being viewed [id number]    Patient CSN of contact being viewed [id number]    Mode OP_MEDS    Discharge IEV record [id number]    Contact type INP          DAVS: Context  IP    Data Value       Basic    LPG ID FV IP discharge avs active med list [[id number]]    pData ^Eprivate(62)    Calling context AVS_ACTIVITY    Filter LPP IP MED SEARCH DISCHARGE FILTER [63808]    Filter code $$FilterNonDischDisc^LMIUTIL1(ordId,eptID,eptDAT)       Inpatient    Is admission contact Yes [1]    Contact has been admitted Yes [1]    Contact has been discharged No [0]    Is on Leave of Absence No [0]    Admission IEV [id number]    Discharge IEV [id number]    FAUSTINA of patient's current admission [id number]       Emergency Department    IsED (contact) No [0]    IsCurED (patient) No [0]    PatientHasArrivedInED (only applies to ED contacts) No [0]       Hospital Outpatient    IsHOV No [0]    Appt status \"\"    ADT status Admitted    HOV as IP or AMB Treat as inpatient       Ambulatory    Is Ambulatory Contact No [0]    Is Encounter Closed No [0]       Surgery    Is surgery (type 51) contact? No [0]    Has linked surgery? Yes [1]    Is thin log? No [0]    Surgery FAUSTINA [id number]    Linked ORL Log [id number] [[id number]]    Linked ORC Case [id number] [[id number]]          DAVS: Snapshot  Live    Data Value    Showing a snapshot? No [0]    Snapshot strategy As of now [1]    Original snapshot strategy As of now [1]    IEV record [id number]    Most recent snapshot line for patient (in \"OP_MEDS\" mode) \"\"    Absolute last snapshot line (in \"OP_MEDS\" mode) \"\"          " DAVS: Generating the med list     ID Order Details    [id number] fish oil-omega-3 fatty acids (FISH OIL) 1000 MG capsule Found by the discharge search as a PTA order.    [id number] sevelamer (RENAGEL) 800 MG tablet Found by the discharge search as a PTA order.    [id number] sodium bicarbonate 650 MG tablet Found by the discharge search as a PTA order.    [id number] atorvastatin (LIPITOR) 10 MG tablet Found by the discharge search as a PTA order.    [id number] B Complex-C-Folic Acid (DIALYVITE 800) 0.8 MG TABS Found by the discharge search as a PTA order.    [id number] calcium acetate (PHOSLO) 667 MG CAPS capsule Found by the discharge search as a PTA order.    [id number] gentamicin (GARAMYCIN) 0.1 % cream Found by the discharge search as a PTA order.    [id number] senna (SENOKOT) 8.6 MG tablet Found by the discharge search as a PTA order.    [id number] LISINOPRIL PO Found by the discharge search as a PTA order.    [id number] DILTIAZEM HCL PO Found by the discharge search as a PTA order.    [id number] oxyCODONE IR (ROXICODONE) 5 MG tablet Found by the discharge search as a non-PTA order.    [id number] acetaminophen (TYLENOL) 325 MG tablet Found by the discharge search as a non-PTA order.          DAVS: Med list     Order Name In ORDERS node? In PTA_MEDS node?    [id number] fish oil-omega-3 fatty acids (FISH OIL) 1000 MG capsule YES YES    [id number] sevelamer (RENAGEL) 800 MG tablet YES YES    [id number] sodium bicarbonate 650 MG tablet YES YES    [id number] atorvastatin (LIPITOR) 10 MG tablet YES YES    [id number] B Complex-C-Folic Acid (DIALYVITE 800) 0.8 MG TABS YES YES    [id number] calcium acetate (PHOSLO) 667 MG CAPS capsule YES YES    [id number] gentamicin (GARAMYCIN) 0.1 % cream YES YES    [id number] senna (SENOKOT) 8.6 MG tablet YES YES    [id number] LISINOPRIL PO YES YES    [id number] DILTIAZEM HCL PO YES YES    [id number] oxyCODONE IR (ROXICODONE) 5 MG tablet YES NO    [id number] acetaminophen (TYLENOL) 325 MG tablet YES NO     (12 orders in ORDERS; 10 orders in PTA_MEDS)          DAVS: Pairing PTA orders with non-PTA orders (show by pair)     Pair # PTA Name Take-home Name    1 [id number] fish oil-omega-3 fatty acids (FISH OIL) 1000 MG capsule      2 [id number] sevelamer (RENAGEL) 800 MG tablet      3 [id number] sodium bicarbonate 650 MG tablet      4 [id number] atorvastatin (LIPITOR) 10 MG tablet      5 [id number] B Complex-C-Folic Acid (DIALYVITE 800) 0.8 MG TABS      6 [id number] calcium acetate (PHOSLO) 667 MG CAPS capsule      7 [id number] gentamicin (GARAMYCIN) 0.1 % cream      8 [id number] senna (SENOKOT) 8.6 MG tablet      9 [id number] LISINOPRIL PO      10 [id number] DILTIAZEM HCL PO      11   [id number] oxyCODONE IR (ROXICODONE) 5 MG tablet    12   [id number] acetaminophen (TYLENOL) 325 MG tablet          DAVS: Pairing PTA orders with non-PTA orders (show by order)     Order Name In pair? PTA pairs Take Home pairs    [id number] fish oil-omega-3 fatty acids (FISH OIL) 1000 MG capsule Yes 1     [id number] sevelamer (RENAGEL) 800 MG tablet Yes 2     [id number] sodium bicarbonate 650 MG tablet Yes 3     [id number] atorvastatin (LIPITOR) 10 MG tablet Yes 4     [id number] B Complex-C-Folic Acid (DIALYVITE 800) 0.8 MG TABS Yes 5     [id number] calcium acetate (PHOSLO) 667 MG CAPS capsule Yes 6     [id number] gentamicin (GARAMYCIN) 0.1 % cream Yes 7     [id number] senna (SENOKOT) 8.6 MG tablet Yes 8     [id number] LISINOPRIL PO Yes 9     [id number] DILTIAZEM HCL PO Yes 10     [id number] oxyCODONE IR (ROXICODONE) 5 MG tablet Yes  11    [id number] acetaminophen (TYLENOL) 325 MG tablet Yes  12    (12 orders)          DAVS: Calculating dispositions (first pass)     Pair PTA ID Action Details Taking? Disposition TH ID Action Details Taking? Disposition    1 [id number] Resume at Discharge [152] lpp-allow Yes [1] CONTINUE         2 [id number] Resume at Discharge [152] lpp-allow Yes [1] CONTINUE         3 [id number] Resume at Discharge [152] lpp-allow Yes [1] CONTINUE          4 [id number] Resume at Discharge [152] LT, lpp-allow Yes [1] CONTINUE         5 [id number] Resume at Discharge [152] lpp-allow Yes [1] CONTINUE         6 [id number] Resume at Discharge [152] lpp-allow Yes [1] CONTINUE         7 [id number] Resume at Discharge [152] lpp-allow Yes [1] CONTINUE         8 [id number] Resume at Discharge [152] lpp-allow Yes [1] CONTINUE         9 [id number] Resume at Discharge [152] lpp-allow Yes [1] CONTINUE         10 [id number] Resume at Discharge [152] lpp-allow Yes [1] CONTINUE         11      [id number] New at Discharge [155]  Yes [1] START    12      [id number] New at Discharge [155]  Yes [1] START    Key     Mnemonic Meaning    comb-from a STOP order that was combined with a START order    comb-to a START order that was combined with a STOP order    conc-enc from an encounter concurrent to this one    disc discontinued    dup-stop filtered out because it was a STOP order that duplicated another order    exp     fut-disc discontinued with a future discontinue date    lpp-allow the filter LPP decided not to hide this order    lpp-hide the filter LPP decided to hide this order    lpp-skip the order was not evaluated by the filter LPP because the order is part of a modification    LT medication is marked as long-term    no-rev-req no review required    quest unclear whether the order should be on the PTA list    unrev not reviewed for discharge                DAVS: Equivalency group adjustments     Group: Acetaminophen ([id number])     Order Old disposition New disposition Other Order    [id number] START --Unchanged--           Group: OxyCODONE HCl ([id number])     Order Old disposition New disposition Other Order    [id number] START --Unchanged--           Group: Sennosides ([id number])     Order Old disposition New disposition Other Order    [id number] CONTINUE --Unchanged--           Group: B Complex-C-Folic Acid ([id number])     Order Old disposition New disposition Other Order     [id number] CONTINUE --Unchanged--           Group: Calcium Acetate (Phos Binder) ([id number])     Order Old disposition New disposition Other Order    [id number] CONTINUE --Unchanged--           Group: Lisinopril ([id number])     Order Old disposition New disposition Other Order    [id number] CONTINUE --Unchanged--           Group: DilTIAZem HCl ([id number])     Order Old disposition New disposition Other Order    [id number] CONTINUE --Unchanged--           Group: Atorvastatin Calcium ([id number])     Order Old disposition New disposition Other Order    [id number] CONTINUE --Unchanged--           Group: Omega-3 Fatty Acids ([id number])     Order Old disposition New disposition Other Order    [id number] CONTINUE --Unchanged--           Group: Gentamicin Sulfate ([id number])     Order Old disposition New disposition Other Order    [id number] CONTINUE --Unchanged--           Group: Sevelamer HCl ([id number])     Order Old disposition New disposition Other Order    [id number] CONTINUE --Unchanged--           Group: Sodium Bicarbonate ([id number])     Order Old disposition New disposition Other Order    [id number] CONTINUE --Unchanged--                 DAVS: Calculating dispositions (second pass)     Pair PTA ID Action Details Taking? Disposition TH ID Action Details Taking? Disposition    1 [id number] Resume at Discharge [152] lpp-allow Yes [1] CONTINUE         2 [id number] Resume at Discharge [152] lpp-allow Yes [1] CONTINUE         3 [id number] Resume at Discharge [152] lpp-allow Yes [1] CONTINUE         4 [id number] Resume at Discharge [152] LT, lpp-allow Yes [1] CONTINUE         5 [id number] Resume at Discharge [152] lpp-allow Yes [1] CONTINUE         6 [id number] Resume at Discharge [152] lpp-allow Yes [1] CONTINUE         7 [id number] Resume at Discharge [152] lpp-allow Yes [1] CONTINUE         8 [id number] Resume at Discharge [152] lpp-allow Yes [1] CONTINUE         9 [id number] Resume at Discharge [152] lpp-allow Yes [1] CONTINUE          10 [id number] Resume at Discharge [152] lpp-allow Yes [1] CONTINUE         11      [id number] New at Discharge [155]  Yes [1] START    12      [id number] New at Discharge [155]  Yes [1] START    Key     Mnemonic Meaning    comb-from a STOP order that was combined with a START order    comb-to a START order that was combined with a STOP order    conc-enc from an encounter concurrent to this one    disc discontinued    dup-stop filtered out because it was a STOP order that duplicated another order    exp     fut-disc discontinued with a future discontinue date    lpp-allow the filter LPP decided not to hide this order    lpp-hide the filter LPP decided to hide this order    lpp-skip the order was not evaluated by the filter LPP because the order is part of a modification    LT medication is marked as long-term    no-rev-req no review required    quest unclear whether the order should be on the PTA list    unrev not reviewed for discharge                     Medication List: This is a list of all your medications and when to take them. Check marks below indicate your daily home schedule. Keep this list as a reference.      Medications           Morning Afternoon Evening Bedtime As Needed    acetaminophen 325 MG tablet   Commonly known as:  TYLENOL   Take 2 tablets (650 mg) by mouth every 4 hours as needed for mild pain   Last time this was given:  975 mg on 3/7/2018  5:52 PM   Order ID: [id number]                                atorvastatin 10 MG tablet   Commonly known as:  LIPITOR   Take 1 tablet (10 mg) by mouth daily   Order ID: [id number]                                calcium acetate 667 MG Caps capsule   Commonly known as:  PHOSLO   Take 3 capsules (2,001 mg) by mouth 3 times daily (with meals)   Order ID: [id number]                                DIALYVITE 800 0.8 MG Tabs   Take 1 tablet by mouth daily   Order ID: [id number]                                DILTIAZEM HCL PO   Take 180 mg by mouth daily   Order  ID: [id number]                                fish oil-omega-3 fatty acids 1000 MG capsule   Take 1 capsule by mouth daily.   Order ID: [id number]                                gentamicin 0.1 % cream   Commonly known as:  GARAMYCIN   Apply topically daily   Order ID: [id number]                                LISINOPRIL PO   Take 20 mg by mouth   Order ID: [id number]                                oxyCODONE IR 5 MG tablet   Commonly known as:  ROXICODONE   Take 1-2 tablets (5-10 mg) by mouth every 6 hours as needed for pain maximum 6 tablet(s) per day   Order ID: [id number]                                senna 8.6 MG tablet   Commonly known as:  SENOKOT   Take 1 tablet by mouth daily   Order ID: [id number]                                sevelamer 800 MG tablet   Commonly known as:  RENAGEL   Take 1 tablet (800 mg) by mouth 3 times daily (with meals)   Order ID: [id number]                                sodium bicarbonate 650 MG tablet   Take 2 tablets (1,300 mg) by mouth 2 times daily   Order ID: [id number]                                          More Information        Steven Catheter Care    A Steven catheter is a rubber tube that is placed through the urethra (opening where urine comes out) and into the bladder. This helps drain urine from the bladder. There is a small balloon on the end of the tube that is inflated after insertion. This keeps the catheter from sliding out of the bladder.  A Steven catheter is used to treat urinary retention (unable to pass urine). It is also used when there is incontinence (loss of bladder control).  Home care    Finish taking any prescribed antibiotic even if you are feeling better before then.    It is important to keep bacteria from getting into the collection bag. Do not disconnect the catheter from the collection bag.    Use a leg band to secure the drainage tube, so it does not pull on the catheter. Drain the collection bag when it becomes full using the drain spout at the bottom of  the bag.    Do not try to pull or remove your catheter. This will injure your urethra. It must be removed by your healthcare provider or nurse.  Follow-up care  Follow up with your healthcare provider as advised for repeat urine testing and catheter removal or replacement.  When to seek medical advice  Call your healthcare provider right away if any of these occur:    Fever of 100.4 F (38 C) or higher, or as directed by your healthcare provider    Bladder pain or fullness    Abdominal swelling, nausea or vomiting, or back pain    Blood or urine leakage around the catheter    Bloody urine coming from the catheter (if a new symptom)    Catheter falls out    Catheter stops draining for 6 hours    Weakness, dizziness, or fainting  Date Last Reviewed: 10/1/2016    [phone] [employer]. [address] [place] [address], [place] [address]. All rights reserved. This information is not intended as a substitute for professional medical care. Always follow your healthcare professional's instructions.

## 2018-03-07 NOTE — ANESTHESIA CARE TRANSFER NOTE
Patient: Cesar Villalobos    Procedure(s):  Laparoscopic Removal and Re-insertion Of Peritoneal Dialysis Catheter  - Wound Class: I-Clean    Diagnosis: Dialysis Complications   Diagnosis Additional Information: No value filed.    Anesthesia Type:   General, ETT     Note:  Airway :Face Mask  Patient transferred to:PACU  Comments: To PACU on supplemental O2 with + air exchange, placed on monitor, waiting for PACU RN.     Report given to RN, questions answered, VSS, patient alert and comfortable.Handoff Report: Identifed the Patient, Identified the Reponsible Provider, Reviewed the pertinent medical history, Discussed the surgical course, Reviewed Intra-OP anesthesia mangement and issues during anesthesia, Set expectations for post-procedure period and Allowed opportunity for questions and acknowledgement of understanding      Vitals: (Last set prior to Anesthesia Care Transfer)    CRNA VITALS  3/7/2018 1641 - 3/7/2018 1716      3/7/2018             Pulse: 83    SpO2: 100 %    Resp Rate (observed): (!)  6                Electronically Signed By: RUPA Bobby CRNA  March 7, 2018  5:16 PM

## 2018-03-07 NOTE — OP NOTE
Transplant Surgery  Operative Note    PREOP DIAGNOSIS: Status post kidney transplant for end stage renal failure  POSTOP DIAGNOSIS: Same  PROCEDURE: Removal Peritoneal Dialysis Catheter and reinsertion of PD catheter  FACULTY: Caden Tay M.D., Ph.D., Rodyd Merida M.D.  ASSISTANT: Aleksandra Vieira, fellow.  ANESTHESIA: GA,   EBL: 30 ml.  SPECIMEN: none   FINDINGS: Abnormal: The previous PD catheter was entangled by a redundant sigmoid colon loop and had fibrin clots within.    COMPLICATIONS: None.    INDICATION: The patient has a PD catheter that is not functioning. The indications, benefits, and risks of catheter removal and reinsertion of new PD catheter was discussed, questions answered, and informed consent was provided.     PROCEDURE:   The patient was placed supine on the operating table.    After induction of general anesthesia, the abdomen was prepped and draped in the usual fashion.  A granados catheter and orogastric tube were placed to decompress the bladder and stomach.  Granados placement was difficult.  We eventually called urology who placed a catheter cystoscopically.    A time-out was performed to ensure the correct patient and procedure.  Perioperative antibiotics were given. The peritoneal cavity was insufflated through the previous PD catheter. A 5mm port was next inserted at Palmar's point.  The laparoscopic survey previous PD catheter was entangled in a loop of sigmoid colon and had fibrin clots within. An additional 5mm port was inserted in the right lumbar area.   We delivered the catheter tip through the port and declogged it.  When returned to the abdomen we were concerned that the redundant colon would impair function.  We decided that it needed to be deeper in the pelvis than the current position allowed.  We decided to place a new catheter.    An 8mm incision was made in the right paramedian space based on the stencil's map. Over the Veress needle the STEP expander sheath was loaded and  under direct visualization, the Veress needle was walked caudad along the right posterior rectus sheath.  Then we entered the peritoneal cavity approximately 4 cm caudad to the skin incision.  We removed the Veress needle and dilated the sheath with the port, loaded the catheter on a stylet and inserted it through the port down into the true pelvis, taking care to maintain correct orientation of the catheter.  At this point, we removed the stylet. Care was taken to make sure that the distal cuff remained outside the peritoneal space.  The abdomen was desufflated.  We used the Jason tunneling device to externalize the catheter out the previously marked site. We connected the end of the catheter to a liter of saline and allowed it to run in.  It ran in easily.  We placed the bag on the floor and by gravity drainage the fluid drained easily for a near complete evacuation of non-bloody saline.      Net we proceeded to do PD catheter removal. The skin was incised sharply over the fascial cuff site and blunt dissection released the subcutaneous tissues from the cuffs.  The catheter was then removed.  The catheter/cuffs were inspected and appeared intact. The fascia over the deep cuff exit site was reapproximated with 0 vicryl.  Hemostasis was obtained. The wounds were irrigated and closed in 2 layers with absorbable sutures and dressed with dermabond.      All the ports were removed.  The skin incisions were closed with Monocryl and Dermabond and the sterile extension set and minicap was placed on the end of the catheter.  A sterile occlusive dressing was applied over the catheter exit site.  All needle and sponge counts were correct x 2.  The patient was awakened from anesthesia and transported to the recovery room, having tolerated the procedure well.  There were no apparent complications.   Faculty was present for the critical portions of the procedure.    ATTESTATION:    I was present during the key portions of the  procedure, and I was immediately available for the entire procedure between opening and closing.    Caden Tay MD, PhD  Assistant Professor of Surgery

## 2018-03-07 NOTE — PROCEDURES
Urology Procedure Note    PATIENT NAME: Cesar Villalobos  MEDICAL RECORD NUMBER: [id number]  SURGEON: Stalin Uriostegui - consulted prior to procedure  RESIDENTt: Ritesh Santos - present for procedure  PREOPERATIVE DIAGNOSIS: Difficult granados placement  POSTOPERATIVE DIAGNOSIS: False passage of bulbar urethra  PROCEDURE PERFORMED: Cystoscopy, placement of catheter over wire  ANESTHESIA: General  COMPLICATIONS: None.   OPERATIVE FINDINGS: Successful Replacement of Stent(s)  EBL (mL): minimal  INDICATIONS FOR PROCEDURE: Mr Villalobos is a 52 year old man with a history of postoperative urinary retention who presented today for peritoneal catheter exchange.  OR staff encountered difficulty placing a preoperative granados catheter.  Urology was consulted for further assistance.  Initial attempts to place a 20F catheter at bedside were unsuccessful.  Further attempts with a 12F catheter were similarly unsuccessful.  In light of these difficulties a decision was made to proceed with cystoscopy and catheter placement over a wire.    DETAILS OF PROCEDURE: The flexible scope was introduced into a well lubricated urethra.  The pendulous urethra was unremarkable.  There appeared to be an segment of mild trauma and narrowing of the proximal bulbar urethra.  We were able to navigate this segment with the scope.  The prostate was approximately 2 cm with no significant lateral lobe enlargement.  The bladder was unremarkable.  A sensor wire was advanced into the bladder.  The scope was removed.  A 16F [place] tip catheter was advanced over the wire into the bladder with 10 cc of sterile water introduced into the balloon.  Urine output was noted.      POSTOPERATIVE PLAN:  -Catheter to remain in place for 3-5 days with trial of void to performed in clinic

## 2018-03-08 NOTE — ADDENDUM NOTE
Addendum  created 03/07/18 2025 by Vitor Medina MD    Anesthesia Event edited, Procedure Event Log accessed

## 2018-03-08 NOTE — DISCHARGE INSTRUCTIONS
[facility]  Same-Day Surgery   Adult Discharge Orders & Instructions     For 24 hours after surgery    1. Get plenty of rest.  A responsible adult must stay with you for at least 24 hours after you leave the hospital.   2. Do not drive or use heavy equipment.  If you have weakness or tingling, don't drive or use heavy equipment until this feeling goes away.  3. Do not drink alcohol.  4. Avoid strenuous or risky activities.  Ask for help when climbing stairs.   5. You may feel lightheaded.  IF so, sit for a few minutes before standing.  Have someone help you get up.   6. If you have nausea (feel sick to your stomach): Drink only clear liquids such as apple juice, ginger ale, broth or 7-Up.  Rest may also help.  Be sure to drink enough fluids.  Move to a regular diet as you feel able.  7. You may have a slight fever. Call the doctor if your fever is over 100 F (37.7 C) (taken under the tongue) or lasts longer than 24 hours.  8. You may have a dry mouth, a sore throat, muscle aches or trouble sleeping.  These should go away after 24 hours.  9. Do not make important or legal decisions.   Call your doctor for any of the followin.  Signs of infection (fever, growing tenderness at the surgery site, a large amount of drainage or bleeding, severe pain, foul-smelling drainage, redness, swelling).    2. It has been over 8 to 10 hours since surgery and you are still not able to urinate (pass water).    3.  Headache for over 24 hours.      To contact a doctor, call Dr Tay's office at [phone] or:        [phone] and ask for the resident on call for General Surgery (answered 24 hours a day)      Emergency Department:    [facility]: [phone]       (TTY for hearing impaired: [phone])               Tips for taking pain medications  To get the best pain relief possible , remember these points:      Take pain medications as directed, before pain becomes  severe      Pain medication can upset your stomach: taking it with food may help      Constipation is a common side effect of pain medication. Drink plenty of  Fluids      Eat foods high in fiber. Take a stool softener  if recommended by your doctor or  Pharmacist.        Do not drink alcohol, drive or operate machinery while taking pain medications.      Ask about other ways to control pain, such as with heat, ice or relaxation.

## 2018-03-08 NOTE — OR NURSING
Patient's wife wanted a note written in his chart stating he was really sleeping after surgery this time.  She would prefer he get as little medication as possible to do the procedure comfortably next time.

## 2018-03-12 ENCOUNTER — OFFICE VISIT (OUTPATIENT)
Dept: TRANSPLANT | Facility: CLINIC | Age: 53
End: 2018-03-12
Attending: TRANSPLANT SURGERY
Payer: COMMERCIAL

## 2018-03-12 VITALS
BODY MASS INDEX: 27.81 KG/M2 | WEIGHT: 216.7 LBS | SYSTOLIC BLOOD PRESSURE: 148 MMHG | HEART RATE: 85 BPM | HEIGHT: 74 IN | OXYGEN SATURATION: 96 % | DIASTOLIC BLOOD PRESSURE: 84 MMHG | TEMPERATURE: 98.1 F | RESPIRATION RATE: 16 BRPM

## 2018-03-12 DIAGNOSIS — R33.9 URINARY RETENTION: Primary | ICD-10-CM

## 2018-03-12 PROCEDURE — G0463 HOSPITAL OUTPT CLINIC VISIT: HCPCS | Mod: ZF

## 2018-03-12 NOTE — PROGRESS NOTES
"  Transplant Surgery      Reason For Visit: granados removal    History of Present Illness:  Recently had PD catheter reposition.  Difficult granados placement -- urology placed cystoscopically.  Planned for follow up today for void trial        Exam:   /84  Pulse 85  Temp 98.1  F (36.7  C) (Oral)  Resp 16  Ht 1.88 m (6' 2\")  Wt 98.3 kg (216 lb 11.2 oz)  SpO2 96%  BMI 27.82 kg/m2  Incisions clean, dry, and intact     Impression:  Try for granados removal.  If unable to void will need assistance for replacement of catheter.    Plan: Void trial today    Caden Tay MD, PhD  Transplant Surgery      "

## 2018-03-12 NOTE — NURSING NOTE
"Chief Complaint   Patient presents with     Allied Health Visit     follow up       Initial /84  Pulse 85  Temp 98.1  F (36.7  C) (Oral)  Resp 16  Ht 1.88 m (6' 2\")  Wt 98.3 kg (216 lb 11.2 oz)  SpO2 96%  BMI 27.82 kg/m2 Estimated body mass index is 27.82 kg/(m^2) as calculated from the following:    Height as of this encounter: 1.88 m (6' 2\").    Weight as of this encounter: 98.3 kg (216 lb 11.2 oz).  Medication Reconciliation: complete    "

## 2018-03-12 NOTE — LETTER
"3/12/2018       RE: Cesar Villalobos  [address] [place] [address]     Dear Colleague,    Thank you for referring your patient, Cesar Villalobos, to the [facility] SOLID ORGAN TRANSPLANT at [facility]. Please see a copy of my visit note below.      Reason For Visit: granados removal    History of Present Illness:  Recently had PD catheter reposition.  Difficult granados placement -- urology placed cystoscopically.  Planned for follow up today for void trial        Exam:   /84  Pulse 85  Temp 98.1  F (36.7  C) (Oral)  Resp 16  Ht 1.88 m (6' 2\")  Wt 98.3 kg (216 lb 11.2 oz)  SpO2 96%  BMI 27.82 kg/m2  Incisions clean, dry, and intact     Impression:  Try for granados removal.  If unable to void will need assistance for replacement of catheter.    Plan: Void trial today    Caden Tay MD, PhD  Transplant Surgery      "

## 2018-03-22 DIAGNOSIS — N25.81 SECONDARY RENAL HYPERPARATHYROIDISM (H): Primary | ICD-10-CM

## 2018-03-22 RX ORDER — SEVELAMER CARBONATE 800 MG/1
3200 TABLET, FILM COATED ORAL
Qty: 1000 TABLET | Refills: 3 | Status: SHIPPED | OUTPATIENT
Start: 2018-03-22 | End: 2018-11-12

## 2018-03-26 DIAGNOSIS — N18.6 ESRD (END STAGE RENAL DISEASE) (H): ICD-10-CM

## 2018-03-26 DIAGNOSIS — Z94.9 ORGAN TRANSPLANT: Primary | ICD-10-CM

## 2018-04-03 ENCOUNTER — RESULTS ONLY (OUTPATIENT)
Dept: OTHER | Facility: CLINIC | Age: 53
End: 2018-04-03

## 2018-04-03 DIAGNOSIS — Z94.9 ORGAN TRANSPLANT: ICD-10-CM

## 2018-04-03 DIAGNOSIS — N18.6 ESRD (END STAGE RENAL DISEASE) (H): ICD-10-CM

## 2018-04-05 ENCOUNTER — OFFICE VISIT (OUTPATIENT)
Dept: NEPHROLOGY | Facility: CLINIC | Age: 53
End: 2018-04-05
Attending: NURSE PRACTITIONER
Payer: MEDICARE

## 2018-04-05 VITALS
BODY MASS INDEX: 28.36 KG/M2 | TEMPERATURE: 98 F | WEIGHT: 221 LBS | HEIGHT: 74 IN | HEART RATE: 93 BPM | SYSTOLIC BLOOD PRESSURE: 136 MMHG | OXYGEN SATURATION: 96 % | DIASTOLIC BLOOD PRESSURE: 89 MMHG

## 2018-04-05 DIAGNOSIS — Z99.2 END STAGE RENAL DISEASE ON DIALYSIS (H): ICD-10-CM

## 2018-04-05 DIAGNOSIS — N18.6 END STAGE RENAL DISEASE ON DIALYSIS (H): ICD-10-CM

## 2018-04-05 DIAGNOSIS — Z76.82 ORGAN TRANSPLANT CANDIDATE: Primary | ICD-10-CM

## 2018-04-05 PROCEDURE — G0463 HOSPITAL OUTPT CLINIC VISIT: HCPCS | Mod: ZF

## 2018-04-05 ASSESSMENT — PAIN SCALES - GENERAL: PAINLEVEL: NO PAIN (0)

## 2018-04-05 NOTE — MR AVS SNAPSHOT
"              After Visit Summary   4/5/2018    Cesar Villalobos    MRN: [id number]           Patient Information     Date Of Birth          1965        Visit Information        Provider Department      4/5/2018 2:00 PM Jermaine Perez APRN CNP [facility] Nephrology        Today's Diagnoses     Organ transplant candidate    -  1    End stage renal disease on dialysis (H)           Follow-ups after your visit        Who to contact     If you have questions or need follow up information about today's clinic visit or your schedule please contact [facility] NEPHROLOGY directly at [phone].  Normal or non-critical lab and imaging results will be communicated to you by "[employer]hart, letter or phone within 4 business days after the clinic has received the results. If you do not hear from us within 7 days, please contact the clinic through [employer] or phone. If you have a critical or abnormal lab result, we will notify you by phone as soon as possible.  Submit refill requests through [employer] or call your pharmacy and they will forward the refill request to us. Please allow 3 business days for your refill to be completed.          Additional Information About Your Visit        MyChart Information     [employer] gives you secure access to your electronic health record. If you see a primary care provider, you can also send messages to your care team and make appointments. If you have questions, please call your primary care clinic.  If you do not have a primary care provider, please call [phone] and they will assist you.        Care EveryWhere ID     This is your Care EveryWhere ID. This could be used by other organizations to access your [facility] medical records  [id number]        Your Vitals Were     Pulse Temperature Height Pulse Oximetry BMI (Body Mass Index)       93 98  F (36.7  C) (Oral) 1.88 m (6' 2\") 96% 28.37 kg/m2        Blood Pressure from Last 3 Encounters:   04/05/18 136/89   03/12/18 148/84   03/07/18 " 116/89    Weight from Last 3 Encounters:   04/05/18 100.2 kg (221 lb)   03/12/18 98.3 kg (216 lb 11.2 oz)   03/07/18 97.4 kg (214 lb 11.7 oz)              Today, you had the following     No orders found for display         Today's Medication Changes          These changes are accurate as of 4/5/18 11:59 PM.  If you have any questions, ask your nurse or doctor.               These medicines have changed or have updated prescriptions.        Dose/Directions    senna 8.6 MG tablet   Commonly known as:  SENOKOT   This may have changed:    - how much to take  - when to take this  - reasons to take this   Used for:  ESRD on peritoneal dialysis (H)        Dose:  1 tablet   Take 1 tablet by mouth daily   Quantity:  100 tablet   Refills:  0                Primary Care Provider Office Phone # Fax #    Sallie Olsen [phone]       [address] [place] [address]        Equal Access to Services     COLLEEN DEWEY : Hadii carolynn marteo Soonesimo, waaxda luqjaqui, qaybta kaalmada ademimi, chandrakant coleman . So [facility] [phone].    ATENCIÓN: Si habla español, tiene a coe disposición servicios gratuitos de asistencia lingüística. Llame al [phone].    We comply with applicable federal civil rights laws and [place] laws. We do not discriminate on the basis of race, color, national origin, age, disability, sex, sexual orientation, or gender identity.            Thank you!     Thank you for choosing [facility] NEPHROLOGY  for your care. Our goal is always to provide you with excellent care. Hearing back from our patients is one way we can continue to improve our services. Please take a few minutes to complete the written survey that you may receive in the mail after your visit with us. Thank you!             Your Updated Medication List - Protect others around you: Learn how to safely use, store and throw away your medicines at www.disposemymeds.org.          This list is  accurate as of 4/5/18 11:59 PM.  Always use your most recent med list.                   Brand Name Dispense Instructions for use Diagnosis    atorvastatin 10 MG tablet    LIPITOR    90 tablet    Take 1 tablet (10 mg) by mouth daily    Mixed hyperlipidemia       calcium acetate 667 MG Caps capsule    PHOSLO    270 capsule    Take 3 capsules (2,001 mg) by mouth 3 times daily (with meals)    Hyperphosphatemia       DIALYVITE 800 0.8 MG Tabs     90 tablet    Take 1 tablet by mouth daily    ESRD (end stage renal disease) on dialysis (H)       DILTIAZEM HCL PO      Take 180 mg by mouth daily        fish oil-omega-3 fatty acids 1000 MG capsule      Take 1 capsule by mouth daily.        gentamicin 0.1 % cream    GARAMYCIN    30 g    Apply topically daily    Peritoneal dialysis catheter dysfunction, subsequent encounter (H)       LISINOPRIL PO      Take 20 mg by mouth        senna 8.6 MG tablet    SENOKOT    100 tablet    Take 1 tablet by mouth daily    ESRD on peritoneal dialysis (H)       sevelamer 800 MG tablet    RENVELA    1000 tablet    Take 4 tablets (3,200 mg) by mouth 3 times daily (with meals)    Secondary renal hyperparathyroidism (H)       sodium bicarbonate 650 MG tablet     360 tablet    Take 2 tablets (1,300 mg) by mouth 2 times daily    Acidosis

## 2018-04-05 NOTE — LETTER
2018       RE: Cesar Villalobos  [address] [place] [address]     Dear Colleague,    Thank you for referring your patient, Cesar Villalobos, to the [facility] NEPHROLOGY at [facility]. Please see a copy of my visit note below.    Assessment and Plan:  1. Kidney transplant wait list evaluation - Mr. Villalobos is a excellent candidate overall. He should be active on the wait list.  2. ESKD from unclear etiology - on peritoneal dialysis since 2017 complicated by peritonitis in 2017 and PD catheter coiling with appendicitis s/p appendectomy (2018). On HD from 2018-3/2018, now back on PD. No new potential donors. Would benefit from a kidney transplant.    3. Cardiac risk - no history of cardiac disease or events. Last stress test negative in . Last ECHO in 2017 showed an EF of 55-60%. No exertional symptoms.   4. Minimally complex renal cyst measuring 6.7 cm in the inferior pole of the left kidney (Bosniak 2) - noted on imaging from 2018. Will need follow up 6 month renal US.   5. Bilateral pulmonary nodules measuring up to 4 mm - noted on CT chest form 2018. Will need 12 month follow up CT chest. No smoking history.    6. Health maintenance - colonoscopy done in 3/2017. PSA 1.52. Dental UTD.      Discussed the risks and benefits of a transplant, including the risk of surgery and immunosuppression medications.    KDPI: We discussed approximate remaining wait time and how that is influenced by issues such as blood type and sensitization (PRA) and access to a living donor. I contrasted potential waiting time for living vs  donor kidneys from  normal (0-85%) or higher (%) kidney donor profile index (KDPI) donors and their associated outcomes. I would recommend Mr. Villalobos to consider kidneys from high KDPI donors. The reason for this decision is best summarized as: decreased dialysis related morbidity/mortality, accepting lower kidney graft  survival rates.    Patient s overall re-evaluation may require further discussion in the Transplant Program s multidisciplinary selection committee for a final recommendation on the patient s suitability for transplant.     Reason for Visit:  Cesar Villalobos is a 52 year old male with ESKD from unclear etiology, who presents for Kidney transplant wait list evaluation.     Last Evaluation Clinic Visit Date: 7/2014 ([place])        Wait List Date: 10/1/2014  Current phase/status: Waitlist: Active as of 2/5/2018  Transplant coordinator: Marissa Escobar Transplant Office phone number [phone]     Previous Medical Issues:  N/A     HPI: Mr. Villalobos is a 52-year-old that presents for wait list evaluation with PMH of ESKD secondary to unclear cause, on PD since 6/2017, UTI with gross hematuria in his late 30s. He has been wait listed since 10/2014.              Interim events/issues/events - since Mr. Villalobos was last seen he had a few hospitalizations for peritonitis and PD catheter issues which are detailed below. He did not require any blood transfusions. 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 Overall, he is feeling fairly well, but does complain of some right-sided flank pain after dialysis runs. Denies fevers, chills or sweats.     Hospitalizations    9/2017 - peritonitis and treated.     10/2017 - treated in ED for suspected peritonitis. No cultures collected.     1/2018 - PD cath repositioned d/t coiling around appendix. Complicated by appendicitis s/p laparoscopy appendectomy.     3/2018 - PD removal from left side and reinsertion on right side of PD cath. With new catheter he reports pain with the machine, so he is doing his PD manually now. Still complains of right flank pain. 4 exchanges per day with some mild pain at the end.     Imaging     1/2018 - CT chest showed bilateral pulmonary nodules measuring up to 4 mm and indeterminate liver hypodensities. Follow up abd/pelvis  CT showed hepatic mass lesions being hemangiomas. No suspicious hepatic lesions.     2/2018 - CT abd/pelvis showed mildly atrophic bilateral native kidneys with scattered simple cysts and a dominant minimally complex cyst measuring 6.7 cm in the inferior pole of the left kidney, Bosniak 2.      Cardiac     8/2013 - normal stress test.     9/2017 - ECHO showed an EF of 55-60%.     He has no history of cardiac disease or events. He is not getting any regular exercise due to fatigue, however, before he started dialysis he use to enjoy golfing and biking. He reports he can walk a few miles now and needs to stop due to fatigue, but denies chest pain, shortness of breath or claudications symptoms. Last summer he was riding his bike to work.     Dialysis  In the setting of PD catheter problem, he was on HD from 1/2018-3/2018. His chest cath is still in place, while his new PD catheter on the left is working well without any signs of site infection or peritonitis. He does complain of some right flank pain after his runs that was worse with his machine, so now he is doing PD manually now. He is making good urine output and denies dysuria, hematuria or trouble emptying his bladder or starting his stream. His complains of fatigue, but denies nausea, vomiting, and poor appetite. He has not been taking his BPs medications for the past few months due to hypotension during his infections. SBPs have more recently ranged 120-130s. He denies light-headedness, dizziness or syncope.                  Kidney Disease Hx       Kidney Disease Dx: unclear cause        Biopsy Proven: No        On Dialysis: Yes, Date initiated: 6/2017 and Dialysis Type: PD;       Primary Nephrologist: Dr. Muñiz           Uremic Symptoms: Fatigue: Yes; Nausea: Yes;          Potential Donor(s): No          Cardiac history:       Last cardiac risk assessment: N/A       Last stress test/coronary angiogram: 2013       Exertional symptoms: none        Recent  cardiac events: none      Pertinent issues:   Blood transfusion: No  Jehovah s Witness: No  Pregnancies: No  Previous transplant: No  Urine output: Yes  Bladder dysfunction: No  Claudication: No  Previous amputation: No  Cancer: No  Recurrent infection: No  Chronic anticoagulation: No      ROS:  A comprehensive review of systems was obtained and negative, except as noted in the HPI or PMH.     PMH:  Medical records were obtained and reviewed.  Past Medical History:   Diagnosis Date     Anemia in chronic kidney disease      Dyslipidemia      End stage renal disease on dialysis (H)      Hypertension         PSH:  Past Surgical History:   Procedure Laterality Date     HERNIA REPAIR, INGUINAL RT/LT Left     as child     LAPAROSCOPIC APPENDECTOMY N/A 1/9/2018    Procedure: LAPAROSCOPIC APPENDECTOMY;  Laparoscopic Appendectomy ;  Surgeon: Caden Tay MD;  Location: [facility] OR     LAPAROSCOPIC INSERTION CATHETER PERITONEAL DIALYSIS N/A 6/6/2017    Procedure: LAPAROSCOPIC INSERTION CATHETER PERITONEAL DIALYSIS;  Laparoscopic Peritoneal Dialysis Catheter Placement. ;  Surgeon: Caden Tay MD;  Location: [facility] OR     LAPAROSCOPIC INSERTION CATHETER PERITONEAL DIALYSIS N/A 1/5/2018    Procedure: LAPAROSCOPIC INSERTION CATHETER PERITONEAL DIALYSIS;  Laparoscopic Repositioning of Peritoneal Dialysis Catheter.;  Surgeon: Caden Tay MD;  Location: [facility] OR     LAPAROSCOPIC INSERTION CATHETER PERITONEAL DIALYSIS N/A 1/9/2018    Procedure: LAPAROSCOPIC INSERTION CATHETER PERITONEAL DIALYSIS;;  Surgeon: Caden Tay MD;  Location: [facility] OR     LAPAROSCOPIC INSERTION CATHETER PERITONEAL DIALYSIS N/A 3/7/2018    Procedure: LAPAROSCOPIC INSERTION CATHETER PERITONEAL DIALYSIS;  Laparoscopic Removal and Re-insertion Of Peritoneal Dialysis Catheter ;  Surgeon: Caden Tay MD;  Location: [facility] OR        Personal Hx:  Social History     Social History     Marital status: Single     Spouse name: mike Leone  children: 1     Years of education: N/A     Occupational History      [employer]     Social History Main Topics     Smoking status: Never Smoker     Smokeless tobacco: Never Used     Alcohol use No     Drug use: No     Sexual activity: Yes     Other Topics Concern     Exercise No     Social History Narrative        Allergies:  Allergies   Allergen Reactions     Nsaids      Swelling and Hives          Medications:  Prior to Admission medications    Medication Sig Start Date End Date Taking? Authorizing Provider   sevelamer (RENVELA) 800 MG tablet Take 4 tablets (3,200 mg) by mouth 3 times daily (with meals) 3/22/18   Merline Muñiz MD   LISINOPRIL PO Take 20 mg by mouth    Reported, Patient   DILTIAZEM HCL PO Take 180 mg by mouth daily    Reported, Patient   oxyCODONE IR (ROXICODONE) 5 MG tablet Take 1-2 tablets (5-10 mg) by mouth every 6 hours as needed for pain maximum 6 tablet(s) per day 3/7/18   Aleksandra Vieira MD   acetaminophen (TYLENOL) 325 MG tablet Take 2 tablets (650 mg) by mouth every 4 hours as needed for mild pain 3/7/18   Aleksandra Vieira MD   senna (SENOKOT) 8.6 MG tablet Take 1 tablet by mouth daily  Patient taking differently: Take 2 tablets by mouth 2 times daily as needed  1/5/18   Antonio Edge MD   gentamicin (GARAMYCIN) 0.1 % cream Apply topically daily 12/18/17   Merline Muñiz MD   calcium acetate (PHOSLO) 667 MG CAPS capsule Take 3 capsules (2,001 mg) by mouth 3 times daily (with meals) 10/23/17   Merline Muñiz MD   sodium bicarbonate 650 MG tablet Take 2 tablets (1,300 mg) by mouth 2 times daily 9/22/17   Merline Muñiz MD   atorvastatin (LIPITOR) 10 MG tablet Take 1 tablet (10 mg) by mouth daily 9/22/17   Merline Muñiz MD   B Complex-C-Folic Acid (DIALYVITE 800) 0.8 MG TABS Take 1 tablet by mouth daily 9/22/17   Merline Muñiz MD   fish oil-omega-3 fatty acids (FISH OIL) 1000 MG capsule Take 1 capsule by mouth daily.    Reported, Patient       "  Vitals:  /89  Pulse 93  Temp 98  F (36.7  C) (Oral)  Ht 1.88 m (6' 2\")  Wt 100.2 kg (221 lb)  SpO2 96%  BMI 28.37 kg/m2     Exam:  GENERAL APPEARANCE: alert and no distress  HENT: mouth without ulcers or lesions. Good dentition.   LYMPHATICS: no cervical or supraclavicular nodes  RESP: lungs clear to auscultation - no rales, rhonchi or wheezes  CV: regular rhythm, normal rate, no rub, no murmur  FEMORAL PULSES: 2+ equal bilaterally.   EDEMA: no LE edema bilaterally  ABDOMEN: soft, nondistended, nontender, bowel sounds normal  MS: extremities normal - no gross deformities noted, no evidence of inflammation in joints, no muscle tenderness  SKIN: no rash     Results:  N/A    Again, thank you for allowing me to participate in the care of your patient.      Sincerely,    RUPA Perez CNP      "

## 2018-04-05 NOTE — PROGRESS NOTES
Assessment and Plan:  1. Kidney transplant wait list evaluation - Mr. Villalobos is a excellent candidate overall. He should be active on the wait list.  2. ESKD from unclear etiology - on peritoneal dialysis since 2017 complicated by peritonitis in 2017 and PD catheter coiling with appendicitis s/p appendectomy (2018). On HD from 2018-3/2018, now back on PD. No new potential donors. Would benefit from a kidney transplant.    3. Cardiac risk - no history of cardiac disease or events. Last stress test negative in . Last ECHO in 2017 showed an EF of 55-60%. No exertional symptoms.   4. Minimally complex renal cyst measuring 6.7 cm in the inferior pole of the left kidney (Bosniak 2) - noted on imaging from 2018. Will need follow up 6 month renal US.   5. Bilateral pulmonary nodules measuring up to 4 mm - noted on CT chest form 2018. Will need 12 month follow up CT chest. No smoking history.    6. Health maintenance - colonoscopy done in 3/2017. PSA 1.52. Dental UTD.      Discussed the risks and benefits of a transplant, including the risk of surgery and immunosuppression medications.    KDPI: We discussed approximate remaining wait time and how that is influenced by issues such as blood type and sensitization (PRA) and access to a living donor. I contrasted potential waiting time for living vs  donor kidneys from  normal (0-85%) or higher (%) kidney donor profile index (KDPI) donors and their associated outcomes. I would recommend Mr. Villalobos to consider kidneys from high KDPI donors. The reason for this decision is best summarized as: decreased dialysis related morbidity/mortality, accepting lower kidney graft survival rates.    Patient s overall re-evaluation may require further discussion in the Transplant Program s multidisciplinary selection committee for a final recommendation on the patient s suitability for transplant.     Reason for Visit:  Cesar Villalobos is a 52 year old male  with ESKD from unclear etiology, who presents for Kidney transplant wait list evaluation.     Last Evaluation Clinic Visit Date: 7/2014 ([facility])        Wait List Date: 10/1/2014  Current phase/status: Waitlist: Active as of 2/5/2018  Transplant coordinator: Marissa Escobar Transplant Office phone number [phone]     Previous Medical Issues:  N/A     HPI: Mr. Villalobos is a 52-year-old that presents for wait list evaluation with PMH of ESKD secondary to unclear cause, on PD since 6/2017, UTI with gross hematuria in his late 30s. He has been wait listed since 10/2014.              Interim events/issues/events - since Mr. Villalobos was last seen he had a few hospitalizations for peritonitis and PD catheter issues which are detailed below. He did not require any blood transfusions. 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 Overall, he is feeling fairly well, but does complain of some right-sided flank pain after dialysis runs. Denies fevers, chills or sweats.     Hospitalizations    9/2017 - peritonitis and treated.     10/2017 - treated in ED for suspected peritonitis. No cultures collected.     1/2018 - PD cath repositioned d/t coiling around appendix. Complicated by appendicitis s/p laparoscopy appendectomy.     3/2018 - PD removal from left side and reinsertion on right side of PD cath. With new catheter he reports pain with the machine, so he is doing his PD manually now. Still complains of right flank pain. 4 exchanges per day with some mild pain at the end.     Imaging     1/2018 - CT chest showed bilateral pulmonary nodules measuring up to 4 mm and indeterminate liver hypodensities. Follow up abd/pelvis CT showed hepatic mass lesions being hemangiomas. No suspicious hepatic lesions.     2/2018 - CT abd/pelvis showed mildly atrophic bilateral native kidneys with scattered simple cysts and a dominant minimally complex cyst measuring 6.7 cm in the inferior pole of the left kidney,  Julien 2.      Cardiac     8/2013 - normal stress test.     9/2017 - ECHO showed an EF of 55-60%.     He has no history of cardiac disease or events. He is not getting any regular exercise due to fatigue, however, before he started dialysis he use to enjoy golfing and biking. He reports he can walk a few miles now and needs to stop due to fatigue, but denies chest pain, shortness of breath or claudications symptoms. Last summer he was riding his bike to work.     Dialysis  In the setting of PD catheter problem, he was on HD from 1/2018-3/2018. His chest cath is still in place, while his new PD catheter on the left is working well without any signs of site infection or peritonitis. He does complain of some right flank pain after his runs that was worse with his machine, so now he is doing PD manually now. He is making good urine output and denies dysuria, hematuria or trouble emptying his bladder or starting his stream. His complains of fatigue, but denies nausea, vomiting, and poor appetite. He has not been taking his BPs medications for the past few months due to hypotension during his infections. SBPs have more recently ranged 120-130s. He denies light-headedness, dizziness or syncope.                  Kidney Disease Hx       Kidney Disease Dx: unclear cause        Biopsy Proven: No        On Dialysis: Yes, Date initiated: 6/2017 and Dialysis Type: PD;       Primary Nephrologist: Dr. Muñiz           Uremic Symptoms: Fatigue: Yes; Nausea: Yes;          Potential Donor(s): No          Cardiac history:       Last cardiac risk assessment: N/A       Last stress test/coronary angiogram: 2013       Exertional symptoms: none        Recent cardiac events: none      Pertinent issues:   Blood transfusion: No  Jehovah s Witness: No  Pregnancies: No  Previous transplant: No  Urine output: Yes  Bladder dysfunction: No  Claudication: No  Previous amputation: No  Cancer: No  Recurrent infection: No  Chronic anticoagulation:  No      ROS:  A comprehensive review of systems was obtained and negative, except as noted in the HPI or PMH.     PMH:  Medical records were obtained and reviewed.  Past Medical History:   Diagnosis Date     Anemia in chronic kidney disease      Dyslipidemia      End stage renal disease on dialysis (H)      Hypertension         PSH:  Past Surgical History:   Procedure Laterality Date     HERNIA REPAIR, INGUINAL RT/LT Left     as child     LAPAROSCOPIC APPENDECTOMY N/A 1/9/2018    Procedure: LAPAROSCOPIC APPENDECTOMY;  Laparoscopic Appendectomy ;  Surgeon: Caden Tay MD;  Location: [facility] OR     LAPAROSCOPIC INSERTION CATHETER PERITONEAL DIALYSIS N/A 6/6/2017    Procedure: LAPAROSCOPIC INSERTION CATHETER PERITONEAL DIALYSIS;  Laparoscopic Peritoneal Dialysis Catheter Placement. ;  Surgeon: Caden Tay MD;  Location: [facility] OR     LAPAROSCOPIC INSERTION CATHETER PERITONEAL DIALYSIS N/A 1/5/2018    Procedure: LAPAROSCOPIC INSERTION CATHETER PERITONEAL DIALYSIS;  Laparoscopic Repositioning of Peritoneal Dialysis Catheter.;  Surgeon: Caden Tay MD;  Location: [facility] OR     LAPAROSCOPIC INSERTION CATHETER PERITONEAL DIALYSIS N/A 1/9/2018    Procedure: LAPAROSCOPIC INSERTION CATHETER PERITONEAL DIALYSIS;;  Surgeon: Caden Tay MD;  Location: [facility] OR     LAPAROSCOPIC INSERTION CATHETER PERITONEAL DIALYSIS N/A 3/7/2018    Procedure: LAPAROSCOPIC INSERTION CATHETER PERITONEAL DIALYSIS;  Laparoscopic Removal and Re-insertion Of Peritoneal Dialysis Catheter ;  Surgeon: Caden Tay MD;  Location: [facility] OR        Personal Hx:  Social History     Social History     Marital status: Single     Spouse name: mike     Number of children: 1     Years of education: N/A     Occupational History      [employer]     Social History Main Topics     Smoking status: Never Smoker     Smokeless tobacco: Never Used     Alcohol use No     Drug use: No     Sexual activity: Yes     Other Topics Concern     Exercise No  "    Social History Narrative        Allergies:  Allergies   Allergen Reactions     Nsaids      Swelling and Hives          Medications:  Prior to Admission medications    Medication Sig Start Date End Date Taking? Authorizing Provider   sevelamer (RENVELA) 800 MG tablet Take 4 tablets (3,200 mg) by mouth 3 times daily (with meals) 3/22/18   Merline Muñiz MD   LISINOPRIL PO Take 20 mg by mouth    Reported, Patient   DILTIAZEM HCL PO Take 180 mg by mouth daily    Reported, Patient   oxyCODONE IR (ROXICODONE) 5 MG tablet Take 1-2 tablets (5-10 mg) by mouth every 6 hours as needed for pain maximum 6 tablet(s) per day 3/7/18   Aleksandra Vieira MD   acetaminophen (TYLENOL) 325 MG tablet Take 2 tablets (650 mg) by mouth every 4 hours as needed for mild pain 3/7/18   Aleksandra Vieira MD   senna (SENOKOT) 8.6 MG tablet Take 1 tablet by mouth daily  Patient taking differently: Take 2 tablets by mouth 2 times daily as needed  1/5/18   Antonio Edge MD   gentamicin (GARAMYCIN) 0.1 % cream Apply topically daily 12/18/17   Merline Muñiz MD   calcium acetate (PHOSLO) 667 MG CAPS capsule Take 3 capsules (2,001 mg) by mouth 3 times daily (with meals) 10/23/17   Merline Muñiz MD   sodium bicarbonate 650 MG tablet Take 2 tablets (1,300 mg) by mouth 2 times daily 9/22/17   Merline Muñiz MD   atorvastatin (LIPITOR) 10 MG tablet Take 1 tablet (10 mg) by mouth daily 9/22/17   Merline Muñiz MD   B Complex-C-Folic Acid (DIALYVITE 800) 0.8 MG TABS Take 1 tablet by mouth daily 9/22/17   Merline Muñiz MD   fish oil-omega-3 fatty acids (FISH OIL) 1000 MG capsule Take 1 capsule by mouth daily.    Reported, Patient        Vitals:  /89  Pulse 93  Temp 98  F (36.7  C) (Oral)  Ht 1.88 m (6' 2\")  Wt 100.2 kg (221 lb)  SpO2 96%  BMI 28.37 kg/m2     Exam:  GENERAL APPEARANCE: alert and no distress  HENT: mouth without ulcers or lesions. Good dentition.   LYMPHATICS: no cervical or " supraclavicular nodes  RESP: lungs clear to auscultation - no rales, rhonchi or wheezes  CV: regular rhythm, normal rate, no rub, no murmur  FEMORAL PULSES: 2+ equal bilaterally.   EDEMA: no LE edema bilaterally  ABDOMEN: soft, nondistended, nontender, bowel sounds normal  MS: extremities normal - no gross deformities noted, no evidence of inflammation in joints, no muscle tenderness  SKIN: no rash     Results:  N/A

## 2018-04-05 NOTE — NURSING NOTE
Chief Complaint   Patient presents with     RECHECK     CKD stage 5   Pt roomed, vitals, meds, and allergies reviewed with pt. Pt ready for provider.  Ted Ibrahim, CMA

## 2018-04-06 LAB — PRA SINGLE ANTIGEN IGG ANTIBODY: NORMAL

## 2018-04-18 PROBLEM — E87.5 HYPERKALEMIA: Status: RESOLVED | Noted: 2018-01-09 | Resolved: 2018-04-18

## 2018-04-18 PROBLEM — K37 APPENDICITIS: Status: RESOLVED | Noted: 2018-01-08 | Resolved: 2018-04-18

## 2018-04-23 ENCOUNTER — TELEPHONE (OUTPATIENT)
Dept: UROLOGY | Facility: CLINIC | Age: 53
End: 2018-04-23

## 2018-04-25 NOTE — TELEPHONE ENCOUNTER
PA Initiation    Medication: sevelamer - initiated   Insurance Company: [employer] - Phone [phone] Fax [phone]  Pharmacy Filling the Rx: [id number] - [place], [place] - [address] AT [facility] [address]  Filling Pharmacy Phone:    Filling Pharmacy Fax:    Start Date: 4/25/2018

## 2018-05-02 DIAGNOSIS — Z45.2 ADJUSTMENT AND MANAGEMENT OF VASCULAR ACCESS DEVICE: ICD-10-CM

## 2018-05-02 DIAGNOSIS — Z99.2 ESRD ON DIALYSIS (H): Primary | ICD-10-CM

## 2018-05-02 DIAGNOSIS — N18.6 ESRD ON DIALYSIS (H): Primary | ICD-10-CM

## 2018-05-02 NOTE — TELEPHONE ENCOUNTER
PRIOR AUTHORIZATION DENIED    Medication: sevelamer - denied     Denial Date: 5/2/2018    Denial Rational:  Must try and fail one formulary alternative - Sensipar       Appeal Information:

## 2018-05-04 ENCOUNTER — TELEPHONE (OUTPATIENT)
Dept: GENERAL RADIOLOGY | Facility: CLINIC | Age: 53
End: 2018-05-04

## 2018-05-04 ENCOUNTER — DOCUMENTATION ONLY (OUTPATIENT)
Dept: VASCULAR SURGERY | Facility: CLINIC | Age: 53
End: 2018-05-04

## 2018-05-04 ENCOUNTER — TELEPHONE (OUTPATIENT)
Dept: VASCULAR SURGERY | Facility: CLINIC | Age: 53
End: 2018-05-04

## 2018-05-04 NOTE — PROGRESS NOTES
Line removal appointment scheduled on 05/07/2018 information was faxed to [facility].

## 2018-05-04 NOTE — TELEPHONE ENCOUNTER
----- Message from RUAP Muñoz CNS sent at 5/2/2018  8:35 AM CDT -----  Regarding: IR CVC tunneled line removal - level 2  Hello,  Please call IR  at [phone] to schedule IR CVC tunneled line removal next week AFTER 5/7/5018   # Patient is out of town this week and returns on 5/7/18#    Level 2 - IR CVC tunneled line removal can be scheduled with 1-2 weeks D/T risk of infections    Dialysis days: Home daily  Please contact patient for instructions   [facility]    Thanks  Sum

## 2018-05-04 NOTE — TELEPHONE ENCOUNTER
Per task, procedure was scheduled for 05/09/2018 and patient was called. Voicemail was full and no other number is available. [employer] message was sent to patient with appointment details.

## 2018-05-09 ENCOUNTER — RADIANT APPOINTMENT (OUTPATIENT)
Dept: ULTRASOUND IMAGING | Facility: CLINIC | Age: 53
End: 2018-05-09
Attending: CLINICAL NURSE SPECIALIST
Payer: COMMERCIAL

## 2018-05-09 DIAGNOSIS — E83.39 HYPERPHOSPHATEMIA: Primary | ICD-10-CM

## 2018-05-09 DIAGNOSIS — N18.6 ESRD ON DIALYSIS (H): ICD-10-CM

## 2018-05-09 DIAGNOSIS — Z01.812 PRE-PROCEDURE LAB EXAM: Primary | ICD-10-CM

## 2018-05-09 DIAGNOSIS — Z01.812 PRE-PROCEDURE LAB EXAM: ICD-10-CM

## 2018-05-09 DIAGNOSIS — Z45.2 ADJUSTMENT AND MANAGEMENT OF VASCULAR ACCESS DEVICE: ICD-10-CM

## 2018-05-09 DIAGNOSIS — Z99.2 ESRD ON DIALYSIS (H): ICD-10-CM

## 2018-05-09 DIAGNOSIS — N25.81 SECONDARY RENAL HYPERPARATHYROIDISM (H): ICD-10-CM

## 2018-05-09 LAB
INR PPP: 0.89 (ref 0.86–1.14)
PLATELET # BLD AUTO: 183 10E9/L (ref 150–450)

## 2018-05-09 RX ORDER — LIDOCAINE HYDROCHLORIDE 10 MG/ML
5 INJECTION, SOLUTION EPIDURAL; INFILTRATION; INTRACAUDAL; PERINEURAL ONCE
Status: COMPLETED | OUTPATIENT
Start: 2018-05-09 | End: 2018-05-09

## 2018-05-09 RX ADMIN — LIDOCAINE HYDROCHLORIDE 5 ML: 10 INJECTION, SOLUTION EPIDURAL; INFILTRATION; INTRACAUDAL; PERINEURAL at 12:28

## 2018-05-09 NOTE — DISCHARGE INSTRUCTIONS
A collaboration between [facility] and [facility]  Experts in minimally invasive, targeted treatments performed using imaging guidance    Venous Access Device, Port Catheter or Tunneled Central Line Removal    Today you had your existing venous access device removed, either because it was no longer needed or because there was malfunction or infection issues.    One of our Radiology PAs performed this procedure for you today:  ? Hola [place], [facility] [place]  ? CHANI Aapricio, PA-C  ? Juliocesar Lino PA-C  ? Rina Woody MS, PA-C  ? Basilio Bolivar PA-C    After you go home:  - Drink plenty of fluids.  Generally 6-8 (8 ounce) glasses a day is recommended.  - Resume your regular diet unless otherwise ordered by a medical provider.  - Keep any applied tape/gauze dressings clean and dry.  Change tape/gauze dressings if they get wet or soiled.  - You may shower the following day after procedure, however cover and protect from moisture any tape/gauze dressings.  You may let water hit and run over dried skin glue, but do not scrub.  Pat the area dry after showering.  - Port removal incisions are closed with absorbable suture, meaning they do not need to be removed at a later date, and a topical skin adhesive (skin glue).  This glue will wear off in 7-14 days.  Do not remove before this time.  If 14 days have passed and residual glue is present, you may gently remove it.  - You may remove tape/gauze dressings after 5 days if the site looks closed and in the process of healing.  - Do not apply gels, lotions, or ointments to the glue site for the first 10 days as this may cause the glue to prematurely soften and fail.  - Do not perform strenuous activities or lift greater than 10 pounds for the next three days.  - If there is bleeding or oozing from the procedure site, apply firm pressure to the area for 5-10 minutes.  If the bleeding continues seek medical advice at the  numbers below.  - Mild procedure site discomfort can be treated with an ice pack and over-the-counter pain relievers.              For 24 hours after any sedation used:  - Relax and take it easy.  No strenuous activities.  - Do not drive or operate machines at home or at work.  - No alcohol consumption.  - Do not make any important or legal decisions.    Call our Interventional Radiology (IR) service if:  - If you start bleeding from the procedure site.  If you do start to bleed from the site, lie down and hold some pressure on the site.  Our radiology provider can help you decide if you need to return to the hospital.  - If you have new or worsening pain related to the procedure.  - If you have concerning swelling at the procedure site.  - If you develop persistent nausea or vomiting.  - If you develop hives or a rash or any unexplained itching.  - If you have a fever of greater than 100.5  F and chills in the first 5 days after procedure.  - Any other concerns related to your procedure.      [facility]  Interventional Radiology (IR)  [address] [place] [address]    Contact Number:  [phone]  (IR control desk)  - Monday - Friday 8:00 am - 4:30 pm    After hours for urgent concerns:  [phone]  - After 4:30 pm Monday - Friday, Weekends and Holidays.   - Ask for Interventional Radiology on-call.  Someone is available 24 hours a day.  - [facility] toll free number:  [phone]

## 2018-05-09 NOTE — PROGRESS NOTES
Interventional Radiology Brief Post Procedure Note    Procedure:  Tunneled central venous catheter removal    Proceduralist: Basilio Bolivar PA-C    Assistant: None    Time Out: Prior to the start of the procedure and with procedural staff participation, I verbally confirmed the patient s identity using two indicators, relevant allergies, that the procedure was appropriate and matched the consent or emergent situation, and that the correct equipment/implants were available. Immediately prior to starting the procedure I conducted the Time Out with the procedural staff and re-confirmed the patient s name, procedure, and site/side. (The Joint Commission universal protocol was followed.)  Yes    Medications   Medication Event Details Admin User Admin Time       Sedation: None. Local Anesthestic used    Findings: Patient with tunneled central venous catheter in the right internal jugular vein for hemodialysis.  Patient is now on peritoneal dialysis and no longer requires his tunneled central venous catheter.  14.5 [place] tunneled central venous catheter is removed in its entirety with a moderate amount of blunt dissection and traction.  Patient tolerated the procedure well.    Estimated Blood Loss: Minimal  Follow-up per primary team  Fluoroscopy Time: 0 minute(s)    SPECIMENS: None    Complications: 1. None     Condition: Stable    Plan: Follow-up per primary team    Comments: See dictated procedure note for full details.    Basilio Bolivar PA-C

## 2018-05-09 NOTE — MR AVS SNAPSHOT
MRN:[id number]                      After Visit Summary   5/9/2018    Cesar Villalobos    MRN: [id number]           Visit Information        Provider Department      5/9/2018 11:30 AM [facility];  IMAGING NURSE; [address] [facility]            Review of your medicines          These changes are accurate as of 5/9/18 11:41 AM.  If you have any questions, ask your nurse or doctor.               CONTINUE these medicines which may have CHANGED, or have new prescriptions. If we are uncertain of the size of tablets/capsules you have at home, strength may be listed as something that might have changed.        Dose / Directions    senna 8.6 MG tablet   Commonly known as:  SENOKOT   This may have changed:    - how much to take  - when to take this  - reasons to take this   Used for:  ESRD on peritoneal dialysis (H)        Dose:  1 tablet   Take 1 tablet by mouth daily   Quantity:  100 tablet   Refills:  0         CONTINUE these medicines which have NOT CHANGED        Dose / Directions    atorvastatin 10 MG tablet   Commonly known as:  LIPITOR   Used for:  Mixed hyperlipidemia        Dose:  10 mg   Take 1 tablet (10 mg) by mouth daily   Quantity:  90 tablet   Refills:  3       calcium acetate 667 MG Caps capsule   Commonly known as:  PHOSLO   Used for:  Hyperphosphatemia        Dose:  2001 mg   Take 3 capsules (2,001 mg) by mouth 3 times daily (with meals)   Quantity:  270 capsule   Refills:  11       DIALYVITE 800 0.8 MG Tabs   Used for:  ESRD (end stage renal disease) on dialysis (H)        Dose:  1 tablet   Take 1 tablet by mouth daily   Quantity:  90 tablet   Refills:  3       DILTIAZEM HCL PO        Dose:  180 mg   Take 180 mg by mouth daily   Refills:  0       fish oil-omega-3 fatty acids 1000 MG capsule        Dose:  1 capsule   Take 1 capsule by mouth daily.   Refills:  0       gentamicin 0.1 % cream   Commonly known as:  GARAMYCIN   Used for:  Peritoneal dialysis catheter dysfunction,  subsequent encounter (H)        Apply topically daily   Quantity:  30 g   Refills:  11       LISINOPRIL PO        Dose:  20 mg   Take 20 mg by mouth   Refills:  0       sevelamer 800 MG tablet   Commonly known as:  RENVELA   Used for:  Secondary renal hyperparathyroidism (H)        Dose:  3200 mg   Take 4 tablets (3,200 mg) by mouth 3 times daily (with meals)   Quantity:  1000 tablet   Refills:  3       sodium bicarbonate 650 MG tablet   Used for:  Acidosis        Dose:  1300 mg   Take 2 tablets (1,300 mg) by mouth 2 times daily   Quantity:  360 tablet   Refills:  3                Protect others around you: Learn how to safely use, store and throw away your medicines at www.disposemymeds.org.         Follow-ups after your visit         Care Instructions        Further instructions from your care team         A collaboration between [facility] and [facility]  Experts in minimally invasive, targeted treatments performed using imaging guidance    Venous Access Device, Port Catheter or Tunneled Central Line Removal    Today you had your existing venous access device removed, either because it was no longer needed or because there was malfunction or infection issues.    One of our Radiology PAs performed this procedure for you today:  ? [facility]-C  ? CHANI Aparicio PA-C  ? Juliocesar Lino PA-C  ? Rina Woody MS, PA-C  ? Basilio Bolivar PA-C    After you go home:  - Drink plenty of fluids.  Generally 6-8 (8 ounce) glasses a day is recommended.  - Resume your regular diet unless otherwise ordered by a medical provider.  - Keep any applied tape/gauze dressings clean and dry.  Change tape/gauze dressings if they get wet or soiled.  - You may shower the following day after procedure, however cover and protect from moisture any tape/gauze dressings.  You may let water hit and run over dried skin glue, but do not scrub.  Pat the area dry after  showering.  - Port removal incisions are closed with absorbable suture, meaning they do not need to be removed at a later date, and a topical skin adhesive (skin glue).  This glue will wear off in 7-14 days.  Do not remove before this time.  If 14 days have passed and residual glue is present, you may gently remove it.  - You may remove tape/gauze dressings after 5 days if the site looks closed and in the process of healing.  - Do not apply gels, lotions, or ointments to the glue site for the first 10 days as this may cause the glue to prematurely soften and fail.  - Do not perform strenuous activities or lift greater than 10 pounds for the next three days.  - If there is bleeding or oozing from the procedure site, apply firm pressure to the area for 5-10 minutes.  If the bleeding continues seek medical advice at the numbers below.  - Mild procedure site discomfort can be treated with an ice pack and over-the-counter pain relievers.              For 24 hours after any sedation used:  - Relax and take it easy.  No strenuous activities.  - Do not drive or operate machines at home or at work.  - No alcohol consumption.  - Do not make any important or legal decisions.    Call our Interventional Radiology (IR) service if:  - If you start bleeding from the procedure site.  If you do start to bleed from the site, lie down and hold some pressure on the site.  Our radiology provider can help you decide if you need to return to the hospital.  - If you have new or worsening pain related to the procedure.  - If you have concerning swelling at the procedure site.  - If you develop persistent nausea or vomiting.  - If you develop hives or a rash or any unexplained itching.  - If you have a fever of greater than 100.5  F and chills in the first 5 days after procedure.  - Any other concerns related to your procedure.      [facility]  Interventional Radiology (IR)  [address]  [place] [address]    Contact Number:  [phone]  (IR control desk)  - Monday - Friday 8:00 am - 4:30 pm    After hours for urgent concerns:  [phone]  - After 4:30 pm Monday - Friday, Weekends and Holidays.   - Ask for Interventional Radiology on-call.  Someone is available 24 hours a day.  - [facility] toll free number:  [phone]         Additional Information About Your Visit        [employer] Information     [employer] gives you secure access to your electronic health record. If you see a primary care provider, you can also send messages to your care team and make appointments. If you have questions, please call your primary care clinic.  If you do not have a primary care provider, please call [phone] and they will assist you.      [employer] is an electronic gateway that provides easy, online access to your medical records. With [employer], you can request a clinic appointment, read your test results, renew a prescription or communicate with your care team.     To access your existing account, please contact your [facility] or call [phone] for assistance.        Care EveryWhere ID     This is your Care EveryWhere ID. This could be used by other organizations to access your [facility] medical records  [id number]         Primary Care Provider Office Phone # Fax #    Sallie Olsen [phone]      Equal Access to Services     COLLEEN DEWEY : Brittany Flores, luciano ferreira, qaybchandrakant yeh. So [facility] [phone].    ATENCIÓN: Si habla español, tiene a coe disposición servicios gratuitos de asistencia lingüística. Llame al [phone].    We comply with applicable federal civil rights laws and [place] laws. We do not discriminate on the basis of race, color, national origin, age, disability, sex, sexual orientation, or gender identity.            Thank you!     Thank you for  choosing [facility] for your care. Our goal is always to provide you with excellent care. Hearing back from our patients is one way we can continue to improve our services. Please take a few minutes to complete the written survey that you may receive in the mail after you visit with us. Thank you!             Medication List: This is a list of all your medications and when to take them. Check marks below indicate your daily home schedule. Keep this list as a reference.          This list is accurate as of 5/9/18 11:41 AM.  Always use your most recent med list.               Medications           Morning Afternoon Evening Bedtime As Needed    atorvastatin 10 MG tablet   Commonly known as:  LIPITOR   Take 1 tablet (10 mg) by mouth daily                                calcium acetate 667 MG Caps capsule   Commonly known as:  PHOSLO   Take 3 capsules (2,001 mg) by mouth 3 times daily (with meals)                                DIALYVITE 800 0.8 MG Tabs   Take 1 tablet by mouth daily                                DILTIAZEM HCL PO   Take 180 mg by mouth daily                                fish oil-omega-3 fatty acids 1000 MG capsule   Take 1 capsule by mouth daily.                                gentamicin 0.1 % cream   Commonly known as:  GARAMYCIN   Apply topically daily                                LISINOPRIL PO   Take 20 mg by mouth                                senna 8.6 MG tablet   Commonly known as:  SENOKOT   Take 1 tablet by mouth daily                                sevelamer 800 MG tablet   Commonly known as:  RENVELA   Take 4 tablets (3,200 mg) by mouth 3 times daily (with meals)                                sodium bicarbonate 650 MG tablet   Take 2 tablets (1,300 mg) by mouth 2 times daily

## 2018-06-08 DIAGNOSIS — Z76.82 ORGAN TRANSPLANT CANDIDATE: Primary | ICD-10-CM

## 2018-06-08 DIAGNOSIS — N18.6 END STAGE RENAL DISEASE (H): ICD-10-CM

## 2018-06-27 DIAGNOSIS — N25.81 SECONDARY RENAL HYPERPARATHYROIDISM (H): ICD-10-CM

## 2018-06-27 DIAGNOSIS — E83.39 HYPERPHOSPHATEMIA: Primary | ICD-10-CM

## 2018-06-27 RX ORDER — CINACALCET 30 MG/1
30 TABLET, FILM COATED ORAL DAILY
Qty: 30 TABLET | Refills: 11 | Status: SHIPPED | OUTPATIENT
Start: 2018-06-27 | End: 2018-11-12

## 2018-06-27 RX ORDER — LANTHANUM CARBONATE 1000 MG/1
1000 TABLET, CHEWABLE ORAL
Qty: 90 TABLET | Refills: 11 | Status: SHIPPED | OUTPATIENT
Start: 2018-06-27 | End: 2018-11-12

## 2018-06-28 ENCOUNTER — TELEPHONE (OUTPATIENT)
Dept: NEPHROLOGY | Facility: CLINIC | Age: 53
End: 2018-06-28

## 2018-06-28 NOTE — TELEPHONE ENCOUNTER
Prior Authorization Retail Medication Request    Medication/Dose: SENSIPAR 30MG TAB  ICD code (if different than what is on RX):     Hyperphosphatemia [E83.39]  - Primary         Secondary renal hyperparathyroidism (H) [N25.81]       Previously Tried and Failed:    Rationale:      Insurance Name:  [facility]  Insurance ID:  [id number]  BIN: [id number]  PCN: [facility]    Pharmacy Information (if different than what is on RX)  Name:  [facility] #[id number]  Phone:  [phone]

## 2018-06-29 NOTE — TELEPHONE ENCOUNTER
Central Prior Authorization Team   Phone: [phone]      PA Initiation    Medication: SENSIPAR 30MG TAB  Insurance Company: [employer] - Phone [phone] Fax [phone]  Pharmacy Filling the Rx: [id number] - [place], [place] - [facility] [address] AT [facility] [address]  Filling Pharmacy Phone: [phone]  Filling Pharmacy Fax: [phone]  Start Date: 6/29/2018

## 2018-07-05 ENCOUNTER — TELEPHONE (OUTPATIENT)
Dept: TRANSPLANT | Facility: CLINIC | Age: 53
End: 2018-07-05

## 2018-07-05 NOTE — TELEPHONE ENCOUNTER
Spoke with patient and let him know that is he is our backup for the LDKT on 7/10/2018 and that I will call him on 7/10 to affirm that is was not the primary; certainly he would know sooner if he was to become the primary. Patient verbalized understanding and has no further questions at this time.

## 2018-07-05 NOTE — TELEPHONE ENCOUNTER
Prior Authorization Approval    Authorization Effective Date: 7/2/2018  Authorization Expiration Date: 7/2/2019  Medication: SENSIPAR 30MG TAB- P/A APPROVED  Approved Dose/Quantity: 30  Reference #: [id number]   Insurance Company: [employer] - Phone [phone] Fax [phone]  Expected CoPay:       CoPay Card Available:      Foundation Assistance Needed:    Which Pharmacy is filling the prescription (Not needed for infusion/clinic administered): [id number] - [place], [place] - [address] AT [facility] [address]  Pharmacy Notified: Yes  Patient Notified:

## 2018-07-09 ENCOUNTER — MYC MEDICAL ADVICE (OUTPATIENT)
Dept: NEPHROLOGY | Facility: CLINIC | Age: 53
End: 2018-07-09

## 2018-07-09 DIAGNOSIS — K76.9 LIVER LESION: Primary | ICD-10-CM

## 2018-07-10 ENCOUNTER — TELEPHONE (OUTPATIENT)
Dept: TRANSPLANT | Facility: CLINIC | Age: 53
End: 2018-07-10

## 2018-07-10 NOTE — TELEPHONE ENCOUNTER
Coordinator called pt to tell him that he did not become primary for kidney transplant chain today. Pt verbalized understanding.

## 2018-07-11 NOTE — TELEPHONE ENCOUNTER
Reviewed with Dr. Taylor.  Cesar has CT 2/1/18 that showed benign hemangioma.  Cesar does not need any further imaging for liver lesions.  I relayed this to him.  Merline Muñiz MD

## 2018-07-18 ENCOUNTER — TELEPHONE (OUTPATIENT)
Dept: CARDIOLOGY | Facility: CLINIC | Age: 53
End: 2018-07-18

## 2018-07-25 ENCOUNTER — TELEPHONE (OUTPATIENT)
Dept: TRANSPLANT | Facility: CLINIC | Age: 53
End: 2018-07-25

## 2018-08-14 ENCOUNTER — TELEPHONE (OUTPATIENT)
Dept: TRANSPLANT | Facility: CLINIC | Age: 53
End: 2018-08-14

## 2018-08-27 DIAGNOSIS — R05.3 CHRONIC COUGH: Primary | ICD-10-CM

## 2018-08-29 ENCOUNTER — DOCUMENTATION ONLY (OUTPATIENT)
Dept: TRANSPLANT | Facility: CLINIC | Age: 53
End: 2018-08-29

## 2018-08-29 ENCOUNTER — TEAM CONFERENCE (OUTPATIENT)
Dept: TRANSPLANT | Facility: CLINIC | Age: 53
End: 2018-08-29

## 2018-08-29 ENCOUNTER — TELEPHONE (OUTPATIENT)
Dept: TRANSPLANT | Facility: CLINIC | Age: 53
End: 2018-08-29

## 2018-08-29 NOTE — TELEPHONE ENCOUNTER
Team Conference:      Attendees: Jamison Newell Dunn, Matas, Schenk,   Coordinator, , Dietician, Pharmacy    Discussion and Outcome: Patient status changed to removed approved. Patient transferred waiting time to [facility].

## 2018-08-29 NOTE — TELEPHONE ENCOUNTER
Coordinator called pt to confirm we received notification from [facility] that pt transferred his wait time from our center to [facility]. Pt provided feedback on why he transferred centers. Coordinator thanked pt for feedback and offered her supervisor to contact pt. Pt states he would like to speak with supervisor and is impressed we asked for feedback. Coordinator wished pt best of luck and to reach out in the future if there is anything he needs.

## 2018-09-10 DIAGNOSIS — I15.1 SECONDARY HYPERTENSION DUE TO RENAL DISEASE: Primary | ICD-10-CM

## 2018-09-10 RX ORDER — DILTIAZEM HYDROCHLORIDE 180 MG/1
180 CAPSULE, COATED, EXTENDED RELEASE ORAL DAILY
COMMUNITY
Start: 2018-09-10 | End: 2018-11-12

## 2018-09-20 ENCOUNTER — MEDICAL CORRESPONDENCE (OUTPATIENT)
Dept: HEALTH INFORMATION MANAGEMENT | Facility: CLINIC | Age: 53
End: 2018-09-20

## 2018-09-22 DIAGNOSIS — Z94.0 KIDNEY REPLACED BY TRANSPLANT: Primary | ICD-10-CM

## 2018-09-22 PROCEDURE — 36415 COLL VENOUS BLD VENIPUNCTURE: CPT

## 2018-10-15 ENCOUNTER — MEDICAL CORRESPONDENCE (OUTPATIENT)
Dept: HEALTH INFORMATION MANAGEMENT | Facility: CLINIC | Age: 53
End: 2018-10-15

## 2018-10-16 ENCOUNTER — MEDICAL CORRESPONDENCE (OUTPATIENT)
Dept: HEALTH INFORMATION MANAGEMENT | Facility: CLINIC | Age: 53
End: 2018-10-16

## 2018-10-19 ENCOUNTER — TRANSFERRED RECORDS (OUTPATIENT)
Dept: HEALTH INFORMATION MANAGEMENT | Facility: CLINIC | Age: 53
End: 2018-10-19

## 2018-10-23 DIAGNOSIS — Z94.0 KIDNEY REPLACED BY TRANSPLANT: Primary | ICD-10-CM

## 2018-10-23 DIAGNOSIS — Z79.899 NEED FOR PROPHYLACTIC CHEMOTHERAPY: ICD-10-CM

## 2018-10-23 LAB
BASOPHILS # BLD AUTO: 0 10E9/L (ref 0–0.2)
BASOPHILS NFR BLD AUTO: 0.9 %
CREAT SERPL-MCNC: 1.36 MG/DL (ref 0.66–1.25)
DIFFERENTIAL METHOD BLD: ABNORMAL
EOSINOPHIL # BLD AUTO: 0 10E9/L (ref 0–0.7)
EOSINOPHIL NFR BLD AUTO: 0.4 %
ERYTHROCYTE [DISTWIDTH] IN BLOOD BY AUTOMATED COUNT: 14.3 % (ref 10–15)
GFR SERPL CREATININE-BSD FRML MDRD: 54 ML/MIN/1.7M2
GLUCOSE SERPL-MCNC: 97 MG/DL (ref 70–99)
HCT VFR BLD AUTO: 35.6 % (ref 40–53)
HGB BLD-MCNC: 10.9 G/DL (ref 13.3–17.7)
LYMPHOCYTES # BLD AUTO: 0 10E9/L (ref 0.8–5.3)
LYMPHOCYTES NFR BLD AUTO: 0.9 %
MCH RBC QN AUTO: 30.1 PG (ref 26.5–33)
MCHC RBC AUTO-ENTMCNC: 30.6 G/DL (ref 31.5–36.5)
MCV RBC AUTO: 98 FL (ref 78–100)
MONOCYTES # BLD AUTO: 0 10E9/L (ref 0–1.3)
MONOCYTES NFR BLD AUTO: 1.7 %
NEUTROPHILS # BLD AUTO: 2.2 10E9/L (ref 1.6–8.3)
NEUTROPHILS NFR BLD AUTO: 96.1 %
PLATELET # BLD AUTO: 202 10E9/L (ref 150–450)
POTASSIUM SERPL-SCNC: 4.4 MMOL/L (ref 3.4–5.3)
RBC # BLD AUTO: 3.62 10E12/L (ref 4.4–5.9)
WBC # BLD AUTO: 2.3 10E9/L (ref 4–11)

## 2018-10-23 PROCEDURE — 85025 COMPLETE CBC W/AUTO DIFF WBC: CPT

## 2018-10-23 PROCEDURE — 84132 ASSAY OF SERUM POTASSIUM: CPT

## 2018-10-23 PROCEDURE — 82565 ASSAY OF CREATININE: CPT

## 2018-10-23 PROCEDURE — 82947 ASSAY GLUCOSE BLOOD QUANT: CPT

## 2018-10-23 PROCEDURE — 36415 COLL VENOUS BLD VENIPUNCTURE: CPT

## 2018-10-29 DIAGNOSIS — Z79.899 NEED FOR PROPHYLACTIC CHEMOTHERAPY: ICD-10-CM

## 2018-10-29 DIAGNOSIS — Z94.0 KIDNEY REPLACED BY TRANSPLANT: ICD-10-CM

## 2018-10-29 LAB
CREAT SERPL-MCNC: 1.4 MG/DL (ref 0.66–1.25)
GFR SERPL CREATININE-BSD FRML MDRD: 53 ML/MIN/1.7M2
GLUCOSE SERPL-MCNC: 154 MG/DL (ref 70–99)
POTASSIUM SERPL-SCNC: 4.1 MMOL/L (ref 3.4–5.3)

## 2018-10-29 PROCEDURE — 82947 ASSAY GLUCOSE BLOOD QUANT: CPT

## 2018-10-29 PROCEDURE — 36415 COLL VENOUS BLD VENIPUNCTURE: CPT

## 2018-10-29 PROCEDURE — 99000 SPECIMEN HANDLING OFFICE-LAB: CPT

## 2018-10-29 PROCEDURE — 82565 ASSAY OF CREATININE: CPT

## 2018-10-29 PROCEDURE — 84132 ASSAY OF SERUM POTASSIUM: CPT

## 2018-11-05 DIAGNOSIS — Z79.899 NEED FOR PROPHYLACTIC CHEMOTHERAPY: ICD-10-CM

## 2018-11-05 DIAGNOSIS — Z79.899 HIGH RISK MEDICATION USE: ICD-10-CM

## 2018-11-05 DIAGNOSIS — Z94.0 STATUS POST KIDNEY TRANSPLANT: Primary | ICD-10-CM

## 2018-11-05 DIAGNOSIS — Z94.0 KIDNEY REPLACED BY TRANSPLANT: ICD-10-CM

## 2018-11-05 DIAGNOSIS — D84.9 IMMUNOSUPPRESSED STATUS (H): ICD-10-CM

## 2018-11-05 LAB
BASOPHILS # BLD AUTO: 0 10E9/L (ref 0–0.2)
BASOPHILS NFR BLD AUTO: 0 %
CREAT SERPL-MCNC: 1.29 MG/DL (ref 0.66–1.25)
DIFFERENTIAL METHOD BLD: ABNORMAL
EOSINOPHIL # BLD AUTO: 0 10E9/L (ref 0–0.7)
EOSINOPHIL NFR BLD AUTO: 1.4 %
ERYTHROCYTE [DISTWIDTH] IN BLOOD BY AUTOMATED COUNT: 14.5 % (ref 10–15)
GFR SERPL CREATININE-BSD FRML MDRD: 58 ML/MIN/1.7M2
GLUCOSE SERPL-MCNC: 111 MG/DL (ref 70–99)
HCT VFR BLD AUTO: 39 % (ref 40–53)
HGB BLD-MCNC: 12.5 G/DL (ref 13.3–17.7)
LYMPHOCYTES # BLD AUTO: 0 10E9/L (ref 0.8–5.3)
LYMPHOCYTES NFR BLD AUTO: 1.4 %
MCH RBC QN AUTO: 30.9 PG (ref 26.5–33)
MCHC RBC AUTO-ENTMCNC: 32.1 G/DL (ref 31.5–36.5)
MCV RBC AUTO: 96 FL (ref 78–100)
MONOCYTES # BLD AUTO: 0.1 10E9/L (ref 0–1.3)
MONOCYTES NFR BLD AUTO: 4.5 %
NEUTROPHILS # BLD AUTO: 2.7 10E9/L (ref 1.6–8.3)
NEUTROPHILS NFR BLD AUTO: 92.7 %
PLATELET # BLD AUTO: 232 10E9/L (ref 150–450)
POTASSIUM SERPL-SCNC: 4.2 MMOL/L (ref 3.4–5.3)
RBC # BLD AUTO: 4.05 10E12/L (ref 4.4–5.9)
WBC # BLD AUTO: 2.9 10E9/L (ref 4–11)

## 2018-11-05 PROCEDURE — 85025 COMPLETE CBC W/AUTO DIFF WBC: CPT | Performed by: FAMILY MEDICINE

## 2018-11-05 PROCEDURE — 82947 ASSAY GLUCOSE BLOOD QUANT: CPT | Performed by: FAMILY MEDICINE

## 2018-11-05 PROCEDURE — 36415 COLL VENOUS BLD VENIPUNCTURE: CPT

## 2018-11-05 PROCEDURE — 82565 ASSAY OF CREATININE: CPT

## 2018-11-05 PROCEDURE — 84132 ASSAY OF SERUM POTASSIUM: CPT | Performed by: FAMILY MEDICINE

## 2018-11-12 ENCOUNTER — OFFICE VISIT (OUTPATIENT)
Dept: FAMILY MEDICINE | Facility: CLINIC | Age: 53
End: 2018-11-12
Payer: COMMERCIAL

## 2018-11-12 VITALS
OXYGEN SATURATION: 95 % | RESPIRATION RATE: 18 BRPM | WEIGHT: 220 LBS | BODY MASS INDEX: 28.25 KG/M2 | DIASTOLIC BLOOD PRESSURE: 50 MMHG | TEMPERATURE: 97.8 F | SYSTOLIC BLOOD PRESSURE: 81 MMHG | HEART RATE: 100 BPM

## 2018-11-12 DIAGNOSIS — Z94.0 S/P KIDNEY TRANSPLANT: ICD-10-CM

## 2018-11-12 PROCEDURE — 99213 OFFICE O/P EST LOW 20 MIN: CPT | Performed by: FAMILY MEDICINE

## 2018-11-12 RX ORDER — CARVEDILOL 3.12 MG/1
3.12 TABLET ORAL 2 TIMES DAILY WITH MEALS
Qty: 60 TABLET | Refills: 1 | COMMUNITY
Start: 2018-11-12 | End: 2021-03-03

## 2018-11-12 RX ORDER — CHOLECALCIFEROL (VITAMIN D3) 50 MCG
2000 TABLET ORAL
COMMUNITY
Start: 2018-10-19 | End: 2019-02-16

## 2018-11-12 RX ORDER — MYCOPHENOLATE MOFETIL 250 MG/1
500 CAPSULE ORAL 2 TIMES DAILY
COMMUNITY
Start: 2018-10-03 | End: 2020-10-26

## 2018-11-12 RX ORDER — VALGANCICLOVIR 450 MG/1
900 TABLET, FILM COATED ORAL
COMMUNITY
Start: 2018-10-05 | End: 2020-10-26

## 2018-11-12 RX ORDER — SULFAMETHOXAZOLE AND TRIMETHOPRIM 400; 80 MG/1; MG/1
1 TABLET ORAL
COMMUNITY
Start: 2018-10-05 | End: 2019-04-05

## 2018-11-12 RX ORDER — TACROLIMUS 1 MG/1
5 CAPSULE ORAL
COMMUNITY
Start: 2018-11-02

## 2018-11-12 NOTE — PROGRESS NOTES
SUBJECTIVE:   Cesar Villalobos is a 53 year old male who presents to clinic today for the following health issues:    Renal failure: Cesar has a history of Stage V chronic kidney disease of unknown etiology.  He is now s/p kidney transplant on 10/2/2018 and is doing great.  He follows with his specialists at [place].  He is currently taking bactrim, valgancyclovir, mycophenolate and tacrolimus.  He continues on his lipitor and a lower dose of coreg.  His BP is low today, but he denies any dizziness or fatigue; he states his BPs have been in the normal range.  He is coming for weekly lab draws.    He will need a letter so he can return to work.          Problem list and histories reviewed & adjusted, as indicated.  Additional history: as documented    Patient Active Problem List   Diagnosis     Hyperlipidemia     Organ transplant candidate     Secondary renal hyperparathyroidism (H)     Restless leg syndrome     Pulmonary nodules     Anemia in chronic kidney disease     End stage renal disease on dialysis (H)     Past Surgical History:   Procedure Laterality Date     COLONOSCOPY  March, 2017     HERNIA REPAIR, INGUINAL RT/LT Left     as child     LAPAROSCOPIC APPENDECTOMY N/A 1/9/2018    Procedure: LAPAROSCOPIC APPENDECTOMY;  Laparoscopic Appendectomy ;  Surgeon: Caden Tay MD;  Location: [facility] OR     LAPAROSCOPIC INSERTION CATHETER PERITONEAL DIALYSIS N/A 6/6/2017    Procedure: LAPAROSCOPIC INSERTION CATHETER PERITONEAL DIALYSIS;  Laparoscopic Peritoneal Dialysis Catheter Placement. ;  Surgeon: Caden Tay MD;  Location: [facility] OR     LAPAROSCOPIC INSERTION CATHETER PERITONEAL DIALYSIS N/A 1/5/2018    Procedure: LAPAROSCOPIC INSERTION CATHETER PERITONEAL DIALYSIS;  Laparoscopic Repositioning of Peritoneal Dialysis Catheter.;  Surgeon: Caden Tay MD;  Location: [facility] OR     LAPAROSCOPIC INSERTION CATHETER PERITONEAL DIALYSIS N/A 1/9/2018    Procedure: LAPAROSCOPIC INSERTION CATHETER PERITONEAL DIALYSIS;;   Surgeon: Caden Tay MD;  Location: [facility] OR     LAPAROSCOPIC INSERTION CATHETER PERITONEAL DIALYSIS N/A 3/7/2018    Procedure: LAPAROSCOPIC INSERTION CATHETER PERITONEAL DIALYSIS;  Laparoscopic Removal and Re-insertion Of Peritoneal Dialysis Catheter ;  Surgeon: Caden Tay MD;  Location: [facility] OR     TRANSPLANT  10/2/2018    Kidney       Social History   Substance Use Topics     Smoking status: Never Smoker     Smokeless tobacco: Never Used     Alcohol use 0.0 oz/week      Comment: barely, 1-2 drinks a month     Family History   Problem Relation Age of Onset     Cancer Brother      KIDNEY DISEASE Brother      due to XRT     HEART DISEASE Sister      Lung Cancer Father 88     Other Cancer Brother      early 2000's         Current Outpatient Prescriptions   Medication Sig Dispense Refill     atorvastatin (LIPITOR) 10 MG tablet Take 1 tablet (10 mg) by mouth daily 90 tablet 3     carvedilol (COREG) 3.125 MG tablet Take 1 tablet (3.125 mg) by mouth 2 times daily (with meals) 60 tablet 1     Cholecalciferol (VITAMIN D) 2000 units tablet Take 2,000 Units by mouth       mycophenolate (GENERIC EQUIVALENT) 250 MG capsule Take 750 mg by mouth       senna (SENOKOT) 8.6 MG tablet Take 1 tablet by mouth daily 100 tablet 0     sulfamethoxazole-trimethoprim (BACTRIM/SEPTRA) 400-80 MG per tablet Take 1 tablet by mouth       tacrolimus (GENERIC EQUIVALENT) 1 MG capsule Take 2 mg by mouth       valGANciclovir (VALCYTE) 450 MG tablet Take 900 mg by mouth       Allergies   Allergen Reactions     Nsaids      Swelling and Hives         Reviewed and updated as needed this visit by clinical staff  Tobacco  Allergies  Meds  Med Hx  Surg Hx  Fam Hx  Soc Hx      Reviewed and updated as needed this visit by Provider         ROS:  Constitutional, HEENT, cardiovascular, pulmonary, gi and gu systems are negative, except as otherwise noted.    OBJECTIVE:     BP (!) 81/50 (BP Location: Right arm, Patient Position: Sitting, Cuff  Size: Adult Regular)  Pulse 100  Temp 97.8  F (36.6  C) (Oral)  Resp 18  Wt 220 lb (99.8 kg)  SpO2 95%  BMI 28.25 kg/m2  Body mass index is 28.25 kg/(m^2).  GENERAL APPEARANCE: healthy, alert and no distress  EYES: Eyes grossly normal to inspection, PERRL and conjunctivae and sclerae normal  NECK: no adenopathy, no asymmetry, masses, or scars and thyroid normal to palpation  RESP: lungs clear to auscultation - no rales, rhonchi or wheezes  CV: regular rates and rhythm, normal S1 S2, no S3 or S4 and no murmur, click or rub  ABD: well-healed incision to right lower abdomen.   PSYCH: mentation appears normal and affect normal/bright        ASSESSMENT/PLAN:   1.  S/p kidney transplant: doing great.  Following with his specialist.  2.  Hypotension; asymptomatic; he will monitor and f/u if consistent.  He continues on coreg 3.125mg BID.    Sallie Olsen MD  [facility]

## 2018-11-12 NOTE — LETTER
[address] [address]  Phone: [phone]    November 12, 2018        Cesar Villalobos  [address] [place] [address]          To Whom It May Concern:    RE: Cesar Villalobos    Patient was seen and treated today at our clinic.  He is cleared for work without restrictions.      Please contact me for questions or concerns.      Sincerely,            Sallie Olsen MD

## 2018-11-12 NOTE — MR AVS SNAPSHOT
After Visit Summary   11/12/2018    Cesar Villalobos    MRN: [id number]           Patient Information     Date Of Birth          1965        Visit Information        Provider Department      11/12/2018 10:20 AM Sallie Olsen MD [facility]        Today's Diagnoses     S/P kidney transplant           Follow-ups after your visit        Follow-up notes from your care team     Return in about 6 months (around 5/12/2019) for Medication Recheck.      Who to contact     If you have questions or need follow up information about today's clinic visit or your schedule please contact [facility] directly at [phone].  Normal or non-critical lab and imaging results will be communicated to you by MyChart, letter or phone within 4 business days after the clinic has received the results. If you do not hear from us within 7 days, please contact the clinic through [facility] or phone. If you have a critical or abnormal lab result, we will notify you by phone as soon as possible.  Submit refill requests through [facility] or call your pharmacy and they will forward the refill request to us. Please allow 3 business days for your refill to be completed.          Additional Information About Your Visit        MyChart Information     [facility] gives you secure access to your electronic health record. If you see a primary care provider, you can also send messages to your care team and make appointments. If you have questions, please call your primary care clinic.  If you do not have a primary care provider, please call [phone] and they will assist you.        Care EveryWhere ID     This is your Care EveryWhere ID. This could be used by other organizations to access your [facility] medical records  [id number]        Your Vitals Were     Pulse Temperature Respirations Pulse Oximetry BMI (Body Mass Index)       100 97.8  F (36.6  C) (Oral) 18 95% 28.25 kg/m2        Blood Pressure  from Last 3 Encounters:   11/12/18 (!) 81/50   04/05/18 136/89   03/12/18 148/84    Weight from Last 3 Encounters:   11/12/18 220 lb (99.8 kg)   04/05/18 221 lb (100.2 kg)   03/12/18 216 lb 11.2 oz (98.3 kg)              Today, you had the following     No orders found for display         Today's Medication Changes          These changes are accurate as of 11/12/18 11:05 AM.  If you have any questions, ask your nurse or doctor.               Stop taking these medicines if you haven't already. Please contact your care team if you have questions.     calcium acetate 667 MG Caps capsule   Commonly known as:  PHOSLO   Stopped by:  Sallie Olsen MD           CARDIZEM  MG 24 hr capsule   Generic drug:  diltiazem   Stopped by:  Sallie Olsen MD           cinacalcet 30 MG tablet   Commonly known as:  SENSIPAR   Stopped by:  Sallie Olsen MD           DIALYVITE 800 0.8 MG Tabs   Stopped by:  Sallie Olsen MD           Ferric Citrate 1  MG(Fe) tablet   Commonly known as:  AURYXIA   Stopped by:  Sallie Olsen MD           fish oil-omega-3 fatty acids 1000 MG capsule   Stopped by:  Sallie Olsen MD           gentamicin 0.1 % cream   Commonly known as:  GARAMYCIN   Stopped by:  Sallie Olsen MD           Lanthanum Carbonate 1000 MG Chew   Stopped by:  Sallie Olsen MD           sevelamer 800 MG tablet   Commonly known as:  RENVELA   Stopped by:  Sallie Olsen MD           sodium bicarbonate 650 MG tablet   Stopped by:  Sallie Olsen MD                    Primary Care Provider Office Phone # Fax #    Sallie Olsen [phone] [address] [place] [address]        Equal Access to Services     KATIE DEWEY : Brittany Flores, luciano ferreira, qaybta kaalmatino burris, chandrakant carrasco. Formerly [facility] [phone].    ATENCIÓN: Si habla español, tiene a coe disposición servicios gratuitos de  asistencia lingüística. Temi al [phone].    We comply with applicable federal civil rights laws and [place] laws. We do not discriminate on the basis of race, color, national origin, age, disability, sex, sexual orientation, or gender identity.            Thank you!     Thank you for choosing [facility]  for your care. Our goal is always to provide you with excellent care. Hearing back from our patients is one way we can continue to improve our services. Please take a few minutes to complete the written survey that you may receive in the mail after your visit with us. Thank you!             Your Updated Medication List - Protect others around you: Learn how to safely use, store and throw away your medicines at www.disposemymeds.org.          This list is accurate as of 11/12/18 11:05 AM.  Always use your most recent med list.                   Brand Name Dispense Instructions for use Diagnosis    atorvastatin 10 MG tablet    LIPITOR    90 tablet    Take 1 tablet (10 mg) by mouth daily    Mixed hyperlipidemia       carvedilol 3.125 MG tablet    COREG    60 tablet    Take 1 tablet (3.125 mg) by mouth 2 times daily (with meals)        mycophenolate 250 MG capsule    GENERIC EQUIVALENT     Take 750 mg by mouth        senna 8.6 MG tablet    SENOKOT    100 tablet    Take 1 tablet by mouth daily    ESRD on peritoneal dialysis (H)       sulfamethoxazole-trimethoprim 400-80 MG per tablet    BACTRIM/SEPTRA     Take 1 tablet by mouth        tacrolimus 1 MG capsule    GENERIC EQUIVALENT     Take 2 mg by mouth        valGANciclovir 450 MG tablet    VALCYTE     Take 900 mg by mouth        vitamin D 2000 units tablet      Take 2,000 Units by mouth

## 2018-11-13 DIAGNOSIS — D84.9 IMMUNOSUPPRESSED STATUS (H): ICD-10-CM

## 2018-11-13 DIAGNOSIS — Z94.0 STATUS POST KIDNEY TRANSPLANT: ICD-10-CM

## 2018-11-13 DIAGNOSIS — Z94.0 KIDNEY REPLACED BY TRANSPLANT: ICD-10-CM

## 2018-11-13 DIAGNOSIS — Z79.899 NEED FOR PROPHYLACTIC CHEMOTHERAPY: ICD-10-CM

## 2018-11-13 DIAGNOSIS — Z79.899 HIGH RISK MEDICATION USE: ICD-10-CM

## 2018-11-13 LAB
BASOPHILS # BLD AUTO: 0 10E9/L (ref 0–0.2)
BASOPHILS NFR BLD AUTO: 0.3 %
DIFFERENTIAL METHOD BLD: ABNORMAL
EOSINOPHIL # BLD AUTO: 0.1 10E9/L (ref 0–0.7)
EOSINOPHIL NFR BLD AUTO: 2.4 %
ERYTHROCYTE [DISTWIDTH] IN BLOOD BY AUTOMATED COUNT: 14.1 % (ref 10–15)
HCT VFR BLD AUTO: 38.5 % (ref 40–53)
HGB BLD-MCNC: 12.3 G/DL (ref 13.3–17.7)
LYMPHOCYTES # BLD AUTO: 0.1 10E9/L (ref 0.8–5.3)
LYMPHOCYTES NFR BLD AUTO: 2.1 %
MCH RBC QN AUTO: 30.4 PG (ref 26.5–33)
MCHC RBC AUTO-ENTMCNC: 31.9 G/DL (ref 31.5–36.5)
MCV RBC AUTO: 95 FL (ref 78–100)
MONOCYTES # BLD AUTO: 0.3 10E9/L (ref 0–1.3)
MONOCYTES NFR BLD AUTO: 8.8 %
NEUTROPHILS # BLD AUTO: 2.9 10E9/L (ref 1.6–8.3)
NEUTROPHILS NFR BLD AUTO: 86.4 %
PLATELET # BLD AUTO: 246 10E9/L (ref 150–450)
RBC # BLD AUTO: 4.04 10E12/L (ref 4.4–5.9)
WBC # BLD AUTO: 3.3 10E9/L (ref 4–11)

## 2018-11-13 PROCEDURE — 84132 ASSAY OF SERUM POTASSIUM: CPT | Performed by: FAMILY MEDICINE

## 2018-11-13 PROCEDURE — 82947 ASSAY GLUCOSE BLOOD QUANT: CPT | Performed by: FAMILY MEDICINE

## 2018-11-13 PROCEDURE — 36415 COLL VENOUS BLD VENIPUNCTURE: CPT | Performed by: FAMILY MEDICINE

## 2018-11-13 PROCEDURE — 82565 ASSAY OF CREATININE: CPT | Performed by: FAMILY MEDICINE

## 2018-11-13 PROCEDURE — 85025 COMPLETE CBC W/AUTO DIFF WBC: CPT | Performed by: FAMILY MEDICINE

## 2018-11-14 LAB
CREAT SERPL-MCNC: 1.27 MG/DL (ref 0.66–1.25)
GFR SERPL CREATININE-BSD FRML MDRD: 59 ML/MIN/1.7M2
GLUCOSE SERPL-MCNC: 104 MG/DL (ref 70–99)
POTASSIUM SERPL-SCNC: 4.3 MMOL/L (ref 3.4–5.3)

## 2018-11-19 DIAGNOSIS — D84.9 IMMUNOSUPPRESSED STATUS (H): ICD-10-CM

## 2018-11-19 DIAGNOSIS — Z79.899 NEED FOR PROPHYLACTIC CHEMOTHERAPY: ICD-10-CM

## 2018-11-19 DIAGNOSIS — Z94.0 KIDNEY REPLACED BY TRANSPLANT: ICD-10-CM

## 2018-11-19 DIAGNOSIS — Z79.899 HIGH RISK MEDICATION USE: ICD-10-CM

## 2018-11-19 DIAGNOSIS — Z94.0 STATUS POST KIDNEY TRANSPLANT: ICD-10-CM

## 2018-11-19 LAB
BASOPHILS # BLD AUTO: 0 10E9/L (ref 0–0.2)
BASOPHILS NFR BLD AUTO: 0.4 %
CREAT SERPL-MCNC: 1.33 MG/DL (ref 0.66–1.25)
DIFFERENTIAL METHOD BLD: ABNORMAL
EOSINOPHIL # BLD AUTO: 0.1 10E9/L (ref 0–0.7)
EOSINOPHIL NFR BLD AUTO: 5.2 %
ERYTHROCYTE [DISTWIDTH] IN BLOOD BY AUTOMATED COUNT: 14 % (ref 10–15)
GFR SERPL CREATININE-BSD FRML MDRD: 56 ML/MIN/1.7M2
GLUCOSE SERPL-MCNC: 108 MG/DL (ref 70–99)
HCT VFR BLD AUTO: 37.4 % (ref 40–53)
HGB BLD-MCNC: 12.1 G/DL (ref 13.3–17.7)
LYMPHOCYTES # BLD AUTO: 0.1 10E9/L (ref 0.8–5.3)
LYMPHOCYTES NFR BLD AUTO: 2.8 %
MCH RBC QN AUTO: 30.5 PG (ref 26.5–33)
MCHC RBC AUTO-ENTMCNC: 32.4 G/DL (ref 31.5–36.5)
MCV RBC AUTO: 94 FL (ref 78–100)
MONOCYTES # BLD AUTO: 0.2 10E9/L (ref 0–1.3)
MONOCYTES NFR BLD AUTO: 9.3 %
NEUTROPHILS # BLD AUTO: 2 10E9/L (ref 1.6–8.3)
NEUTROPHILS NFR BLD AUTO: 82.3 %
PLATELET # BLD AUTO: 232 10E9/L (ref 150–450)
POTASSIUM SERPL-SCNC: 4.2 MMOL/L (ref 3.4–5.3)
RBC # BLD AUTO: 3.97 10E12/L (ref 4.4–5.9)
WBC # BLD AUTO: 2.5 10E9/L (ref 4–11)

## 2018-11-19 PROCEDURE — 82565 ASSAY OF CREATININE: CPT | Performed by: FAMILY MEDICINE

## 2018-11-19 PROCEDURE — 36415 COLL VENOUS BLD VENIPUNCTURE: CPT | Performed by: FAMILY MEDICINE

## 2018-11-19 PROCEDURE — 82947 ASSAY GLUCOSE BLOOD QUANT: CPT | Performed by: FAMILY MEDICINE

## 2018-11-19 PROCEDURE — 85025 COMPLETE CBC W/AUTO DIFF WBC: CPT | Performed by: FAMILY MEDICINE

## 2018-11-19 PROCEDURE — 84132 ASSAY OF SERUM POTASSIUM: CPT | Performed by: FAMILY MEDICINE

## 2018-11-24 NOTE — MR AVS SNAPSHOT
After Visit Summary   3/12/2018    Cesra Villalobos    MRN: [id number]           Patient Information     Date Of Birth          1965        Visit Information        Provider Department      3/12/2018 1:15 PM Caden Tay MD [facility] Solid Organ Transplant        Today's Diagnoses     Urinary retention    -  1       Follow-ups after your visit        Additional Services     Urology Adult Referral       Specify Unable to void following granados removal.  Granados placed by urology service in the operating room using cystoscope.                  Your next 10 appointments already scheduled     Mar 21, 2018  1:30 PM CDT   (Arrive by 1:15 PM)   Post-Op with RUPA Muñoz   [facility] Solid Organ Transplant ([facility])    [address]  [address]  [facility] [address] [phone]              Who to contact     If you have questions or need follow up information about today's clinic visit or your schedule please contact [facility] SOLID ORGAN TRANSPLANT directly at [phone].  Normal or non-critical lab and imaging results will be communicated to you by [employer], letter or phone within 4 business days after the clinic has received the results. If you do not hear from us within 7 days, please contact the clinic through [employer] or phone. If you have a critical or abnormal lab result, we will notify you by phone as soon as possible.  Submit refill requests through [employer] or call your pharmacy and they will forward the refill request to us. Please allow 3 business days for your refill to be completed.          Additional Information About Your Visit        All[employer] Information     [employer] gives you secure access to your electronic health record. If you see a primary care provider, you can also send messages to your care team and make appointments. If you have questions, please call your primary care clinic.  If you do not have a primary care provider, please  Discussion/Summary  labs from 5/30/2017   Gluc 148 BUN 60 Cr 1.47 Na 129 K 3.1 Cl 86 CO2 30 Ca 108    Cr is down from 1.59 but still up significantly from baseline of 1.1 or so.   Throat is still sore.       Instructed patient to stop chlorthiazide all together and follow daily weight  extra 60Meq of KCL today and then tomorrow go back to 60MeQ BID  weight last 258 and would like to see it go up slowly to 263-264 range  if weight does not go up call doc on call over weekend  concerns of UTI and patient had U/A this am. PCP will check result this afternoon.  Patient or mom to call us later today or tomorrow with update.    Patient should have follow up BMP next week depending on events over next few days.  continue bumex 2mg BID and spironolactone 100mg daily.      Signatures   Electronically signed by : MAXI ASH MD; Jun 1 2017 11:05AM CST     "call [phone] and they will assist you.        Care EveryWhere ID     This is your Care EveryWhere ID. This could be used by other organizations to access your [place] medical records  [id number]        Your Vitals Were     Pulse Temperature Respirations Height Pulse Oximetry BMI (Body Mass Index)    85 98.1  F (36.7  C) (Oral) 16 1.88 m (6' 2\") 96% 27.82 kg/m2       Blood Pressure from Last 3 Encounters:   03/12/18 148/84   03/07/18 116/89   03/06/18 128/74    Weight from Last 3 Encounters:   03/12/18 98.3 kg (216 lb 11.2 oz)   03/07/18 97.4 kg (214 lb 11.7 oz)   03/06/18 95.3 kg (210 lb)              We Performed the Following     Urology Adult Referral          Today's Medication Changes          These changes are accurate as of 3/12/18  4:38 PM.  If you have any questions, ask your nurse or doctor.               These medicines have changed or have updated prescriptions.        Dose/Directions    senna 8.6 MG tablet   Commonly known as:  SENOKOT   This may have changed:    - how much to take  - when to take this  - reasons to take this   Used for:  ESRD on peritoneal dialysis (H)        Dose:  1 tablet   Take 1 tablet by mouth daily   Quantity:  100 tablet   Refills:  0                Primary Care Provider Office Phone # Fax #    Sallie Olsen [phone] [address] [place] [address]        Equal Access to Services     COLLEEN DEWEY AH: Brittany Flores, waaxda lulinn, qaybta kaalmachandrakant harrington. So [facility] [phone].    ATENCIÓN: Si habla español, tiene a coe disposición servicios gratuitos de asistencia lingüística. Llame al [phone].    We comply with applicable federal civil rights laws and [place] laws. We do not discriminate on the basis of race, color, national origin, age, disability, sex, sexual orientation, or gender identity.            Thank you!     Thank you for choosing [facility] SOLID ORGAN " TRANSPLANT  for your care. Our goal is always to provide you with excellent care. Hearing back from our patients is one way we can continue to improve our services. Please take a few minutes to complete the written survey that you may receive in the mail after your visit with us. Thank you!             Your Updated Medication List - Protect others around you: Learn how to safely use, store and throw away your medicines at www.disposemymeds.org.          This list is accurate as of 3/12/18  4:38 PM.  Always use your most recent med list.                   Brand Name Dispense Instructions for use Diagnosis    acetaminophen 325 MG tablet    TYLENOL    100 tablet    Take 2 tablets (650 mg) by mouth every 4 hours as needed for mild pain    ESRD on dialysis (H)       atorvastatin 10 MG tablet    LIPITOR    90 tablet    Take 1 tablet (10 mg) by mouth daily    Mixed hyperlipidemia       calcium acetate 667 MG Caps capsule    PHOSLO    270 capsule    Take 3 capsules (2,001 mg) by mouth 3 times daily (with meals)    Hyperphosphatemia       DIALYVITE 800 0.8 MG Tabs     90 tablet    Take 1 tablet by mouth daily    ESRD (end stage renal disease) on dialysis (H)       DILTIAZEM HCL PO      Take 180 mg by mouth daily        fish oil-omega-3 fatty acids 1000 MG capsule      Take 1 capsule by mouth daily.        gentamicin 0.1 % cream    GARAMYCIN    30 g    Apply topically daily    Peritoneal dialysis catheter dysfunction, subsequent encounter (H)       LISINOPRIL PO      Take 20 mg by mouth        oxyCODONE IR 5 MG tablet    ROXICODONE    18 tablet    Take 1-2 tablets (5-10 mg) by mouth every 6 hours as needed for pain maximum 6 tablet(s) per day    ESRD on dialysis (H)       senna 8.6 MG tablet    SENOKOT    100 tablet    Take 1 tablet by mouth daily    ESRD on peritoneal dialysis (H)       sevelamer 800 MG tablet    RENAGEL    90 tablet    Take 1 tablet (800 mg) by mouth 3 times daily (with meals)    Hyperphosphatemia        sodium bicarbonate 650 MG tablet     360 tablet    Take 2 tablets (1,300 mg) by mouth 2 times daily    Acidosis

## 2018-11-27 DIAGNOSIS — Z94.0 STATUS POST KIDNEY TRANSPLANT: ICD-10-CM

## 2018-11-27 DIAGNOSIS — Z94.0 KIDNEY REPLACED BY TRANSPLANT: ICD-10-CM

## 2018-11-27 DIAGNOSIS — Z79.899 NEED FOR PROPHYLACTIC CHEMOTHERAPY: ICD-10-CM

## 2018-11-27 DIAGNOSIS — D84.9 IMMUNOSUPPRESSED STATUS (H): ICD-10-CM

## 2018-11-27 DIAGNOSIS — Z79.899 HIGH RISK MEDICATION USE: ICD-10-CM

## 2018-11-27 LAB
BASOPHILS # BLD AUTO: 0 10E9/L (ref 0–0.2)
BASOPHILS NFR BLD AUTO: 0.3 %
CREAT SERPL-MCNC: 1.33 MG/DL (ref 0.66–1.25)
DIFFERENTIAL METHOD BLD: ABNORMAL
EOSINOPHIL # BLD AUTO: 0.1 10E9/L (ref 0–0.7)
EOSINOPHIL NFR BLD AUTO: 4.4 %
ERYTHROCYTE [DISTWIDTH] IN BLOOD BY AUTOMATED COUNT: 13.9 % (ref 10–15)
GFR SERPL CREATININE-BSD FRML MDRD: 56 ML/MIN/1.7M2
GLUCOSE SERPL-MCNC: 143 MG/DL (ref 70–99)
HCT VFR BLD AUTO: 38.7 % (ref 40–53)
HGB BLD-MCNC: 12.6 G/DL (ref 13.3–17.7)
LYMPHOCYTES # BLD AUTO: 0.1 10E9/L (ref 0.8–5.3)
LYMPHOCYTES NFR BLD AUTO: 4.7 %
MCH RBC QN AUTO: 30.5 PG (ref 26.5–33)
MCHC RBC AUTO-ENTMCNC: 32.6 G/DL (ref 31.5–36.5)
MCV RBC AUTO: 94 FL (ref 78–100)
MONOCYTES # BLD AUTO: 0.2 10E9/L (ref 0–1.3)
MONOCYTES NFR BLD AUTO: 7.4 %
NEUTROPHILS # BLD AUTO: 2.5 10E9/L (ref 1.6–8.3)
NEUTROPHILS NFR BLD AUTO: 83.2 %
PLATELET # BLD AUTO: 219 10E9/L (ref 150–450)
POTASSIUM SERPL-SCNC: 4.2 MMOL/L (ref 3.4–5.3)
RBC # BLD AUTO: 4.13 10E12/L (ref 4.4–5.9)
WBC # BLD AUTO: 3 10E9/L (ref 4–11)

## 2018-11-27 PROCEDURE — 36415 COLL VENOUS BLD VENIPUNCTURE: CPT

## 2018-11-27 PROCEDURE — 82565 ASSAY OF CREATININE: CPT

## 2018-11-27 PROCEDURE — 82947 ASSAY GLUCOSE BLOOD QUANT: CPT

## 2018-11-27 PROCEDURE — 85025 COMPLETE CBC W/AUTO DIFF WBC: CPT

## 2018-11-27 PROCEDURE — 84132 ASSAY OF SERUM POTASSIUM: CPT

## 2018-12-10 DIAGNOSIS — Z79.899 HIGH RISK MEDICATION USE: ICD-10-CM

## 2018-12-10 DIAGNOSIS — Z94.0 KIDNEY REPLACED BY TRANSPLANT: ICD-10-CM

## 2018-12-10 DIAGNOSIS — Z79.899 NEED FOR PROPHYLACTIC CHEMOTHERAPY: ICD-10-CM

## 2018-12-10 DIAGNOSIS — Z94.0 STATUS POST KIDNEY TRANSPLANT: ICD-10-CM

## 2018-12-10 DIAGNOSIS — D84.9 IMMUNOSUPPRESSED STATUS (H): ICD-10-CM

## 2018-12-10 LAB
BASOPHILS # BLD AUTO: 0 10E9/L (ref 0–0.2)
BASOPHILS NFR BLD AUTO: 2.2 %
CREAT SERPL-MCNC: 1.33 MG/DL (ref 0.66–1.25)
DIFFERENTIAL METHOD BLD: ABNORMAL
EOSINOPHIL # BLD AUTO: 0.1 10E9/L (ref 0–0.7)
EOSINOPHIL NFR BLD AUTO: 6 %
ERYTHROCYTE [DISTWIDTH] IN BLOOD BY AUTOMATED COUNT: 13.9 % (ref 10–15)
GFR SERPL CREATININE-BSD FRML MDRD: 56 ML/MIN/1.7M2
GLUCOSE SERPL-MCNC: 114 MG/DL (ref 70–99)
HCT VFR BLD AUTO: 38.6 % (ref 40–53)
HGB BLD-MCNC: 12.4 G/DL (ref 13.3–17.7)
LYMPHOCYTES # BLD AUTO: 0.2 10E9/L (ref 0.8–5.3)
LYMPHOCYTES NFR BLD AUTO: 8.2 %
MCH RBC QN AUTO: 30.2 PG (ref 26.5–33)
MCHC RBC AUTO-ENTMCNC: 32.1 G/DL (ref 31.5–36.5)
MCV RBC AUTO: 94 FL (ref 78–100)
MONOCYTES # BLD AUTO: 0.2 10E9/L (ref 0–1.3)
MONOCYTES NFR BLD AUTO: 11.5 %
NEUTROPHILS # BLD AUTO: 1.3 10E9/L (ref 1.6–8.3)
NEUTROPHILS NFR BLD AUTO: 72.1 %
PLATELET # BLD AUTO: 217 10E9/L (ref 150–450)
POTASSIUM SERPL-SCNC: 4.1 MMOL/L (ref 3.4–5.3)
RBC # BLD AUTO: 4.1 10E12/L (ref 4.4–5.9)
WBC # BLD AUTO: 1.8 10E9/L (ref 4–11)

## 2018-12-10 PROCEDURE — 82565 ASSAY OF CREATININE: CPT

## 2018-12-10 PROCEDURE — 82947 ASSAY GLUCOSE BLOOD QUANT: CPT

## 2018-12-10 PROCEDURE — 84132 ASSAY OF SERUM POTASSIUM: CPT

## 2018-12-10 PROCEDURE — 85025 COMPLETE CBC W/AUTO DIFF WBC: CPT

## 2018-12-10 PROCEDURE — 36415 COLL VENOUS BLD VENIPUNCTURE: CPT

## 2018-12-14 ENCOUNTER — MEDICAL CORRESPONDENCE (OUTPATIENT)
Dept: HEALTH INFORMATION MANAGEMENT | Facility: CLINIC | Age: 53
End: 2018-12-14

## 2018-12-20 DIAGNOSIS — Z94.0 S/P KIDNEY TRANSPLANT: Primary | ICD-10-CM

## 2018-12-26 DIAGNOSIS — Z94.0 STATUS POST KIDNEY TRANSPLANT: ICD-10-CM

## 2018-12-26 DIAGNOSIS — D84.9 IMMUNOSUPPRESSED STATUS (H): ICD-10-CM

## 2018-12-26 DIAGNOSIS — Z79.899 HIGH RISK MEDICATION USE: ICD-10-CM

## 2018-12-26 DIAGNOSIS — Z79.899 NEED FOR PROPHYLACTIC CHEMOTHERAPY: ICD-10-CM

## 2018-12-26 DIAGNOSIS — Z94.0 KIDNEY REPLACED BY TRANSPLANT: ICD-10-CM

## 2018-12-26 LAB
BASOPHILS # BLD AUTO: 0.1 10E9/L (ref 0–0.2)
BASOPHILS NFR BLD AUTO: 2.5 %
CREAT SERPL-MCNC: 1.11 MG/DL (ref 0.66–1.25)
DIFFERENTIAL METHOD BLD: ABNORMAL
EOSINOPHIL # BLD AUTO: 0.2 10E9/L (ref 0–0.7)
EOSINOPHIL NFR BLD AUTO: 7.6 %
ERYTHROCYTE [DISTWIDTH] IN BLOOD BY AUTOMATED COUNT: 12.9 % (ref 10–15)
GFR SERPL CREATININE-BSD FRML MDRD: 74 ML/MIN/{1.73_M2}
GLUCOSE SERPL-MCNC: 111 MG/DL (ref 70–99)
HCT VFR BLD AUTO: 39.3 % (ref 40–53)
HGB BLD-MCNC: 12.9 G/DL (ref 13.3–17.7)
LYMPHOCYTES # BLD AUTO: 0.2 10E9/L (ref 0.8–5.3)
LYMPHOCYTES NFR BLD AUTO: 10.2 %
MCH RBC QN AUTO: 30.4 PG (ref 26.5–33)
MCHC RBC AUTO-ENTMCNC: 32.8 G/DL (ref 31.5–36.5)
MCV RBC AUTO: 93 FL (ref 78–100)
MONOCYTES # BLD AUTO: 0.2 10E9/L (ref 0–1.3)
MONOCYTES NFR BLD AUTO: 8.6 %
NEUTROPHILS # BLD AUTO: 1.4 10E9/L (ref 1.6–8.3)
NEUTROPHILS NFR BLD AUTO: 71.1 %
PLATELET # BLD AUTO: 194 10E9/L (ref 150–450)
POTASSIUM SERPL-SCNC: 4 MMOL/L (ref 3.4–5.3)
RBC # BLD AUTO: 4.25 10E12/L (ref 4.4–5.9)
WBC # BLD AUTO: 2 10E9/L (ref 4–11)

## 2018-12-26 PROCEDURE — 36415 COLL VENOUS BLD VENIPUNCTURE: CPT | Performed by: FAMILY MEDICINE

## 2018-12-26 PROCEDURE — 84132 ASSAY OF SERUM POTASSIUM: CPT | Performed by: FAMILY MEDICINE

## 2018-12-26 PROCEDURE — 82565 ASSAY OF CREATININE: CPT | Performed by: FAMILY MEDICINE

## 2018-12-26 PROCEDURE — 85025 COMPLETE CBC W/AUTO DIFF WBC: CPT | Performed by: FAMILY MEDICINE

## 2018-12-26 PROCEDURE — 82947 ASSAY GLUCOSE BLOOD QUANT: CPT | Performed by: FAMILY MEDICINE

## 2019-01-07 DIAGNOSIS — Z79.899 NEED FOR PROPHYLACTIC CHEMOTHERAPY: ICD-10-CM

## 2019-01-07 DIAGNOSIS — D84.9 IMMUNOSUPPRESSED STATUS (H): ICD-10-CM

## 2019-01-07 DIAGNOSIS — Z94.0 STATUS POST KIDNEY TRANSPLANT: ICD-10-CM

## 2019-01-07 DIAGNOSIS — Z94.0 KIDNEY REPLACED BY TRANSPLANT: ICD-10-CM

## 2019-01-07 DIAGNOSIS — Z79.899 HIGH RISK MEDICATION USE: ICD-10-CM

## 2019-01-07 LAB
BASOPHILS # BLD AUTO: 0 10E9/L (ref 0–0.2)
BASOPHILS NFR BLD AUTO: 0.8 %
CREAT SERPL-MCNC: 1.32 MG/DL (ref 0.66–1.25)
DIFFERENTIAL METHOD BLD: ABNORMAL
EOSINOPHIL # BLD AUTO: 0.2 10E9/L (ref 0–0.7)
EOSINOPHIL NFR BLD AUTO: 6 %
ERYTHROCYTE [DISTWIDTH] IN BLOOD BY AUTOMATED COUNT: 13.1 % (ref 10–15)
GFR SERPL CREATININE-BSD FRML MDRD: 60 ML/MIN/{1.73_M2}
GLUCOSE SERPL-MCNC: 112 MG/DL (ref 70–99)
HCT VFR BLD AUTO: 42.2 % (ref 40–53)
HGB BLD-MCNC: 13.5 G/DL (ref 13.3–17.7)
LYMPHOCYTES # BLD AUTO: 0.3 10E9/L (ref 0.8–5.3)
LYMPHOCYTES NFR BLD AUTO: 12 %
MCH RBC QN AUTO: 29.7 PG (ref 26.5–33)
MCHC RBC AUTO-ENTMCNC: 32 G/DL (ref 31.5–36.5)
MCV RBC AUTO: 93 FL (ref 78–100)
MONOCYTES # BLD AUTO: 0.3 10E9/L (ref 0–1.3)
MONOCYTES NFR BLD AUTO: 12 %
NEUTROPHILS # BLD AUTO: 1.7 10E9/L (ref 1.6–8.3)
NEUTROPHILS NFR BLD AUTO: 69.2 %
PLATELET # BLD AUTO: 200 10E9/L (ref 150–450)
POTASSIUM SERPL-SCNC: 4.1 MMOL/L (ref 3.4–5.3)
RBC # BLD AUTO: 4.55 10E12/L (ref 4.4–5.9)
WBC # BLD AUTO: 2.5 10E9/L (ref 4–11)

## 2019-01-07 PROCEDURE — 85025 COMPLETE CBC W/AUTO DIFF WBC: CPT

## 2019-01-07 PROCEDURE — 84132 ASSAY OF SERUM POTASSIUM: CPT

## 2019-01-07 PROCEDURE — 82947 ASSAY GLUCOSE BLOOD QUANT: CPT

## 2019-01-07 PROCEDURE — 36415 COLL VENOUS BLD VENIPUNCTURE: CPT

## 2019-01-07 PROCEDURE — 82565 ASSAY OF CREATININE: CPT

## 2019-02-05 ENCOUNTER — MEDICAL CORRESPONDENCE (OUTPATIENT)
Dept: HEALTH INFORMATION MANAGEMENT | Facility: CLINIC | Age: 54
End: 2019-02-05

## 2019-02-18 DIAGNOSIS — D84.9 IMMUNOSUPPRESSED STATUS (H): ICD-10-CM

## 2019-02-18 DIAGNOSIS — Z79.899 HIGH RISK MEDICATION USE: ICD-10-CM

## 2019-02-18 DIAGNOSIS — Z79.899 NEED FOR PROPHYLACTIC CHEMOTHERAPY: ICD-10-CM

## 2019-02-18 DIAGNOSIS — Z94.0 KIDNEY REPLACED BY TRANSPLANT: ICD-10-CM

## 2019-02-18 DIAGNOSIS — Z94.0 STATUS POST KIDNEY TRANSPLANT: ICD-10-CM

## 2019-02-18 LAB
BASOPHILS # BLD AUTO: 0 10E9/L (ref 0–0.2)
BASOPHILS NFR BLD AUTO: 0.4 %
CREAT SERPL-MCNC: 1.31 MG/DL (ref 0.66–1.25)
DIFFERENTIAL METHOD BLD: ABNORMAL
EOSINOPHIL # BLD AUTO: 0.2 10E9/L (ref 0–0.7)
EOSINOPHIL NFR BLD AUTO: 6.8 %
ERYTHROCYTE [DISTWIDTH] IN BLOOD BY AUTOMATED COUNT: 12.2 % (ref 10–15)
GFR SERPL CREATININE-BSD FRML MDRD: 61 ML/MIN/{1.73_M2}
GLUCOSE SERPL-MCNC: 114 MG/DL (ref 70–99)
HCT VFR BLD AUTO: 41.6 % (ref 40–53)
HGB BLD-MCNC: 13.5 G/DL (ref 13.3–17.7)
LYMPHOCYTES # BLD AUTO: 0.5 10E9/L (ref 0.8–5.3)
LYMPHOCYTES NFR BLD AUTO: 18.4 %
MCH RBC QN AUTO: 29.3 PG (ref 26.5–33)
MCHC RBC AUTO-ENTMCNC: 32.5 G/DL (ref 31.5–36.5)
MCV RBC AUTO: 90 FL (ref 78–100)
MONOCYTES # BLD AUTO: 0.3 10E9/L (ref 0–1.3)
MONOCYTES NFR BLD AUTO: 12 %
NEUTROPHILS # BLD AUTO: 1.7 10E9/L (ref 1.6–8.3)
NEUTROPHILS NFR BLD AUTO: 62.4 %
PLATELET # BLD AUTO: 172 10E9/L (ref 150–450)
POTASSIUM SERPL-SCNC: 4 MMOL/L (ref 3.4–5.3)
RBC # BLD AUTO: 4.61 10E12/L (ref 4.4–5.9)
WBC # BLD AUTO: 2.7 10E9/L (ref 4–11)

## 2019-02-18 PROCEDURE — 82947 ASSAY GLUCOSE BLOOD QUANT: CPT | Performed by: FAMILY MEDICINE

## 2019-02-18 PROCEDURE — 85025 COMPLETE CBC W/AUTO DIFF WBC: CPT | Performed by: FAMILY MEDICINE

## 2019-02-18 PROCEDURE — 82565 ASSAY OF CREATININE: CPT | Performed by: FAMILY MEDICINE

## 2019-02-18 PROCEDURE — 36415 COLL VENOUS BLD VENIPUNCTURE: CPT | Performed by: FAMILY MEDICINE

## 2019-02-18 PROCEDURE — 84132 ASSAY OF SERUM POTASSIUM: CPT | Performed by: FAMILY MEDICINE

## 2019-03-04 DIAGNOSIS — Z79.899 HIGH RISK MEDICATION USE: ICD-10-CM

## 2019-03-04 DIAGNOSIS — Z94.0 KIDNEY REPLACED BY TRANSPLANT: ICD-10-CM

## 2019-03-04 DIAGNOSIS — D84.9 IMMUNOSUPPRESSED STATUS (H): ICD-10-CM

## 2019-03-04 DIAGNOSIS — Z94.0 STATUS POST KIDNEY TRANSPLANT: ICD-10-CM

## 2019-03-04 DIAGNOSIS — Z79.899 NEED FOR PROPHYLACTIC CHEMOTHERAPY: ICD-10-CM

## 2019-03-04 LAB
CREAT SERPL-MCNC: 1.28 MG/DL (ref 0.66–1.25)
DIFFERENTIAL METHOD BLD: ABNORMAL
EOSINOPHIL # BLD AUTO: 0.2 10E9/L (ref 0–0.7)
EOSINOPHIL NFR BLD AUTO: 19 %
ERYTHROCYTE [DISTWIDTH] IN BLOOD BY AUTOMATED COUNT: 12.1 % (ref 10–15)
GFR SERPL CREATININE-BSD FRML MDRD: 62 ML/MIN/{1.73_M2}
GLUCOSE SERPL-MCNC: 125 MG/DL (ref 70–99)
HCT VFR BLD AUTO: 41.9 % (ref 40–53)
HGB BLD-MCNC: 13.6 G/DL (ref 13.3–17.7)
LYMPHOCYTES # BLD AUTO: 0.4 10E9/L (ref 0.8–5.3)
LYMPHOCYTES NFR BLD AUTO: 35 %
MCH RBC QN AUTO: 28.9 PG (ref 26.5–33)
MCHC RBC AUTO-ENTMCNC: 32.5 G/DL (ref 31.5–36.5)
MCV RBC AUTO: 89 FL (ref 78–100)
MONOCYTES # BLD AUTO: 0.2 10E9/L (ref 0–1.3)
MONOCYTES NFR BLD AUTO: 14 %
NEUTROPHILS # BLD AUTO: 0.3 10E9/L (ref 1.6–8.3)
NEUTROPHILS NFR BLD AUTO: 32 %
PLATELET # BLD AUTO: 198 10E9/L (ref 150–450)
PLATELET # BLD EST: ABNORMAL 10*3/UL
POTASSIUM SERPL-SCNC: 4.4 MMOL/L (ref 3.4–5.3)
RBC # BLD AUTO: 4.71 10E12/L (ref 4.4–5.9)
RBC MORPH BLD: NORMAL
WBC # BLD AUTO: 1.1 10E9/L (ref 4–11)

## 2019-03-04 PROCEDURE — 82947 ASSAY GLUCOSE BLOOD QUANT: CPT

## 2019-03-04 PROCEDURE — 84132 ASSAY OF SERUM POTASSIUM: CPT

## 2019-03-04 PROCEDURE — 36415 COLL VENOUS BLD VENIPUNCTURE: CPT

## 2019-03-04 PROCEDURE — 85025 COMPLETE CBC W/AUTO DIFF WBC: CPT

## 2019-03-04 PROCEDURE — 82565 ASSAY OF CREATININE: CPT

## 2019-03-06 ENCOUNTER — TELEPHONE (OUTPATIENT)
Dept: FAMILY MEDICINE | Facility: CLINIC | Age: 54
End: 2019-03-06

## 2019-03-06 NOTE — TELEPHONE ENCOUNTER
This is not a medication that we have at our clinic.  I am fine with ordering it, but I don't know where he goes to get it.  Would the nephrology office know?

## 2019-03-06 NOTE — TELEPHONE ENCOUNTER
"  Dr. Olsen - please review   This will be discussed with [place] RN PCS as well, we are unable to give this medication from an order outside provider     S-(situation): phone call from [facility] RN Care Coordinator Lesli WESTFALL-(background): patient is kidney transplant patient and needs an injection of neupogen     A-(assessment): patient's white count 1.1     R-(recommendations): advised Lesli FELICIANO Care Coordinator that it is a [facility] Policy that we do not administer injections from orders sent in from other providers     Lesli PEREIRA states - \"you are going to make this patient drive 2 hours to get this injection\" - writer advised would check with the patient care supervisor but to this writers understanding this is the policy     Lesli PEREIRA responded to writer saying \"if you are going to check with your supervisor just to call me back and say you cant do it then forget it - its a waste of time\"     Lesli PEREIRA responded \"you are going to tell me you dont have a provider in your clinic that can order this for the patient\"     Writer advised care coordinator that this will be discussed with the patient care supervisor and return call after discussing     Taz Brink Nurse   [facility]       "

## 2019-03-06 NOTE — TELEPHONE ENCOUNTER
Spoke with our pharmacy, the [facility]   Pt and renetta  Pt had no idea he had a wbc of 1.1  Discussed precautionary measures.  Pt agreed to have his own girlfriend do his injection(after learning it is only subQ) and renetta is going to call the order into a local [facility].   Pt in agreement with this plan   They will call if we can help in any way

## 2019-03-11 DIAGNOSIS — Z94.0 KIDNEY REPLACED BY TRANSPLANT: ICD-10-CM

## 2019-03-11 DIAGNOSIS — Z79.899 HIGH RISK MEDICATION USE: ICD-10-CM

## 2019-03-11 DIAGNOSIS — D84.9 IMMUNOSUPPRESSED STATUS (H): ICD-10-CM

## 2019-03-11 DIAGNOSIS — Z94.0 STATUS POST KIDNEY TRANSPLANT: ICD-10-CM

## 2019-03-11 DIAGNOSIS — Z79.899 NEED FOR PROPHYLACTIC CHEMOTHERAPY: ICD-10-CM

## 2019-03-11 LAB
BASOPHILS # BLD AUTO: 0 10E9/L (ref 0–0.2)
BASOPHILS NFR BLD AUTO: 1.2 %
DIFFERENTIAL METHOD BLD: ABNORMAL
EOSINOPHIL # BLD AUTO: 0.1 10E9/L (ref 0–0.7)
EOSINOPHIL NFR BLD AUTO: 8.5 %
ERYTHROCYTE [DISTWIDTH] IN BLOOD BY AUTOMATED COUNT: 12.3 % (ref 10–15)
HCT VFR BLD AUTO: 40.5 % (ref 40–53)
HGB BLD-MCNC: 13.1 G/DL (ref 13.3–17.7)
LYMPHOCYTES # BLD AUTO: 0.7 10E9/L (ref 0.8–5.3)
LYMPHOCYTES NFR BLD AUTO: 40.2 %
MCH RBC QN AUTO: 28.7 PG (ref 26.5–33)
MCHC RBC AUTO-ENTMCNC: 32.3 G/DL (ref 31.5–36.5)
MCV RBC AUTO: 89 FL (ref 78–100)
MONOCYTES # BLD AUTO: 0.2 10E9/L (ref 0–1.3)
MONOCYTES NFR BLD AUTO: 14.6 %
NEUTROPHILS # BLD AUTO: 0.6 10E9/L (ref 1.6–8.3)
NEUTROPHILS NFR BLD AUTO: 35.5 %
PLATELET # BLD AUTO: 201 10E9/L (ref 150–450)
POTASSIUM SERPL-SCNC: 3.9 MMOL/L (ref 3.4–5.3)
RBC # BLD AUTO: 4.57 10E12/L (ref 4.4–5.9)
WBC # BLD AUTO: 1.6 10E9/L (ref 4–11)

## 2019-03-11 PROCEDURE — 36415 COLL VENOUS BLD VENIPUNCTURE: CPT

## 2019-03-11 PROCEDURE — 82947 ASSAY GLUCOSE BLOOD QUANT: CPT

## 2019-03-11 PROCEDURE — 85025 COMPLETE CBC W/AUTO DIFF WBC: CPT

## 2019-03-11 PROCEDURE — 82565 ASSAY OF CREATININE: CPT

## 2019-03-11 PROCEDURE — 84132 ASSAY OF SERUM POTASSIUM: CPT

## 2019-03-12 LAB
CREAT SERPL-MCNC: 1.27 MG/DL (ref 0.66–1.25)
GFR SERPL CREATININE-BSD FRML MDRD: 63 ML/MIN/{1.73_M2}
GLUCOSE SERPL-MCNC: 116 MG/DL (ref 70–99)

## 2019-03-27 DIAGNOSIS — Z94.0 STATUS POST KIDNEY TRANSPLANT: ICD-10-CM

## 2019-03-27 DIAGNOSIS — Z79.899 HIGH RISK MEDICATION USE: ICD-10-CM

## 2019-03-27 DIAGNOSIS — D84.9 IMMUNOSUPPRESSED STATUS (H): ICD-10-CM

## 2019-03-27 LAB
BASOPHILS # BLD AUTO: 0 10E9/L (ref 0–0.2)
BASOPHILS NFR BLD AUTO: 0.6 %
DIFFERENTIAL METHOD BLD: ABNORMAL
EOSINOPHIL # BLD AUTO: 0.1 10E9/L (ref 0–0.7)
EOSINOPHIL NFR BLD AUTO: 2.8 %
ERYTHROCYTE [DISTWIDTH] IN BLOOD BY AUTOMATED COUNT: 12.9 % (ref 10–15)
HCT VFR BLD AUTO: 44.2 % (ref 40–53)
HGB BLD-MCNC: 14.1 G/DL (ref 13.3–17.7)
LYMPHOCYTES # BLD AUTO: 1.4 10E9/L (ref 0.8–5.3)
LYMPHOCYTES NFR BLD AUTO: 40.4 %
MCH RBC QN AUTO: 28.7 PG (ref 26.5–33)
MCHC RBC AUTO-ENTMCNC: 31.9 G/DL (ref 31.5–36.5)
MCV RBC AUTO: 90 FL (ref 78–100)
MONOCYTES # BLD AUTO: 0.5 10E9/L (ref 0–1.3)
MONOCYTES NFR BLD AUTO: 14.3 %
NEUTROPHILS # BLD AUTO: 1.5 10E9/L (ref 1.6–8.3)
NEUTROPHILS NFR BLD AUTO: 41.9 %
PLATELET # BLD AUTO: 187 10E9/L (ref 150–450)
RBC # BLD AUTO: 4.92 10E12/L (ref 4.4–5.9)
WBC # BLD AUTO: 3.6 10E9/L (ref 4–11)

## 2019-03-27 PROCEDURE — 36415 COLL VENOUS BLD VENIPUNCTURE: CPT

## 2019-03-27 PROCEDURE — 85025 COMPLETE CBC W/AUTO DIFF WBC: CPT

## 2019-04-23 DIAGNOSIS — Z79.899 HIGH RISK MEDICATION USE: ICD-10-CM

## 2019-04-23 DIAGNOSIS — Z94.0 STATUS POST KIDNEY TRANSPLANT: ICD-10-CM

## 2019-04-23 DIAGNOSIS — D84.9 IMMUNOSUPPRESSED STATUS (H): ICD-10-CM

## 2019-04-23 LAB
BASOPHILS # BLD AUTO: 0 10E9/L (ref 0–0.2)
BASOPHILS NFR BLD AUTO: 0.2 %
DIFFERENTIAL METHOD BLD: NORMAL
EOSINOPHIL # BLD AUTO: 0.2 10E9/L (ref 0–0.7)
EOSINOPHIL NFR BLD AUTO: 2.8 %
ERYTHROCYTE [DISTWIDTH] IN BLOOD BY AUTOMATED COUNT: 13.2 % (ref 10–15)
HCT VFR BLD AUTO: 43.4 % (ref 40–53)
HGB BLD-MCNC: 14 G/DL (ref 13.3–17.7)
LYMPHOCYTES # BLD AUTO: 1.7 10E9/L (ref 0.8–5.3)
LYMPHOCYTES NFR BLD AUTO: 31.2 %
MCH RBC QN AUTO: 28.8 PG (ref 26.5–33)
MCHC RBC AUTO-ENTMCNC: 32.3 G/DL (ref 31.5–36.5)
MCV RBC AUTO: 89 FL (ref 78–100)
MONOCYTES # BLD AUTO: 0.6 10E9/L (ref 0–1.3)
MONOCYTES NFR BLD AUTO: 10.6 %
NEUTROPHILS # BLD AUTO: 3 10E9/L (ref 1.6–8.3)
NEUTROPHILS NFR BLD AUTO: 55.2 %
PLATELET # BLD AUTO: 152 10E9/L (ref 150–450)
RBC # BLD AUTO: 4.86 10E12/L (ref 4.4–5.9)
WBC # BLD AUTO: 5.5 10E9/L (ref 4–11)

## 2019-04-23 PROCEDURE — 36415 COLL VENOUS BLD VENIPUNCTURE: CPT

## 2019-04-23 PROCEDURE — 85025 COMPLETE CBC W/AUTO DIFF WBC: CPT

## 2019-05-20 DIAGNOSIS — D84.9 IMMUNOSUPPRESSED STATUS (H): ICD-10-CM

## 2019-05-20 DIAGNOSIS — Z79.899 NEED FOR PROPHYLACTIC CHEMOTHERAPY: ICD-10-CM

## 2019-05-20 DIAGNOSIS — Z94.0 STATUS POST KIDNEY TRANSPLANT: ICD-10-CM

## 2019-05-20 DIAGNOSIS — Z94.0 KIDNEY REPLACED BY TRANSPLANT: ICD-10-CM

## 2019-05-20 DIAGNOSIS — Z79.899 HIGH RISK MEDICATION USE: ICD-10-CM

## 2019-05-20 LAB
BASOPHILS # BLD AUTO: 0 10E9/L (ref 0–0.2)
BASOPHILS NFR BLD AUTO: 0.2 %
CREAT SERPL-MCNC: 1.18 MG/DL (ref 0.66–1.25)
DIFFERENTIAL METHOD BLD: NORMAL
EOSINOPHIL # BLD AUTO: 0.1 10E9/L (ref 0–0.7)
EOSINOPHIL NFR BLD AUTO: 2.3 %
ERYTHROCYTE [DISTWIDTH] IN BLOOD BY AUTOMATED COUNT: 14.1 % (ref 10–15)
GFR SERPL CREATININE-BSD FRML MDRD: 69 ML/MIN/{1.73_M2}
GLUCOSE SERPL-MCNC: 105 MG/DL (ref 70–99)
HCT VFR BLD AUTO: 44.4 % (ref 40–53)
HGB BLD-MCNC: 14.5 G/DL (ref 13.3–17.7)
LYMPHOCYTES # BLD AUTO: 1.2 10E9/L (ref 0.8–5.3)
LYMPHOCYTES NFR BLD AUTO: 23.6 %
MCH RBC QN AUTO: 29.1 PG (ref 26.5–33)
MCHC RBC AUTO-ENTMCNC: 32.7 G/DL (ref 31.5–36.5)
MCV RBC AUTO: 89 FL (ref 78–100)
MONOCYTES # BLD AUTO: 0.5 10E9/L (ref 0–1.3)
MONOCYTES NFR BLD AUTO: 10.2 %
NEUTROPHILS # BLD AUTO: 3.3 10E9/L (ref 1.6–8.3)
NEUTROPHILS NFR BLD AUTO: 63.7 %
PLATELET # BLD AUTO: 180 10E9/L (ref 150–450)
POTASSIUM SERPL-SCNC: 3.6 MMOL/L (ref 3.4–5.3)
RBC # BLD AUTO: 4.98 10E12/L (ref 4.4–5.9)
WBC # BLD AUTO: 5.1 10E9/L (ref 4–11)

## 2019-05-20 PROCEDURE — 82947 ASSAY GLUCOSE BLOOD QUANT: CPT | Performed by: FAMILY MEDICINE

## 2019-05-20 PROCEDURE — 36415 COLL VENOUS BLD VENIPUNCTURE: CPT | Performed by: FAMILY MEDICINE

## 2019-05-20 PROCEDURE — 84132 ASSAY OF SERUM POTASSIUM: CPT | Performed by: FAMILY MEDICINE

## 2019-05-20 PROCEDURE — 82565 ASSAY OF CREATININE: CPT | Performed by: FAMILY MEDICINE

## 2019-05-20 PROCEDURE — 85025 COMPLETE CBC W/AUTO DIFF WBC: CPT | Performed by: FAMILY MEDICINE

## 2019-06-07 ENCOUNTER — OFFICE VISIT (OUTPATIENT)
Dept: URGENT CARE | Facility: URGENT CARE | Age: 54
End: 2019-06-07
Payer: COMMERCIAL

## 2019-06-07 VITALS
TEMPERATURE: 98.4 F | SYSTOLIC BLOOD PRESSURE: 118 MMHG | DIASTOLIC BLOOD PRESSURE: 88 MMHG | HEIGHT: 74 IN | RESPIRATION RATE: 16 BRPM | WEIGHT: 220 LBS | HEART RATE: 60 BPM | BODY MASS INDEX: 28.23 KG/M2

## 2019-06-07 DIAGNOSIS — R30.0 DYSURIA: Primary | ICD-10-CM

## 2019-06-07 LAB
ALBUMIN UR-MCNC: NEGATIVE MG/DL
APPEARANCE UR: CLEAR
BACTERIA #/AREA URNS HPF: ABNORMAL /HPF
BILIRUB UR QL STRIP: NEGATIVE
COLOR UR AUTO: YELLOW
GLUCOSE UR STRIP-MCNC: 250 MG/DL
HGB UR QL STRIP: ABNORMAL
KETONES UR STRIP-MCNC: NEGATIVE MG/DL
LEUKOCYTE ESTERASE UR QL STRIP: NEGATIVE
NITRATE UR QL: NEGATIVE
NON-SQ EPI CELLS #/AREA URNS LPF: ABNORMAL /LPF
PH UR STRIP: 5 PH (ref 5–7)
RBC #/AREA URNS AUTO: ABNORMAL /HPF
SOURCE: ABNORMAL
SP GR UR STRIP: <=1.005 (ref 1–1.03)
UROBILINOGEN UR STRIP-ACNC: 0.2 EU/DL (ref 0.2–1)
WBC #/AREA URNS AUTO: ABNORMAL /HPF

## 2019-06-07 PROCEDURE — 81001 URINALYSIS AUTO W/SCOPE: CPT | Performed by: INTERNAL MEDICINE

## 2019-06-07 PROCEDURE — 99213 OFFICE O/P EST LOW 20 MIN: CPT | Performed by: FAMILY MEDICINE

## 2019-06-07 ASSESSMENT — MIFFLIN-ST. JEOR: SCORE: 1902.66

## 2019-06-07 NOTE — PROGRESS NOTES
SUBJECTIVE:   Chief Complaint   Patient presents with     Urgent Care     UTI     some tingling in penis, started Wednesday, was probably dehydrated and urine was dark that day. Urine is clear now and is trying to drink a lot but had kidney transplant 6 months ago so making sure.      Genitourinary - Male  Onset: Two days ago    Description:   Dysuria (painful urination): Yes  Hematuria (blood in urine):no  Frequency: no  How many times are you getting up to urinate at night? : No  Hesitancy (delay in starting urine):no  Retention (unable to empty): no  Decrease in urinary flow: no  Incontinence: no    Progression of Symptoms:  same    Accompanying Signs & Symptoms:  Fever: no  Back/Flank pain:no  Urethral discharge:no  Nausea and/or vomiting: no  Abdominal pain:no    History:   History of frequent UTI's: no  He has a history of kidney transplant on immunosuppressants        Review of Systems review of system negative except as mentioned in HPI.       Past Medical History:   Diagnosis Date     Anemia in chronic kidney disease      Dyslipidemia      End stage renal disease on dialysis (H)      Hypertension      Family History   Problem Relation Age of Onset     Cancer Brother      Kidney Disease Brother         due to XRT     Heart Disease Sister      Lung Cancer Father 88     Other Cancer Brother         early 2000's     Current Outpatient Medications   Medication Sig Dispense Refill     atorvastatin (LIPITOR) 10 MG tablet Take 1 tablet (10 mg) by mouth daily 90 tablet 3     carvedilol (COREG) 3.125 MG tablet Take 1 tablet (3.125 mg) by mouth 2 times daily (with meals) 60 tablet 1     mycophenolate (GENERIC EQUIVALENT) 250 MG capsule Take 750 mg by mouth       tacrolimus (GENERIC EQUIVALENT) 1 MG capsule Take 2 mg by mouth       pentamidine (NEBUPENT) 300 MG neb solution Inhale 300 mg into the lungs every 28 days (Patient not taking: Reported on 6/7/2019) 300 mg 10     senna (SENOKOT) 8.6 MG tablet Take 1 tablet by  "mouth daily 100 tablet 0     valGANciclovir (VALCYTE) 450 MG tablet Take 900 mg by mouth       Social History     Tobacco Use     Smoking status: Never Smoker     Smokeless tobacco: Never Used   Substance Use Topics     Alcohol use: Yes     Alcohol/week: 0.0 oz     Comment: barely, 1-2 drinks a month       OBJECTIVE  /88   Pulse 60   Temp 98.4  F (36.9  C) (Oral)   Resp 16   Ht 1.88 m (6' 2\")   Wt 99.8 kg (220 lb)   BMI 28.25 kg/m      Physical Exam   Constitutional: He appears well-developed and well-nourished. No distress.   HENT:   Head: Normocephalic and atraumatic.   Eyes: Conjunctivae are normal.   Neurological: He is alert.   Skin: Skin is warm. He is not diaphoretic.   Psychiatric: He has a normal mood and affect. His behavior is normal.       Labs:  No results found for this or any previous visit (from the past 24 hour(s)).    X-Ray was not done.    ASSESSMENT:    Cesar was seen today for urgent care and uti.    Diagnoses and all orders for this visit:    Dysuria:  Patient presented to the clinic for concerns of urinary tract infection. His urinalysis was unremarkable for findings consistent with UTI. He was reassured and watchful observation recommended. He will follow up as needed.   -     *UA reflex to Microscopic and Culture ([facility] (except [place] and [place])      Followup:    If not improving or if condition worsens, follow up with your Primary Care Provider    Laurence Rush MD    "

## 2019-07-08 DIAGNOSIS — Z94.0 STATUS POST KIDNEY TRANSPLANT: ICD-10-CM

## 2019-07-08 DIAGNOSIS — D84.9 IMMUNOSUPPRESSED STATUS (H): ICD-10-CM

## 2019-07-08 DIAGNOSIS — Z79.899 HIGH RISK MEDICATION USE: ICD-10-CM

## 2019-07-08 LAB
BASOPHILS # BLD AUTO: 0 10E9/L (ref 0–0.2)
BASOPHILS NFR BLD AUTO: 0.2 %
DIFFERENTIAL METHOD BLD: NORMAL
EOSINOPHIL # BLD AUTO: 0.1 10E9/L (ref 0–0.7)
EOSINOPHIL NFR BLD AUTO: 2.2 %
ERYTHROCYTE [DISTWIDTH] IN BLOOD BY AUTOMATED COUNT: 13.6 % (ref 10–15)
HCT VFR BLD AUTO: 43.4 % (ref 40–53)
HGB BLD-MCNC: 14.1 G/DL (ref 13.3–17.7)
LYMPHOCYTES # BLD AUTO: 1.4 10E9/L (ref 0.8–5.3)
LYMPHOCYTES NFR BLD AUTO: 23.9 %
MCH RBC QN AUTO: 29.5 PG (ref 26.5–33)
MCHC RBC AUTO-ENTMCNC: 32.5 G/DL (ref 31.5–36.5)
MCV RBC AUTO: 91 FL (ref 78–100)
MONOCYTES # BLD AUTO: 0.7 10E9/L (ref 0–1.3)
MONOCYTES NFR BLD AUTO: 11.6 %
NEUTROPHILS # BLD AUTO: 3.7 10E9/L (ref 1.6–8.3)
NEUTROPHILS NFR BLD AUTO: 62.1 %
PLATELET # BLD AUTO: 191 10E9/L (ref 150–450)
RBC # BLD AUTO: 4.78 10E12/L (ref 4.4–5.9)
WBC # BLD AUTO: 5.9 10E9/L (ref 4–11)

## 2019-07-08 PROCEDURE — 36415 COLL VENOUS BLD VENIPUNCTURE: CPT

## 2019-07-08 PROCEDURE — 85025 COMPLETE CBC W/AUTO DIFF WBC: CPT

## 2019-07-08 PROCEDURE — 99000 SPECIMEN HANDLING OFFICE-LAB: CPT

## 2019-07-12 DIAGNOSIS — Z79.899 NEED FOR PROPHYLACTIC CHEMOTHERAPY: ICD-10-CM

## 2019-07-12 DIAGNOSIS — Z94.0 KIDNEY REPLACED BY TRANSPLANT: ICD-10-CM

## 2019-07-12 LAB
CREAT SERPL-MCNC: 1.23 MG/DL (ref 0.66–1.25)
GFR SERPL CREATININE-BSD FRML MDRD: 65 ML/MIN/{1.73_M2}
GLUCOSE SERPL-MCNC: 111 MG/DL (ref 70–99)
POTASSIUM SERPL-SCNC: 3.7 MMOL/L (ref 3.4–5.3)

## 2019-07-12 PROCEDURE — 82565 ASSAY OF CREATININE: CPT

## 2019-07-12 PROCEDURE — 82947 ASSAY GLUCOSE BLOOD QUANT: CPT | Performed by: FAMILY MEDICINE

## 2019-07-12 PROCEDURE — 84132 ASSAY OF SERUM POTASSIUM: CPT | Performed by: FAMILY MEDICINE

## 2019-07-12 PROCEDURE — 36415 COLL VENOUS BLD VENIPUNCTURE: CPT

## 2019-08-28 DIAGNOSIS — D84.9 IMMUNOSUPPRESSED STATUS (H): ICD-10-CM

## 2019-08-28 DIAGNOSIS — Z94.0 KIDNEY REPLACED BY TRANSPLANT: ICD-10-CM

## 2019-08-28 DIAGNOSIS — Z94.0 STATUS POST KIDNEY TRANSPLANT: ICD-10-CM

## 2019-08-28 DIAGNOSIS — Z79.899 HIGH RISK MEDICATION USE: ICD-10-CM

## 2019-08-28 DIAGNOSIS — Z79.899 NEED FOR PROPHYLACTIC CHEMOTHERAPY: ICD-10-CM

## 2019-08-28 LAB
BASOPHILS # BLD AUTO: 0 10E9/L (ref 0–0.2)
BASOPHILS NFR BLD AUTO: 0.2 %
CREAT SERPL-MCNC: 1.36 MG/DL (ref 0.66–1.25)
DIFFERENTIAL METHOD BLD: NORMAL
EOSINOPHIL # BLD AUTO: 0.1 10E9/L (ref 0–0.7)
EOSINOPHIL NFR BLD AUTO: 2.2 %
ERYTHROCYTE [DISTWIDTH] IN BLOOD BY AUTOMATED COUNT: 13.3 % (ref 10–15)
GFR SERPL CREATININE-BSD FRML MDRD: 58 ML/MIN/{1.73_M2}
GLUCOSE SERPL-MCNC: 111 MG/DL (ref 70–99)
HCT VFR BLD AUTO: 44.1 % (ref 40–53)
HGB BLD-MCNC: 14.4 G/DL (ref 13.3–17.7)
LYMPHOCYTES # BLD AUTO: 1 10E9/L (ref 0.8–5.3)
LYMPHOCYTES NFR BLD AUTO: 21.2 %
MCH RBC QN AUTO: 30.1 PG (ref 26.5–33)
MCHC RBC AUTO-ENTMCNC: 32.7 G/DL (ref 31.5–36.5)
MCV RBC AUTO: 92 FL (ref 78–100)
MONOCYTES # BLD AUTO: 0.5 10E9/L (ref 0–1.3)
MONOCYTES NFR BLD AUTO: 11.4 %
NEUTROPHILS # BLD AUTO: 3 10E9/L (ref 1.6–8.3)
NEUTROPHILS NFR BLD AUTO: 65 %
PLATELET # BLD AUTO: 171 10E9/L (ref 150–450)
POTASSIUM SERPL-SCNC: 3.8 MMOL/L (ref 3.4–5.3)
RBC # BLD AUTO: 4.79 10E12/L (ref 4.4–5.9)
WBC # BLD AUTO: 4.6 10E9/L (ref 4–11)

## 2019-08-28 PROCEDURE — 36415 COLL VENOUS BLD VENIPUNCTURE: CPT

## 2019-08-28 PROCEDURE — 85025 COMPLETE CBC W/AUTO DIFF WBC: CPT

## 2019-08-28 PROCEDURE — 84132 ASSAY OF SERUM POTASSIUM: CPT

## 2019-08-28 PROCEDURE — 82565 ASSAY OF CREATININE: CPT

## 2019-08-28 PROCEDURE — 82947 ASSAY GLUCOSE BLOOD QUANT: CPT

## 2019-09-24 DIAGNOSIS — Z79.899 HIGH RISK MEDICATION USE: ICD-10-CM

## 2019-09-24 DIAGNOSIS — Z94.0 KIDNEY REPLACED BY TRANSPLANT: ICD-10-CM

## 2019-09-24 DIAGNOSIS — Z94.0 STATUS POST KIDNEY TRANSPLANT: ICD-10-CM

## 2019-09-24 DIAGNOSIS — D84.9 IMMUNOSUPPRESSED STATUS (H): ICD-10-CM

## 2019-09-24 DIAGNOSIS — Z79.899 NEED FOR PROPHYLACTIC CHEMOTHERAPY: ICD-10-CM

## 2019-09-24 LAB
BASOPHILS # BLD AUTO: 0 10E9/L (ref 0–0.2)
BASOPHILS NFR BLD AUTO: 0.2 %
CREAT SERPL-MCNC: 1.08 MG/DL (ref 0.66–1.25)
DIFFERENTIAL METHOD BLD: NORMAL
EOSINOPHIL # BLD AUTO: 0.1 10E9/L (ref 0–0.7)
EOSINOPHIL NFR BLD AUTO: 2.1 %
ERYTHROCYTE [DISTWIDTH] IN BLOOD BY AUTOMATED COUNT: 13 % (ref 10–15)
GFR SERPL CREATININE-BSD FRML MDRD: 76 ML/MIN/{1.73_M2}
GLUCOSE SERPL-MCNC: 105 MG/DL (ref 70–99)
HCT VFR BLD AUTO: 45.6 % (ref 40–53)
HGB BLD-MCNC: 15.2 G/DL (ref 13.3–17.7)
LYMPHOCYTES # BLD AUTO: 1.1 10E9/L (ref 0.8–5.3)
LYMPHOCYTES NFR BLD AUTO: 20.5 %
MCH RBC QN AUTO: 30.2 PG (ref 26.5–33)
MCHC RBC AUTO-ENTMCNC: 33.3 G/DL (ref 31.5–36.5)
MCV RBC AUTO: 91 FL (ref 78–100)
MONOCYTES # BLD AUTO: 0.6 10E9/L (ref 0–1.3)
MONOCYTES NFR BLD AUTO: 11.4 %
NEUTROPHILS # BLD AUTO: 3.4 10E9/L (ref 1.6–8.3)
NEUTROPHILS NFR BLD AUTO: 65.8 %
PLATELET # BLD AUTO: 169 10E9/L (ref 150–450)
POTASSIUM SERPL-SCNC: 3.5 MMOL/L (ref 3.4–5.3)
RBC # BLD AUTO: 5.03 10E12/L (ref 4.4–5.9)
WBC # BLD AUTO: 5.2 10E9/L (ref 4–11)

## 2019-09-24 PROCEDURE — 82565 ASSAY OF CREATININE: CPT

## 2019-09-24 PROCEDURE — 82947 ASSAY GLUCOSE BLOOD QUANT: CPT

## 2019-09-24 PROCEDURE — 99000 SPECIMEN HANDLING OFFICE-LAB: CPT

## 2019-09-24 PROCEDURE — 85025 COMPLETE CBC W/AUTO DIFF WBC: CPT

## 2019-09-24 PROCEDURE — 84132 ASSAY OF SERUM POTASSIUM: CPT

## 2019-09-24 PROCEDURE — 36415 COLL VENOUS BLD VENIPUNCTURE: CPT

## 2020-03-01 ENCOUNTER — HEALTH MAINTENANCE LETTER (OUTPATIENT)
Age: 55
End: 2020-03-01

## 2020-05-18 NOTE — MR AVS SNAPSHOT
After Visit Summary   9/21/2017    Cesar Villalobos    MRN: [id number]           Patient Information     Date Of Birth          1965        Visit Information        Provider Department      9/21/2017 11:00 AM Mel Radford, JUDD [facility] Solid Organ Transplant        Today's Diagnoses     Organ transplant candidate    -  1       Follow-ups after your visit        Who to contact     If you have questions or need follow up information about today's clinic visit or your schedule please contact [facility] SOLID ORGAN TRANSPLANT directly at [phone].  Normal or non-critical lab and imaging results will be communicated to you by [employer], letter or phone within 4 business days after the clinic has received the results. If you do not hear from us within 7 days, please contact the clinic through [employer] or phone. If you have a critical or abnormal lab result, we will notify you by phone as soon as possible.  Submit refill requests through [employer] or call your pharmacy and they will forward the refill request to us. Please allow 3 business days for your refill to be completed.          Additional Information About Your Visit        MyChart Information     [employer] gives you secure access to your electronic health record. If you see a primary care provider, you can also send messages to your care team and make appointments. If you have questions, please call your primary care clinic.  If you do not have a primary care provider, please call [phone] and they will assist you.        Care EveryWhere ID     This is your Care EveryWhere ID. This could be used by other organizations to access your [place] medical records  [id number]         Blood Pressure from Last 3 Encounters:   06/09/17 117/69   06/07/17 149/88   06/07/17 (!) 137/95    Weight from Last 3 Encounters:   06/09/17 97.5 kg (215 lb)   06/07/17 97.5 kg (215 lb)   06/06/17 97.5 kg (215 lb)              Today, you had the following     No  cysto  removal foreign body orders found for display       Primary Care Provider Office Phone # Fax #    Sallie Olsen [phone] [address] [place]  [place] [address]        Equal Access to Services     COLLEEN DEWEY : Brittany aj sherie Flores, waaxda luqadaha, qaybta kaalmada adehardikyada, chandrakant finleyjerry parkerhardik echeverria jared carrasco. So [facility] [phone].    ATENCIÓN: Si habla español, tiene a coe disposición servicios gratuitos de asistencia lingüística. Llame al [phone].    We comply with applicable federal civil rights laws and [place] laws. We do not discriminate on the basis of race, color, national origin, age, disability sex, sexual orientation or gender identity.            Thank you!     Thank you for choosing [facility] SOLID ORGAN TRANSPLANT  for your care. Our goal is always to provide you with excellent care. Hearing back from our patients is one way we can continue to improve our services. Please take a few minutes to complete the written survey that you may receive in the mail after your visit with us. Thank you!             Your Updated Medication List - Protect others around you: Learn how to safely use, store and throw away your medicines at www.disposemymeds.org.          This list is accurate as of: 9/21/17 12:04 PM.  Always use your most recent med list.                   Brand Name Dispense Instructions for use Diagnosis    atorvastatin 10 MG tablet    LIPITOR    90 tablet    Take 1 tablet (10 mg) by mouth daily    CKD (chronic kidney disease) stage 5, GFR less than 15 ml/min (H)       calcium carbonate 500 MG chewable tablet    TUMS     Take 1 chew tab by mouth 3 times daily Dose is 750mg    CKD (chronic kidney disease) stage 5, GFR less than 15 ml/min (H)       DILT- MG 24 hr capsule   Generic drug:  diltiazem     90 capsule    TAKE 1 CAPSULE BY MOUTH EVERY DAY    HTN (hypertension)       fish oil-omega-3 fatty acids 1000 MG capsule      Take 1 capsule by mouth daily.         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

## 2020-05-23 DIAGNOSIS — D84.9 IMMUNODEFICIENCY (H): ICD-10-CM

## 2020-05-23 DIAGNOSIS — Z94.0 TRANSPLANTED KIDNEY: Primary | ICD-10-CM

## 2020-05-23 DIAGNOSIS — Z79.899 ENCOUNTER FOR LONG-TERM (CURRENT) USE OF OTHER MEDICATIONS: ICD-10-CM

## 2020-06-02 ENCOUNTER — TELEPHONE (OUTPATIENT)
Dept: FAMILY MEDICINE | Facility: CLINIC | Age: 55
End: 2020-06-02

## 2020-10-26 ENCOUNTER — OFFICE VISIT (OUTPATIENT)
Dept: FAMILY MEDICINE | Facility: CLINIC | Age: 55
End: 2020-10-26
Payer: COMMERCIAL

## 2020-10-26 VITALS
WEIGHT: 241 LBS | RESPIRATION RATE: 12 BRPM | TEMPERATURE: 98.7 F | SYSTOLIC BLOOD PRESSURE: 128 MMHG | DIASTOLIC BLOOD PRESSURE: 84 MMHG | HEART RATE: 70 BPM | BODY MASS INDEX: 30.93 KG/M2 | HEIGHT: 74 IN

## 2020-10-26 DIAGNOSIS — D84.9 IMMUNODEFICIENCY (H): ICD-10-CM

## 2020-10-26 DIAGNOSIS — Z23 NEED FOR PROPHYLACTIC VACCINATION AND INOCULATION AGAINST INFLUENZA: ICD-10-CM

## 2020-10-26 DIAGNOSIS — Z79.899 ENCOUNTER FOR LONG-TERM (CURRENT) USE OF OTHER MEDICATIONS: ICD-10-CM

## 2020-10-26 DIAGNOSIS — Z13.220 LIPID SCREENING: ICD-10-CM

## 2020-10-26 DIAGNOSIS — Z94.0 TRANSPLANTED KIDNEY: ICD-10-CM

## 2020-10-26 DIAGNOSIS — Z00.00 ROUTINE GENERAL MEDICAL EXAMINATION AT A HEALTH CARE FACILITY: Primary | ICD-10-CM

## 2020-10-26 LAB
BASOPHILS # BLD AUTO: 0 10E9/L (ref 0–0.2)
BASOPHILS NFR BLD AUTO: 0.2 %
CHOLEST SERPL-MCNC: 191 MG/DL
CREAT SERPL-MCNC: 1.16 MG/DL (ref 0.66–1.25)
CREAT UR-MCNC: 34 MG/DL
DIFFERENTIAL METHOD BLD: NORMAL
EOSINOPHIL # BLD AUTO: 0.1 10E9/L (ref 0–0.7)
EOSINOPHIL NFR BLD AUTO: 2 %
ERYTHROCYTE [DISTWIDTH] IN BLOOD BY AUTOMATED COUNT: 13 % (ref 10–15)
GFR SERPL CREATININE-BSD FRML MDRD: 69 ML/MIN/{1.73_M2}
GLUCOSE SERPL-MCNC: 129 MG/DL (ref 70–99)
HBA1C MFR BLD: 6.5 % (ref 0–5.6)
HCT VFR BLD AUTO: 46.9 % (ref 40–53)
HDLC SERPL-MCNC: 56 MG/DL
HGB BLD-MCNC: 15.3 G/DL (ref 13.3–17.7)
LDLC SERPL CALC-MCNC: 98 MG/DL
LYMPHOCYTES # BLD AUTO: 1.4 10E9/L (ref 0.8–5.3)
LYMPHOCYTES NFR BLD AUTO: 21 %
MCH RBC QN AUTO: 29.4 PG (ref 26.5–33)
MCHC RBC AUTO-ENTMCNC: 32.6 G/DL (ref 31.5–36.5)
MCV RBC AUTO: 90 FL (ref 78–100)
MICROALBUMIN UR-MCNC: 12 MG/L
MICROALBUMIN/CREAT UR: 33.62 MG/G CR (ref 0–17)
MONOCYTES # BLD AUTO: 0.5 10E9/L (ref 0–1.3)
MONOCYTES NFR BLD AUTO: 8.2 %
NEUTROPHILS # BLD AUTO: 4.5 10E9/L (ref 1.6–8.3)
NEUTROPHILS NFR BLD AUTO: 68.6 %
NONHDLC SERPL-MCNC: 135 MG/DL
PLATELET # BLD AUTO: 191 10E9/L (ref 150–450)
POTASSIUM SERPL-SCNC: 4.2 MMOL/L (ref 3.4–5.3)
RBC # BLD AUTO: 5.2 10E12/L (ref 4.4–5.9)
TRIGL SERPL-MCNC: 185 MG/DL
WBC # BLD AUTO: 6.5 10E9/L (ref 4–11)

## 2020-10-26 PROCEDURE — 85025 COMPLETE CBC W/AUTO DIFF WBC: CPT | Performed by: FAMILY MEDICINE

## 2020-10-26 PROCEDURE — 80061 LIPID PANEL: CPT | Performed by: FAMILY MEDICINE

## 2020-10-26 PROCEDURE — 36415 COLL VENOUS BLD VENIPUNCTURE: CPT | Performed by: FAMILY MEDICINE

## 2020-10-26 PROCEDURE — 82947 ASSAY GLUCOSE BLOOD QUANT: CPT | Performed by: FAMILY MEDICINE

## 2020-10-26 PROCEDURE — 90750 HZV VACC RECOMBINANT IM: CPT | Performed by: FAMILY MEDICINE

## 2020-10-26 PROCEDURE — 84132 ASSAY OF SERUM POTASSIUM: CPT | Performed by: FAMILY MEDICINE

## 2020-10-26 PROCEDURE — 83036 HEMOGLOBIN GLYCOSYLATED A1C: CPT | Performed by: FAMILY MEDICINE

## 2020-10-26 PROCEDURE — 82043 UR ALBUMIN QUANTITATIVE: CPT | Performed by: NURSE PRACTITIONER

## 2020-10-26 PROCEDURE — 90472 IMMUNIZATION ADMIN EACH ADD: CPT | Performed by: FAMILY MEDICINE

## 2020-10-26 PROCEDURE — 82565 ASSAY OF CREATININE: CPT | Performed by: FAMILY MEDICINE

## 2020-10-26 PROCEDURE — 90471 IMMUNIZATION ADMIN: CPT | Performed by: FAMILY MEDICINE

## 2020-10-26 PROCEDURE — 90682 RIV4 VACC RECOMBINANT DNA IM: CPT | Performed by: FAMILY MEDICINE

## 2020-10-26 PROCEDURE — 99396 PREV VISIT EST AGE 40-64: CPT | Mod: 25 | Performed by: FAMILY MEDICINE

## 2020-10-26 RX ORDER — PREDNISONE 10 MG/1
10 TABLET ORAL DAILY
COMMUNITY

## 2020-10-26 ASSESSMENT — ENCOUNTER SYMPTOMS
DYSURIA: 0
MYALGIAS: 0
SORE THROAT: 0
CONSTIPATION: 0
CHILLS: 0
HEMATURIA: 0
FEVER: 0
NAUSEA: 0
SHORTNESS OF BREATH: 0
HEMATOCHEZIA: 0
HEARTBURN: 1
COUGH: 0
JOINT SWELLING: 0
PARESTHESIAS: 0
WEAKNESS: 0
NERVOUS/ANXIOUS: 0
DIARRHEA: 0
DIZZINESS: 0
HEADACHES: 0
PALPITATIONS: 0
ABDOMINAL PAIN: 0
FREQUENCY: 0
ARTHRALGIAS: 0
EYE PAIN: 0

## 2020-10-26 ASSESSMENT — MIFFLIN-ST. JEOR: SCORE: 1987.92

## 2020-10-26 ASSESSMENT — ACTIVITIES OF DAILY LIVING (ADL): CURRENT_FUNCTION: NO ASSISTANCE NEEDED

## 2020-10-26 NOTE — PROGRESS NOTES
SUBJECTIVE:   CC: Cesar Villalobos is an 55 year old male who presents for preventative health visit.       Patient has been advised of split billing requirements and indicates understanding: Yes  Healthy Habits:     Frequency of exercise:  1 day/week    Duration of exercise:  Less than 15 minutes    Taking medications regularly:  Yes    PHQ-2 Total Score: 0    Additional concerns today:  No      Today's PHQ-2 Score:   PHQ-2 ( 1999 Pfizer) 10/26/2020   Q1: Little interest or pleasure in doing things 0   Q2: Feeling down, depressed or hopeless 0   PHQ-2 Score 0   Q1: Little interest or pleasure in doing things Not at all   Q2: Feeling down, depressed or hopeless Not at all   PHQ-2 Score 0       Abuse: Current or Past(Physical, Sexual or Emotional)- No  Do you feel safe in your environment? Yes    Have you ever done Advance Care Planning? (For example, a Health Directive, POLST, or a discussion with a medical provider or your loved ones about your wishes): Yes, patient states has an Advance Care Planning document and will bring a copy to the clinic.    Social History     Tobacco Use     Smoking status: Never Smoker     Smokeless tobacco: Never Used   Substance Use Topics     Alcohol use: Yes     Alcohol/week: 0.0 standard drinks     Comment: barely, 1-2 drinks a month         Alcohol Use 10/26/2020   Prescreen: >3 drinks/day or >7 drinks/week? Yes   AUDIT SCORE  3       Last PSA:   PSA   Date Value Ref Range Status   09/21/2017 1.52 0 - 4 ug/L Final     Comment:     Assay Method:  Chemiluminescence using Siemens Vista analyzer       Reviewed orders with patient. Reviewed health maintenance and updated orders accordingly - Yes  Labs reviewed in EPIC    Reviewed and updated as needed this visit by clinical staff                 Reviewed and updated as needed this visit by Provider                    Review of Systems   Constitutional: Negative for chills and fever.   HENT: Negative for congestion, ear pain, hearing loss  "and sore throat.    Eyes: Positive for visual disturbance. Negative for pain.   Respiratory: Negative for cough and shortness of breath.    Cardiovascular: Negative for chest pain, palpitations and peripheral edema.   Gastrointestinal: Positive for heartburn. Negative for abdominal pain, constipation, diarrhea, hematochezia and nausea.   Genitourinary: Negative for discharge, dysuria, frequency, genital sores, hematuria, impotence and urgency.   Musculoskeletal: Negative for arthralgias, joint swelling and myalgias.   Skin: Negative for rash.   Neurological: Negative for dizziness, weakness, headaches and paresthesias.   Psychiatric/Behavioral: Negative for mood changes. The patient is not nervous/anxious.    heartburn - not bothersome and will try lifestyle changes    OBJECTIVE:   /84   Pulse 70   Temp 98.7  F (37.1  C) (Oral)   Resp 12   Ht 1.88 m (6' 2\")   Wt 109.3 kg (241 lb)   BMI 30.94 kg/m    Physical Exam  GENERAL: healthy, alert and no distress  EYES: Eyes grossly normal to inspection, conjunctivae and sclerae normal  HENT: ear canals  normal  NECK: no adenopathy, no asymmetry, masses, or scars and thyroid normal to palpation  RESP: lungs clear to auscultation - no rales, rhonchi or wheezes  CV: regular rate and rhythm, normal S1 S2  ABDOMEN: soft, nontender, no hepatosplenomegaly, no masses and bowel sounds normal  MS: no gross musculoskeletal defects noted, no edema  SKIN: no suspicious lesions or rashes  NEURO: Normal strength and tone, mentation intact and speech normal  PSYCH: mentation appears normal, affect normal    Diagnostic Test Results:  Labs reviewed in Epic    ASSESSMENT/PLAN:     1. Routine general medical examination at a health care facility  - EA ADD'L VACCINE    2. Transplanted kidney  - Hemoglobin A1c  - Albumin Random Urine Quantitative with Creat Ratio  - Glucose  - Potassium  - Creatinine  - CBC with platelets and differential    3. Immunodeficiency (H)  - Hemoglobin " "A1c  - Albumin Random Urine Quantitative with Creat Ratio  - Glucose  - Potassium  - Creatinine  - CBC with platelets and differential    4. Encounter for long-term (current) use of other medications  - Hemoglobin A1c  - Albumin Random Urine Quantitative with Creat Ratio  - Glucose  - Potassium  - Creatinine  - CBC with platelets and differential    5. Lipid screening  - Lipid Profile (Chol, Trig, HDL, LDL calc)    6. Need for prophylactic vaccination and inoculation against influenza  - INFLUENZA QUAD, RECOMBINANT, P-FREE (RIV4) (FLUBLOCK) [64289]  - Vaccine Administration, Initial [47401]      Patient has been advised of split billing requirements and indicates understanding: Yes  COUNSELING:   Reviewed preventive health counseling, as reflected in patient instructions    Estimated body mass index is 28.25 kg/m  as calculated from the following:    Height as of 6/7/19: 1.88 m (6' 2\").    Weight as of 6/7/19: 99.8 kg (220 lb).     Weight management plan: Discussed healthy diet and exercise guidelines    He reports that he has never smoked. He has never used smokeless tobacco.      Counseling Resources:  ATP IV Guidelines  Pooled Cohorts Equation Calculator  FRAX Risk Assessment  ICSI Preventive Guidelines  Dietary Guidelines for Americans, 2010  USDA's MyPlate  ASA Prophylaxis  Lung CA Screening    Derashauna Markell Marcus MD  [facility]  "

## 2020-10-28 DIAGNOSIS — Z79.899 ENCOUNTER FOR LONG-TERM (CURRENT) USE OF OTHER MEDICATIONS: ICD-10-CM

## 2020-10-28 DIAGNOSIS — Z94.0 TRANSPLANTED KIDNEY: ICD-10-CM

## 2020-10-28 DIAGNOSIS — D84.9 IMMUNODEFICIENCY (H): ICD-10-CM

## 2020-10-28 PROCEDURE — 99000 SPECIMEN HANDLING OFFICE-LAB: CPT

## 2021-02-09 NOTE — PROGRESS NOTES
Patient here for Pentamidine treatment ordered by Dr. Sallie Olsen.  1.25 mg of Xopenex given via neb prior to 300 mg of Pentamidine in 6 ml of sterile water via filtered neb. Patient tolerated both well without complication.  Pulse: 98, 98, 96    Heart Rate: 86, 80, 86   Breath sounds clear bilaterally before and after treatments.  Lot number for Nebupent:  [id number].  Expires 03/20.   Sterile water lot number: [id number],  Expires 4/1/2019.   ASHLYN Coleman     Progress Note Sandra Carrel 1960, 56 y o  male MRN: [id number]    [facility] Encounter: [id number]    Primary Care Provider: VERONICA Hanna   Date and time admitted to hospital: 1/13/2021  2:28 PM        * Pneumonia due to COVID-19 virus  Assessment & Plan  · Tested positive for covid 12/31/2020  · S/p completion of covid tx  · S/p completion of IV abx for possible superimposed bacterial pna  · Resp status stable on 2 L NC  · Considered recovered; off isolation    Fever  Assessment & Plan  Patient had a fever of 101 8 last night  None this morning  But later in the afternoon again had low-grade 100 8  Otherwise asymptomatic  Per nursing patient has been intermittently coughing  Will obtain a chest x-ray  Continue monitor closely for now off antibiotics  If spikes high-grade fever again, will need full infectious workup including blood cultures  Ischemia of right lower extremity with suspected rhabdomyolysis  Assessment & Plan  · Suspected embolic from aortic thrombus  · S/p urgent R AKA on 01/14  · Continue therapeutic Lovenox   · Currently stable from a vascular surgery standpoint    Right femoral vein DVT (HCC)  Assessment & Plan  Continue Lovenox    Oropharyngeal dysphagia  Assessment & Plan  · Extubated 1/25 and required NGT for nutrition   · Speech therapy following   · S/p barium swallow  · S/p swallow re-eval upgraded to level 2  NGT has since been removed  · Continue robinul  · S/p ENT consult, no vocal cord motion impairment    Aortic thrombus (HCC)  Assessment & Plan  · With embolic event that compromised right femoral artery  S/p urgent R guillotine AKA 1/14    S/p R AKA formalization 1/18   · Continue therapeutic lovenox  · F/u with vascular surgery as outpatient    Type 2 diabetes mellitus, with long-term current use of insulin [facility])  Assessment & Plan  Lab Results   Component Value Date    HGBA1C 11 6 (H) 12/09/2020       Recent Labs     02/08/21  1964 21  0659 21  1114 21  1612   POCGLU 162* 188* 135 125       Blood Sugar Average: Last 72 hrs:  (P) 150 6     · Continue Lantus 10 units with 3 units Humalog t i d  With meals  Continue sliding scale  Methadone maintenance therapy patient [facility])  Assessment & Plan  Chronic methadone use    VTE Pharmacologic Prophylaxis:   Pharmacologic: Enoxaparin (Lovenox)  Mechanical VTE Prophylaxis in Place: Yes    Patient Centered Rounds: I have performed bedside rounds with nursing staff today  Discussions with Specialists or Other Care Team Provider:     Education and Discussions with Family / Patient: pt  Tried calling nephew unable to reach him  Time Spent for Care: 30 minutes  More than 50% of total time spent on counseling and coordination of care as described above  Current Length of Stay: 27 day(s)    Current Patient Status: Inpatient   Certification Statement: The patient will continue to require additional inpatient hospital stay due to above    Discharge Plan:     Code Status: Level 1 - Full Code      Subjective:   Pt seen and examined by me this morning  Pt denied any complaints specifically  Objective:     Vitals:   Temp (24hrs), Av °F (37 8 °C), Min:98 1 °F (36 7 °C), Max:101 1 °F (38 4 °C)    Temp:  [98 1 °F (36 7 °C)-101 1 °F (38 4 °C)] 100 8 °F (38 2 °C)  HR:  [91-96] 95  Resp:  [16-20] 16  BP: ()/(70-73) 110/72  SpO2:  [91 %-95 %] 95 %  Body mass index is 26 22 kg/m²  Input and Output Summary (last 24 hours): Intake/Output Summary (Last 24 hours) at 2021 1822  Last data filed at 2021 1300  Gross per 24 hour   Intake 240 ml   Output 250 ml   Net -10 ml       Physical Exam:     Physical Exam    Constitutional: Pt appears comfortable  Not in any acute distress  Cardiovascular: Normal rate, regular rhythm, normal heart sounds  No murmur heard  Pulmonary/Chest: Effort normal, air entry b/l equal  No respiratory distress   Pt has no wheezes or crackles  Abdominal: Soft  Non-distended, Non-tender  Bowel sounds are normal    Right AKA stump- healing well  Neurological: awake, alert  Moving all extremities spontaneously  Psychiatric: normal mood and affect  Additional Data:     Labs:    Results from last 7 days   Lab Units 02/09/21  1031 02/07/21  0544   WBC Thousand/uL 8 33 7 83   HEMOGLOBIN g/dL 8 6* 8 4*   HEMATOCRIT % 28 1* 28 4*   PLATELETS Thousands/uL 196 232   NEUTROS PCT %  --  74   LYMPHS PCT %  --  13*   MONOS PCT %  --  11   EOS PCT %  --  1     Results from last 7 days   Lab Units 02/09/21  1031   SODIUM mmol/L 139   POTASSIUM mmol/L 3 6   CHLORIDE mmol/L 101   CO2 mmol/L 36*   BUN mg/dL 14   CREATININE mg/dL 0 46*   ANION GAP mmol/L 2*   CALCIUM mg/dL 8 5   GLUCOSE RANDOM mg/dL 116         Results from last 7 days   Lab Units 02/09/21  1612 02/09/21  1114 02/09/21  0659 02/08/21  2131 02/08/21  1619 02/08/21  1040 02/08/21  0624 02/07/21  2103 02/07/21  1622 02/07/21  1149 02/07/21  0628 02/06/21  2107   POC GLUCOSE mg/dl 125 135 188* 162* 175* 142* 131 165* 152* 142* 118 135                   * I Have Reviewed All Lab Data Listed Above  * Additional Pertinent Lab Tests Reviewed:  Jaime 64 Admission Reviewed    Imaging:    Imaging Reports Reviewed Today Include:   Imaging Personally Reviewed by Myself Includes:     Recent Cultures (last 7 days):           Last 24 Hours Medication List:   Current Facility-Administered Medications   Medication Dose Route Frequency Provider Last Rate    acetaminophen  650 mg Oral Q6H PRN Shannan A Sedora, CRNP      albuterol  2 puff Inhalation Q4H PRN Emma Deal, DO      bisacodyl  10 mg Rectal Daily PRN Brianna Sox, CRNP      cholecalciferol  2,000 Units Oral Daily Shannan A Sedora, CRNP      Diclofenac Sodium  4 g Topical 4x Daily Shannan A Sedora, CRNP      enoxaparin  1 mg/kg Subcutaneous Q12H [facility] Shannan A Sedora, CRNP      furosemide  40 mg Oral Daily Eliezer Schroeder DO      gabapentin  400 mg Oral TID Shannan Hopkins, VERONICA      glycopyrrolate  1 mg Oral TID Shannan Hopkins, VERONICA      insulin glargine  10 Units Subcutaneous HS Gabino Hdz MD      insulin lispro  1-5 Units Subcutaneous TID AC Gabino Hdz MD      insulin lispro  3 Units Subcutaneous TID With Meals Gabino Hdz MD      Lidocaine Viscous HCl  15 mL Swish & Spit 4x Daily PRN Shannan Hopkins, VERONICA      lisinopril  10 mg Oral Daily Shannan A Sandeep, CRNP      menthol-methyl salicylate   Apply externally 4x Daily PRN Alec Hill, VERONICA      methadone  70 mg Oral Daily Eliezer Schroeder DO      SAMREEN ANTIFUNGAL  1 application Topical TID Sonia Mccall DO      multivitamin-minerals  1 tablet Oral Daily Shannan A Sandeep, VERONICA      omeprazole (PRILOSEC) suspension 2 mg/mL  20 mg Oral Early Morning Shannan Hopkins, VERONICA      oxyCODONE  2 5 mg Oral Q4H PRN Magaly MACIAS Held, PA-C      oxyCODONE  5 mg Oral Q4H PRN Magaly COLLEEN Held, PA-C      phenol  1 spray Mouth/Throat Q2H PRN Shannan Hopkins, VERONICA      polyethylene glycol  17 g Oral Daily Shannan A Rayoora, VERONICA      senna  1 tablet Oral BID Shannan A Sedora, CRNP      sodium chloride  1 spray Each Nare Q1H PRN Reyna Martin DO          Today, Patient Was Seen By: Mariano Osborn MD    ** Please Note: Dictation voice to text software may have been used in the creation of this document   ** Stable H/H. Elevated LFTs likely from alcohol abuse. No signs of withdrawal. Counseled about stopping drinking and going to rehab but patient refused. ECG NSR. Folate/Vit B12 results pending. Will dc with GYN follow up within 1 week. Pt is well appearing walking with steady gait, stable for discharge and follow up without fail with medical doctor. I had a detailed discussion with the patient and/or guardian regarding the historical points, exam findings, and any diagnostic results supporting the discharge diagnosis. Pt educated on care and need for follow up. Strict return instructions and red flag signs and symptoms discussed with patient. Questions answered. Pt shows understanding of discharge information and agrees to follow.

## 2021-03-02 ENCOUNTER — MYC MEDICAL ADVICE (OUTPATIENT)
Dept: FAMILY MEDICINE | Facility: CLINIC | Age: 56
End: 2021-03-02

## 2021-03-02 DIAGNOSIS — I15.1 HYPERTENSION SECONDARY TO OTHER RENAL DISORDERS: Primary | ICD-10-CM

## 2021-03-03 RX ORDER — CARVEDILOL 3.12 MG/1
3.12 TABLET ORAL 2 TIMES DAILY WITH MEALS
Qty: 60 TABLET | Refills: 0 | Status: SHIPPED | OUTPATIENT
Start: 2021-03-03 | End: 2021-03-31

## 2021-03-03 NOTE — TELEPHONE ENCOUNTER
Writer responded via [employer].  RAMONA MilesN, RN  [facility]

## 2021-03-03 NOTE — TELEPHONE ENCOUNTER
HP Providers-Please review and sign if agree:  1. Patient recently established care with Dr. Marcus (10/26/20) and is in need of Carvedilol refill.  Kidney transplant in 2018   A. Medication most recently refilled within [facility] by Dr. Olsen on 11/12/18 and patient is taking the same dose as in 2018    BP Readings from Last 3 Encounters:   10/26/20 128/84   06/07/19 118/88   11/12/18 (!) 81/50     Pulse on 10/26/20: 70    Thank you!  KELVIN Barrientos BSN, RN  [facility]

## 2021-10-02 ENCOUNTER — HEALTH MAINTENANCE LETTER (OUTPATIENT)
Age: 56
End: 2021-10-02

## 2021-10-14 DIAGNOSIS — Z79.899 CHEMOPROPHYLAXIS: ICD-10-CM

## 2021-10-14 DIAGNOSIS — D84.9 IMMUNODEFICIENCY (H): ICD-10-CM

## 2021-10-14 DIAGNOSIS — Z79.899 ENCOUNTER FOR LONG-TERM (CURRENT) USE OF MEDICATIONS: ICD-10-CM

## 2021-10-14 DIAGNOSIS — Z94.0 KIDNEY REPLACED BY TRANSPLANT: Primary | ICD-10-CM

## 2021-11-27 ENCOUNTER — HEALTH MAINTENANCE LETTER (OUTPATIENT)
Age: 56
End: 2021-11-27

## 2022-06-17 ENCOUNTER — LAB (OUTPATIENT)
Dept: LAB | Facility: CLINIC | Age: 57
End: 2022-06-17
Payer: COMMERCIAL

## 2022-06-17 DIAGNOSIS — Z79.899 ENCOUNTER FOR LONG-TERM (CURRENT) USE OF MEDICATIONS: ICD-10-CM

## 2022-06-17 DIAGNOSIS — D84.9 IMMUNODEFICIENCY (H): ICD-10-CM

## 2022-06-17 DIAGNOSIS — Z94.0 KIDNEY REPLACED BY TRANSPLANT: Primary | ICD-10-CM

## 2022-06-17 LAB
BASOPHILS # BLD AUTO: 0 10E3/UL (ref 0–0.2)
BASOPHILS NFR BLD AUTO: 0 %
CREAT SERPL-MCNC: 1.25 MG/DL (ref 0.66–1.25)
EOSINOPHIL # BLD AUTO: 0.1 10E3/UL (ref 0–0.7)
EOSINOPHIL NFR BLD AUTO: 2 %
ERYTHROCYTE [DISTWIDTH] IN BLOOD BY AUTOMATED COUNT: 12.8 % (ref 10–15)
FASTING STATUS PATIENT QL REPORTED: YES
GFR SERPL CREATININE-BSD FRML MDRD: 66 ML/MIN/1.73M2
GLUCOSE BLD-MCNC: 146 MG/DL (ref 70–99)
HCT VFR BLD AUTO: 42.6 % (ref 40–53)
HGB BLD-MCNC: 14.4 G/DL (ref 13.3–17.7)
IMM GRANULOCYTES # BLD: 0 10E3/UL
IMM GRANULOCYTES NFR BLD: 0 %
LYMPHOCYTES # BLD AUTO: 2.2 10E3/UL (ref 0.8–5.3)
LYMPHOCYTES NFR BLD AUTO: 38 %
MCH RBC QN AUTO: 29.3 PG (ref 26.5–33)
MCHC RBC AUTO-ENTMCNC: 33.8 G/DL (ref 31.5–36.5)
MCV RBC AUTO: 87 FL (ref 78–100)
MONOCYTES # BLD AUTO: 0.6 10E3/UL (ref 0–1.3)
MONOCYTES NFR BLD AUTO: 10 %
NEUTROPHILS # BLD AUTO: 3 10E3/UL (ref 1.6–8.3)
NEUTROPHILS NFR BLD AUTO: 50 %
PLATELET # BLD AUTO: 189 10E3/UL (ref 150–450)
POTASSIUM BLD-SCNC: 3.6 MMOL/L (ref 3.4–5.3)
RBC # BLD AUTO: 4.91 10E6/UL (ref 4.4–5.9)
TACROLIMUS BLD-MCNC: 11.2 UG/L (ref 5–15)
TME LAST DOSE: NORMAL H
TME LAST DOSE: NORMAL H
WBC # BLD AUTO: 5.9 10E3/UL (ref 4–11)

## 2022-06-17 PROCEDURE — 82947 ASSAY GLUCOSE BLOOD QUANT: CPT

## 2022-06-17 PROCEDURE — 84132 ASSAY OF SERUM POTASSIUM: CPT

## 2022-06-17 PROCEDURE — 82565 ASSAY OF CREATININE: CPT

## 2022-06-17 PROCEDURE — 36415 COLL VENOUS BLD VENIPUNCTURE: CPT

## 2022-06-17 PROCEDURE — 85025 COMPLETE CBC W/AUTO DIFF WBC: CPT

## 2022-06-17 PROCEDURE — 80197 ASSAY OF TACROLIMUS: CPT

## 2022-10-26 ENCOUNTER — LAB (OUTPATIENT)
Dept: LAB | Facility: CLINIC | Age: 57
End: 2022-10-26
Payer: COMMERCIAL

## 2022-10-26 DIAGNOSIS — Z94.0 KIDNEY REPLACED BY TRANSPLANT: ICD-10-CM

## 2022-10-26 DIAGNOSIS — Z79.899 ENCOUNTER FOR LONG-TERM (CURRENT) USE OF MEDICATIONS: ICD-10-CM

## 2022-10-26 DIAGNOSIS — D84.9 IMMUNODEFICIENCY (H): ICD-10-CM

## 2022-10-26 LAB
BASOPHILS # BLD AUTO: 0 10E3/UL (ref 0–0.2)
BASOPHILS NFR BLD AUTO: 0 %
CREAT SERPL-MCNC: 1.14 MG/DL (ref 0.66–1.25)
EOSINOPHIL # BLD AUTO: 0.1 10E3/UL (ref 0–0.7)
EOSINOPHIL NFR BLD AUTO: 2 %
ERYTHROCYTE [DISTWIDTH] IN BLOOD BY AUTOMATED COUNT: 12.9 % (ref 10–15)
FASTING STATUS PATIENT QL REPORTED: YES
GFR SERPL CREATININE-BSD FRML MDRD: 74 ML/MIN/1.73M2
GLUCOSE BLD-MCNC: 142 MG/DL (ref 70–99)
HCT VFR BLD AUTO: 43.6 % (ref 40–53)
HGB BLD-MCNC: 14.6 G/DL (ref 13.3–17.7)
IMM GRANULOCYTES # BLD: 0 10E3/UL
IMM GRANULOCYTES NFR BLD: 0 %
LYMPHOCYTES # BLD AUTO: 1.8 10E3/UL (ref 0.8–5.3)
LYMPHOCYTES NFR BLD AUTO: 37 %
MCH RBC QN AUTO: 29.6 PG (ref 26.5–33)
MCHC RBC AUTO-ENTMCNC: 33.5 G/DL (ref 31.5–36.5)
MCV RBC AUTO: 88 FL (ref 78–100)
MONOCYTES # BLD AUTO: 0.4 10E3/UL (ref 0–1.3)
MONOCYTES NFR BLD AUTO: 8 %
NEUTROPHILS # BLD AUTO: 2.5 10E3/UL (ref 1.6–8.3)
NEUTROPHILS NFR BLD AUTO: 52 %
PLATELET # BLD AUTO: 178 10E3/UL (ref 150–450)
POTASSIUM BLD-SCNC: 3.6 MMOL/L (ref 3.4–5.3)
RBC # BLD AUTO: 4.93 10E6/UL (ref 4.4–5.9)
TACROLIMUS BLD-MCNC: 8.9 UG/L (ref 5–15)
TME LAST DOSE: NORMAL H
TME LAST DOSE: NORMAL H
WBC # BLD AUTO: 4.9 10E3/UL (ref 4–11)

## 2022-10-26 PROCEDURE — 36415 COLL VENOUS BLD VENIPUNCTURE: CPT

## 2022-10-26 PROCEDURE — 82947 ASSAY GLUCOSE BLOOD QUANT: CPT

## 2022-10-26 PROCEDURE — 82565 ASSAY OF CREATININE: CPT

## 2022-10-26 PROCEDURE — 84132 ASSAY OF SERUM POTASSIUM: CPT

## 2022-10-26 PROCEDURE — 80197 ASSAY OF TACROLIMUS: CPT

## 2022-10-26 PROCEDURE — 85025 COMPLETE CBC W/AUTO DIFF WBC: CPT

## 2022-11-23 ENCOUNTER — LAB (OUTPATIENT)
Dept: LAB | Facility: CLINIC | Age: 57
End: 2022-11-23
Payer: COMMERCIAL

## 2022-11-23 DIAGNOSIS — D84.9 IMMUNODEFICIENCY (H): ICD-10-CM

## 2022-11-23 DIAGNOSIS — Z94.0 KIDNEY REPLACED BY TRANSPLANT: ICD-10-CM

## 2022-11-23 DIAGNOSIS — Z79.899 ENCOUNTER FOR LONG-TERM (CURRENT) USE OF MEDICATIONS: ICD-10-CM

## 2022-11-23 LAB
BASOPHILS # BLD AUTO: 0 10E3/UL (ref 0–0.2)
BASOPHILS NFR BLD AUTO: 0 %
CREAT SERPL-MCNC: 1.32 MG/DL (ref 0.67–1.17)
EOSINOPHIL # BLD AUTO: 0.1 10E3/UL (ref 0–0.7)
EOSINOPHIL NFR BLD AUTO: 2 %
ERYTHROCYTE [DISTWIDTH] IN BLOOD BY AUTOMATED COUNT: 12.5 % (ref 10–15)
FASTING STATUS PATIENT QL REPORTED: YES
GFR SERPL CREATININE-BSD FRML MDRD: 62 ML/MIN/1.73M2
GLUCOSE SERPL-MCNC: 115 MG/DL (ref 70–99)
HCT VFR BLD AUTO: 42.9 % (ref 40–53)
HGB BLD-MCNC: 14.5 G/DL (ref 13.3–17.7)
IMM GRANULOCYTES # BLD: 0 10E3/UL
IMM GRANULOCYTES NFR BLD: 0 %
LYMPHOCYTES # BLD AUTO: 2.1 10E3/UL (ref 0.8–5.3)
LYMPHOCYTES NFR BLD AUTO: 38 %
MCH RBC QN AUTO: 29.6 PG (ref 26.5–33)
MCHC RBC AUTO-ENTMCNC: 33.8 G/DL (ref 31.5–36.5)
MCV RBC AUTO: 88 FL (ref 78–100)
MONOCYTES # BLD AUTO: 0.5 10E3/UL (ref 0–1.3)
MONOCYTES NFR BLD AUTO: 9 %
NEUTROPHILS # BLD AUTO: 2.8 10E3/UL (ref 1.6–8.3)
NEUTROPHILS NFR BLD AUTO: 50 %
PLATELET # BLD AUTO: 202 10E3/UL (ref 150–450)
POTASSIUM SERPL-SCNC: 3.8 MMOL/L (ref 3.4–5.3)
RBC # BLD AUTO: 4.9 10E6/UL (ref 4.4–5.9)
TACROLIMUS BLD-MCNC: 8.4 UG/L (ref 5–15)
TME LAST DOSE: NORMAL H
TME LAST DOSE: NORMAL H
WBC # BLD AUTO: 5.5 10E3/UL (ref 4–11)

## 2022-11-23 PROCEDURE — 80197 ASSAY OF TACROLIMUS: CPT

## 2022-11-23 PROCEDURE — 36415 COLL VENOUS BLD VENIPUNCTURE: CPT

## 2022-11-23 PROCEDURE — 85025 COMPLETE CBC W/AUTO DIFF WBC: CPT

## 2022-11-23 PROCEDURE — 82565 ASSAY OF CREATININE: CPT

## 2022-11-23 PROCEDURE — 82947 ASSAY GLUCOSE BLOOD QUANT: CPT

## 2022-11-23 PROCEDURE — 84132 ASSAY OF SERUM POTASSIUM: CPT

## 2023-01-14 ENCOUNTER — HEALTH MAINTENANCE LETTER (OUTPATIENT)
Age: 58
End: 2023-01-14

## 2023-01-25 DIAGNOSIS — D84.9 IMMUNODEFICIENCY (H): ICD-10-CM

## 2023-01-25 DIAGNOSIS — Z79.899 HIGH RISK MEDICATION USE: ICD-10-CM

## 2023-01-25 DIAGNOSIS — Z94.0 KIDNEY TRANSPLANTED: Primary | ICD-10-CM

## 2023-07-11 NOTE — ED PROVIDER NOTES
"  History     Chief Complaint   Patient presents with     Abdominal Pain     HPI  Cesar Villalobos is a 52 year old male with a history of ESRD, kidney txp (2014) , anemia, and HTN who presents to the ED with abdominal pain. Per review of his chart, he recently had arthroscopic peritoneal catheter repositioning procedure  on 1/5, 3 days ago by Dr. Tay.  The laparoscopic survey did reveal catheter in the right iliac fossa with the appendix is thickened tissue around.  After this procedure, patient had a Steven catheter placed and that was juat removed yesterday.  Patient states he has had his peritoneal catheter since June and states that his current catheter was just repositioned and was not replaced.  He reports that when he went into dialysis on Saturday, 2 days ago, he was able to handle fluid intake, but when he went back in on Sunday to remove the fluid he was unable to tolerate the pain.  He states he is only able to tolerate 1 L normally tolerates 8 L.  Patient states the pain was so excruciating that it \"brought him down to his knees\", so he contacted his surgeon who advised him to come to the ED for further evaluation and treatment.  Patient states draining from the catheter is worse than fluid input.  He does have complaints of some intermittent tenderness on his RLQ abdomen.  He states he took some Percocet without any relief of his symptoms, but he states he has not taken any medication since Friday.  He did take a laxative today and  states he has had 2 BM since the procedure.  He also states he has been voiding urine okay.  He otherwise currently denies any fevers, cough, shortness of breath, nausea, or vomiting.    PAST MEDICAL HISTORY  Past Medical History:   Diagnosis Date     Anemia in chronic kidney disease      CKD (chronic kidney disease), stage IV (H)      Dyslipidemia      Hypertension      PAST SURGICAL HISTORY  Past Surgical History:   Procedure Laterality Date     HERNIA REPAIR, INGUINAL " RT/LT Left     as child     LAPAROSCOPIC INSERTION CATHETER PERITONEAL DIALYSIS N/A 6/6/2017    Procedure: LAPAROSCOPIC INSERTION CATHETER PERITONEAL DIALYSIS;  Laparoscopic Peritoneal Dialysis Catheter Placement. ;  Surgeon: Caden Tay MD;  Location: [facility] OR     LAPAROSCOPIC INSERTION CATHETER PERITONEAL DIALYSIS N/A 1/5/2018    Procedure: LAPAROSCOPIC INSERTION CATHETER PERITONEAL DIALYSIS;  Laparoscopic Repositioning of Peritoneal Dialysis Catheter.;  Surgeon: Caden Tay MD;  Location: [facility] OR     FAMILY HISTORY  Family History   Problem Relation Age of Onset     CANCER Brother      KIDNEY DISEASE Brother      due to XRT     HEART DISEASE Sister      SOCIAL HISTORY  Social History   Substance Use Topics     Smoking status: Never Smoker     Smokeless tobacco: Never Used     Alcohol use No     MEDICATIONS  No current facility-administered medications for this encounter.      Current Outpatient Prescriptions   Medication     oxyCODONE IR (ROXICODONE) 5 MG tablet     diltiazem (DILT-XR) 180 MG 24 hr capsule     lisinopril (PRINIVIL/ZESTRIL) 20 MG tablet     sodium bicarbonate 650 MG tablet     atorvastatin (LIPITOR) 10 MG tablet     senna (SENOKOT) 8.6 MG tablet     ondansetron (ZOFRAN) 4 MG tablet     gentamicin (GARAMYCIN) 0.1 % cream     calcium acetate (PHOSLO) 667 MG CAPS capsule     B Complex-C-Folic Acid (DIALYVITE 800) 0.8 MG TABS     sevelamer (RENAGEL) 800 MG tablet     MELATONIN PO     calcium carbonate (TUMS) 500 MG chewable tablet     fish oil-omega-3 fatty acids (FISH OIL) 1000 MG capsule     ALLERGIES  Allergies   Allergen Reactions     Nsaids      Swelling and Hives         I have reviewed the Medications, Allergies, Past Medical and Surgical History, and Social History in the Epic system.    Review of Systems   Constitutional: Negative for fever.   Respiratory: Negative for cough and shortness of breath.    Gastrointestinal: Positive for abdominal pain (RLQ). Negative for nausea and  vomiting.   Genitourinary: Negative for difficulty urinating.   All other systems reviewed and are negative.      Physical Exam   BP: 95/63  Pulse: 79  Heart Rate: 75  Temp: 98.4  F (36.9  C)  Resp: 16  Weight: 96 kg (211 lb 11.2 oz)  SpO2: 98 %      Physical Exam  Gen:A&Ox3, no acute distress  HEENT:PERRL, no facial tenderness or wounds, head atraumatic  Back: no CVA tenderness  CV:RRR without murmurs  PULM:Clear to auscultation bilaterally  Abd:soft, tender in RLQ with guarding.   UE:No traumatic injuries, skin normal  LE:no traumatic injuries, skin normal, no LE edema.   Neuro:CN II-XII intact, strength 5/5 throughout, gait stable.   Skin: no rashes or ecchymoses    ED Course     ED Course     Procedures   3:00 PM  The patient was seen and examined by Dr. Rendon in Room 4.                EKG Interpretation:      Interpreted by Linda Rendon  Time reviewed: 5:22pm  Symptoms at time of EKG: hyperkalemia   Rhythm: normal sinus   Rate: 71  Axis: normal  Ectopy: none  Conduction: normal  ST Segments/ T Waves: tall T wave in V2-V6.  Q Waves: none  Comparison to prior: T waves taller compared with Sept. 18, 2014    Clinical Impression: abnormal EKG        Critical Care time:  was 40 minutes for this patient excluding procedures.      Labs Ordered and Resulted from Time of ED Arrival Up to the Time of Departure from the ED   UA MACROSCOPIC WITH REFLEX TO MICRO AND CULTURE - Abnormal; Notable for the following:        Result Value    Glucose Urine 30 (*)     Blood Urine Trace (*)     Protein Albumin Urine 10 (*)     Bacteria Urine Few (*)     All other components within normal limits   BASIC METABOLIC PANEL - Abnormal; Notable for the following:     Potassium 5.9 (*)     Chloride 111 (*)     Carbon Dioxide 16 (*)     Urea Nitrogen 125 (*)     Creatinine 11.60 (*)     GFR Estimate 5 (*)     GFR Estimate If Black 6 (*)     All other components within normal limits   CBC WITH PLATELETS DIFFERENTIAL - Abnormal; Notable  for the following:     RBC Count 3.09 (*)     Hemoglobin 9.4 (*)     Hematocrit 29.2 (*)     All other components within normal limits   LIPASE - Abnormal; Notable for the following:     Lipase 566 (*)     All other components within normal limits   LACTIC ACID WHOLE BLOOD   POTASSIUM   INR   PARTIAL THROMBOPLASTIN TIME   PERIPHERAL IV CATHETER       Assessments & Plan (with Medical Decision Making)   53 yo M presenting with abdominal pain and inability to perform peritoneal dialysis.   Vitals stable.   Recent laparoscopic surgery for repositioning of PD catheter with catheter found to be curing around the appendix. Pt having RLQ abdominal pain with concern for development of appendicitis.   IV access obtained and lab testing done.   CBC with WBC of 6.6. Hgb 9.4. Plts 218.   BMP with findings of renal failure with Cr 1.16, , K 5.9 (5.8 on repeat). Bicarb 16.   Monitored on tele, mild T wave peaking.   EKG done and showed tall T in V2, no QRS widening.   Sent for CT A/P which shows concern for appendicitis with stranding and dilation. PD catheter continues to be around appendix.   Treated with IV ertapenem.   Transplant surgery and nephrology consulted.   NPO, plan for transplant surgery to remove appendix after treatment of hyperkalemia.   Starting medical potassium shifting with kayexalate, lasix (pt does make urine), sodium bicarbonate, insulin with dextrose. Pt seen by Nephrology fellow in the ED.   Admitted to Transplant Surgery.     I have reviewed the nursing notes.    I have reviewed the findings, diagnosis, plan and need for follow up with the patient.    New Prescriptions    No medications on file       Final diagnoses:   Hyperkalemia   Acute appendicitis with localized peritonitis     IJovanni, am serving as a trained medical scribe to document services personally performed by Linda Rendon MD, based on the provider's statements to me.      ILinda MD, was physically present and  have reviewed and verified the accuracy of this note documented by Jovanni Mosher.     1/8/2018   [facility], [place], EMERGENCY DEPARTMENT    MD ALEJANDRO Pierre Katrina Anne, MD  01/08/18 2014     No

## 2023-09-22 NOTE — ANESTHESIA POSTPROCEDURE EVALUATION
Patient: Cesar Villalobos    Procedure(s):  Laparoscopic Appendectomy  - Wound Class: I-Clean   - Wound Class: I-Clean    Diagnosis:Acute Appendicitis  Diagnosis Additional Information: No value filed.    Anesthesia Type:  General, ETT    Note:  Anesthesia Post Evaluation    Patient location during evaluation: PACU  Patient participation: Able to fully participate in evaluation  Level of consciousness: awake  Pain management: adequate  Airway patency: patent  Cardiovascular status: acceptable  Respiratory status: acceptable  Hydration status: acceptable  PONV: none     Anesthetic complications: None          Last vitals:  Vitals:    01/09/18 1415 01/09/18 1430 01/09/18 1445   BP: 126/85 118/71 121/77   Pulse:      Resp: 11 11 11   Temp: 36.4  C (97.5  F)     SpO2: 97% 97% 97%         Electronically Signed By: Canelo Shaffer MD  January 9, 2018  3:14 PM   Ketoconazole Counseling:   Patient counseled regarding improving absorption with orange juice.  Adverse effects include but are not limited to breast enlargement, headache, diarrhea, nausea, upset stomach, liver function test abnormalities, taste disturbance, and stomach pain.  There is a rare possibility of liver failure that can occur when taking ketoconazole. The patient understands that monitoring of LFTs may be required, especially at baseline. The patient verbalized understanding of the proper use and possible adverse effects of ketoconazole.  All of the patient's questions and concerns were addressed.

## 2023-10-28 ENCOUNTER — LAB (OUTPATIENT)
Dept: LAB | Facility: CLINIC | Age: 58
End: 2023-10-28
Payer: COMMERCIAL

## 2023-10-28 DIAGNOSIS — Z94.0 KIDNEY TRANSPLANTED: ICD-10-CM

## 2023-10-28 DIAGNOSIS — D84.9 IMMUNODEFICIENCY (H): ICD-10-CM

## 2023-10-28 DIAGNOSIS — Z79.899 HIGH RISK MEDICATION USE: ICD-10-CM

## 2023-10-28 LAB
ANION GAP SERPL CALCULATED.3IONS-SCNC: 10 MMOL/L (ref 7–15)
BUN SERPL-MCNC: 25.6 MG/DL (ref 8–23)
CALCIUM SERPL-MCNC: 9.7 MG/DL (ref 8.8–10.2)
CHLORIDE SERPL-SCNC: 104 MMOL/L (ref 98–107)
CREAT SERPL-MCNC: 1.29 MG/DL (ref 0.67–1.17)
DEPRECATED HCO3 PLAS-SCNC: 27 MMOL/L (ref 22–29)
EGFRCR SERPLBLD CKD-EPI 2021: 63 ML/MIN/1.73M2
ERYTHROCYTE [DISTWIDTH] IN BLOOD BY AUTOMATED COUNT: 12.9 % (ref 10–15)
GLUCOSE SERPL-MCNC: 101 MG/DL (ref 70–99)
HCT VFR BLD AUTO: 43.4 % (ref 40–53)
HGB BLD-MCNC: 14.5 G/DL (ref 13.3–17.7)
MCH RBC QN AUTO: 30.3 PG (ref 26.5–33)
MCHC RBC AUTO-ENTMCNC: 33.4 G/DL (ref 31.5–36.5)
MCV RBC AUTO: 91 FL (ref 78–100)
PLATELET # BLD AUTO: 193 10E3/UL (ref 150–450)
POTASSIUM SERPL-SCNC: 4 MMOL/L (ref 3.4–5.3)
RBC # BLD AUTO: 4.78 10E6/UL (ref 4.4–5.9)
SODIUM SERPL-SCNC: 141 MMOL/L (ref 135–145)
WBC # BLD AUTO: 5.3 10E3/UL (ref 4–11)

## 2023-10-28 PROCEDURE — 80197 ASSAY OF TACROLIMUS: CPT

## 2023-10-28 PROCEDURE — 80048 BASIC METABOLIC PNL TOTAL CA: CPT

## 2023-10-28 PROCEDURE — 85027 COMPLETE CBC AUTOMATED: CPT

## 2023-10-28 PROCEDURE — 36415 COLL VENOUS BLD VENIPUNCTURE: CPT

## 2023-10-29 LAB
TACROLIMUS BLD-MCNC: 5.1 UG/L (ref 5–15)
TME LAST DOSE: NORMAL H
TME LAST DOSE: NORMAL H

## 2023-11-28 ENCOUNTER — LAB (OUTPATIENT)
Dept: LAB | Facility: CLINIC | Age: 58
End: 2023-11-28
Payer: COMMERCIAL

## 2023-11-28 DIAGNOSIS — Z94.0 KIDNEY TRANSPLANTED: ICD-10-CM

## 2023-11-28 DIAGNOSIS — D84.9 IMMUNODEFICIENCY (H): ICD-10-CM

## 2023-11-28 DIAGNOSIS — Z79.899 HIGH RISK MEDICATION USE: ICD-10-CM

## 2023-11-28 LAB
ANION GAP SERPL CALCULATED.3IONS-SCNC: 11 MMOL/L (ref 7–15)
BUN SERPL-MCNC: 19.4 MG/DL (ref 8–23)
CALCIUM SERPL-MCNC: 10.1 MG/DL (ref 8.8–10.2)
CHLORIDE SERPL-SCNC: 103 MMOL/L (ref 98–107)
CREAT SERPL-MCNC: 1.27 MG/DL (ref 0.67–1.17)
DEPRECATED HCO3 PLAS-SCNC: 26 MMOL/L (ref 22–29)
EGFRCR SERPLBLD CKD-EPI 2021: 65 ML/MIN/1.73M2
ERYTHROCYTE [DISTWIDTH] IN BLOOD BY AUTOMATED COUNT: 12.9 % (ref 10–15)
GLUCOSE SERPL-MCNC: 100 MG/DL (ref 70–99)
HCT VFR BLD AUTO: 45.5 % (ref 40–53)
HGB BLD-MCNC: 14.8 G/DL (ref 13.3–17.7)
MCH RBC QN AUTO: 29.2 PG (ref 26.5–33)
MCHC RBC AUTO-ENTMCNC: 32.5 G/DL (ref 31.5–36.5)
MCV RBC AUTO: 90 FL (ref 78–100)
PLATELET # BLD AUTO: 209 10E3/UL (ref 150–450)
POTASSIUM SERPL-SCNC: 3.9 MMOL/L (ref 3.4–5.3)
RBC # BLD AUTO: 5.06 10E6/UL (ref 4.4–5.9)
SODIUM SERPL-SCNC: 140 MMOL/L (ref 135–145)
TACROLIMUS BLD-MCNC: 5.7 UG/L (ref 5–15)
TME LAST DOSE: NORMAL H
TME LAST DOSE: NORMAL H
WBC # BLD AUTO: 5.7 10E3/UL (ref 4–11)

## 2023-11-28 PROCEDURE — 36415 COLL VENOUS BLD VENIPUNCTURE: CPT

## 2023-11-28 PROCEDURE — 85027 COMPLETE CBC AUTOMATED: CPT

## 2023-11-28 PROCEDURE — 80197 ASSAY OF TACROLIMUS: CPT

## 2023-11-28 PROCEDURE — 80048 BASIC METABOLIC PNL TOTAL CA: CPT

## 2024-02-11 ENCOUNTER — HEALTH MAINTENANCE LETTER (OUTPATIENT)
Age: 59
End: 2024-02-11

## 2024-09-24 ENCOUNTER — LAB (OUTPATIENT)
Dept: LAB | Facility: CLINIC | Age: 59
End: 2024-09-24
Payer: COMMERCIAL

## 2024-09-24 DIAGNOSIS — D84.9 IMMUNODEFICIENCY (H): ICD-10-CM

## 2024-09-24 DIAGNOSIS — Z79.899 ENCOUNTER FOR LONG-TERM (CURRENT) USE OF MEDICATIONS: ICD-10-CM

## 2024-09-24 DIAGNOSIS — Z79.899 HIGH RISK MEDICATION USE: ICD-10-CM

## 2024-09-24 DIAGNOSIS — Z94.0 KIDNEY TRANSPLANTED: ICD-10-CM

## 2024-09-24 DIAGNOSIS — Z94.0 KIDNEY REPLACED BY TRANSPLANT: ICD-10-CM

## 2024-09-24 LAB
ANION GAP SERPL CALCULATED.3IONS-SCNC: 11 MMOL/L (ref 7–15)
BASOPHILS # BLD AUTO: 0 10E3/UL (ref 0–0.2)
BASOPHILS NFR BLD AUTO: 0 %
BUN SERPL-MCNC: 20.5 MG/DL (ref 8–23)
CALCIUM SERPL-MCNC: 9.6 MG/DL (ref 8.8–10.4)
CHLORIDE SERPL-SCNC: 104 MMOL/L (ref 98–107)
CREAT SERPL-MCNC: 1.21 MG/DL (ref 0.67–1.17)
EGFRCR SERPLBLD CKD-EPI 2021: 68 ML/MIN/1.73M2
EOSINOPHIL # BLD AUTO: 0.2 10E3/UL (ref 0–0.7)
EOSINOPHIL NFR BLD AUTO: 3 %
ERYTHROCYTE [DISTWIDTH] IN BLOOD BY AUTOMATED COUNT: 13 % (ref 10–15)
FASTING STATUS PATIENT QL REPORTED: YES
GLUCOSE SERPL-MCNC: 107 MG/DL (ref 70–99)
GLUCOSE SERPL-MCNC: 107 MG/DL (ref 70–99)
HCO3 SERPL-SCNC: 27 MMOL/L (ref 22–29)
HCT VFR BLD AUTO: 44.9 % (ref 40–53)
HGB BLD-MCNC: 14.4 G/DL (ref 13.3–17.7)
IMM GRANULOCYTES # BLD: 0 10E3/UL
IMM GRANULOCYTES NFR BLD: 0 %
LYMPHOCYTES # BLD AUTO: 2 10E3/UL (ref 0.8–5.3)
LYMPHOCYTES NFR BLD AUTO: 36 %
MCH RBC QN AUTO: 28.9 PG (ref 26.5–33)
MCHC RBC AUTO-ENTMCNC: 32.1 G/DL (ref 31.5–36.5)
MCV RBC AUTO: 90 FL (ref 78–100)
MONOCYTES # BLD AUTO: 0.5 10E3/UL (ref 0–1.3)
MONOCYTES NFR BLD AUTO: 9 %
NEUTROPHILS # BLD AUTO: 2.9 10E3/UL (ref 1.6–8.3)
NEUTROPHILS NFR BLD AUTO: 51 %
PLATELET # BLD AUTO: 186 10E3/UL (ref 150–450)
POTASSIUM SERPL-SCNC: 3.8 MMOL/L (ref 3.4–5.3)
RBC # BLD AUTO: 4.98 10E6/UL (ref 4.4–5.9)
SODIUM SERPL-SCNC: 142 MMOL/L (ref 135–145)
TACROLIMUS BLD-MCNC: 6.1 UG/L (ref 5–15)
TME LAST DOSE: NORMAL H
TME LAST DOSE: NORMAL H
WBC # BLD AUTO: 5.6 10E3/UL (ref 4–11)

## 2024-09-24 PROCEDURE — 85025 COMPLETE CBC W/AUTO DIFF WBC: CPT

## 2024-09-24 PROCEDURE — 80048 BASIC METABOLIC PNL TOTAL CA: CPT

## 2024-09-24 PROCEDURE — 80197 ASSAY OF TACROLIMUS: CPT

## 2024-09-24 PROCEDURE — 82947 ASSAY GLUCOSE BLOOD QUANT: CPT

## 2024-09-24 PROCEDURE — 36415 COLL VENOUS BLD VENIPUNCTURE: CPT

## 2025-02-24 ENCOUNTER — LAB (OUTPATIENT)
Dept: LAB | Facility: CLINIC | Age: 60
End: 2025-02-24
Payer: COMMERCIAL

## 2025-02-24 DIAGNOSIS — Z94.0 KIDNEY TRANSPLANTED: ICD-10-CM

## 2025-02-24 DIAGNOSIS — D84.9 IMMUNODEFICIENCY: ICD-10-CM

## 2025-02-24 DIAGNOSIS — Z79.899 HIGH RISK MEDICATION USE: ICD-10-CM

## 2025-02-24 LAB
ERYTHROCYTE [DISTWIDTH] IN BLOOD BY AUTOMATED COUNT: 12.7 % (ref 10–15)
HCT VFR BLD AUTO: 43.5 % (ref 40–53)
HGB BLD-MCNC: 14.1 G/DL (ref 13.3–17.7)
MCH RBC QN AUTO: 28.9 PG (ref 26.5–33)
MCHC RBC AUTO-ENTMCNC: 32.4 G/DL (ref 31.5–36.5)
MCV RBC AUTO: 89 FL (ref 78–100)
PLATELET # BLD AUTO: 180 10E3/UL (ref 150–450)
RBC # BLD AUTO: 4.88 10E6/UL (ref 4.4–5.9)
TACROLIMUS BLD-MCNC: 5.4 UG/L (ref 5–15)
TME LAST DOSE: NORMAL H
TME LAST DOSE: NORMAL H
WBC # BLD AUTO: 5.3 10E3/UL (ref 4–11)

## 2025-02-24 PROCEDURE — 80048 BASIC METABOLIC PNL TOTAL CA: CPT

## 2025-02-24 PROCEDURE — 85027 COMPLETE CBC AUTOMATED: CPT

## 2025-02-24 PROCEDURE — 36415 COLL VENOUS BLD VENIPUNCTURE: CPT

## 2025-02-24 PROCEDURE — 80197 ASSAY OF TACROLIMUS: CPT

## 2025-02-25 LAB
ANION GAP SERPL CALCULATED.3IONS-SCNC: 12 MMOL/L (ref 7–15)
BUN SERPL-MCNC: 17.2 MG/DL (ref 8–23)
CALCIUM SERPL-MCNC: 9.6 MG/DL (ref 8.8–10.4)
CHLORIDE SERPL-SCNC: 104 MMOL/L (ref 98–107)
CREAT SERPL-MCNC: 1.28 MG/DL (ref 0.67–1.17)
EGFRCR SERPLBLD CKD-EPI 2021: 64 ML/MIN/1.73M2
GLUCOSE SERPL-MCNC: 115 MG/DL (ref 70–99)
HCO3 SERPL-SCNC: 26 MMOL/L (ref 22–29)
POTASSIUM SERPL-SCNC: 4.2 MMOL/L (ref 3.4–5.3)
SODIUM SERPL-SCNC: 142 MMOL/L (ref 135–145)

## 2025-03-08 ENCOUNTER — HEALTH MAINTENANCE LETTER (OUTPATIENT)
Age: 60
End: 2025-03-08

## 2025-04-30 ENCOUNTER — LAB (OUTPATIENT)
Dept: LAB | Facility: CLINIC | Age: 60
End: 2025-04-30
Payer: COMMERCIAL

## 2025-04-30 DIAGNOSIS — Z94.0 TRANSPLANTED KIDNEY: ICD-10-CM

## 2025-04-30 DIAGNOSIS — D84.821 IMMUNODEFICIENCY DUE TO DRUGS: ICD-10-CM

## 2025-04-30 DIAGNOSIS — Z79.899 IMMUNODEFICIENCY DUE TO DRUGS: ICD-10-CM

## 2025-04-30 DIAGNOSIS — Z79.899 HIGH RISK MEDICATION USE: ICD-10-CM

## 2025-04-30 LAB
ANION GAP SERPL CALCULATED.3IONS-SCNC: 10 MMOL/L (ref 7–15)
BASOPHILS # BLD AUTO: 0 10E3/UL (ref 0–0.2)
BASOPHILS NFR BLD AUTO: 0 %
BUN SERPL-MCNC: 21.3 MG/DL (ref 8–23)
CALCIUM SERPL-MCNC: 9.6 MG/DL (ref 8.8–10.4)
CHLORIDE SERPL-SCNC: 105 MMOL/L (ref 98–107)
CREAT SERPL-MCNC: 1.2 MG/DL (ref 0.67–1.17)
EGFRCR SERPLBLD CKD-EPI 2021: 69 ML/MIN/1.73M2
EOSINOPHIL # BLD AUTO: 0.1 10E3/UL (ref 0–0.7)
EOSINOPHIL NFR BLD AUTO: 3 %
ERYTHROCYTE [DISTWIDTH] IN BLOOD BY AUTOMATED COUNT: 12.9 % (ref 10–15)
GLUCOSE SERPL-MCNC: 111 MG/DL (ref 70–99)
HCO3 SERPL-SCNC: 25 MMOL/L (ref 22–29)
HCT VFR BLD AUTO: 45 % (ref 40–53)
HGB BLD-MCNC: 14.6 G/DL (ref 13.3–17.7)
IMM GRANULOCYTES # BLD: 0 10E3/UL
IMM GRANULOCYTES NFR BLD: 0 %
LYMPHOCYTES # BLD AUTO: 1.9 10E3/UL (ref 0.8–5.3)
LYMPHOCYTES NFR BLD AUTO: 36 %
MCH RBC QN AUTO: 29 PG (ref 26.5–33)
MCHC RBC AUTO-ENTMCNC: 32.4 G/DL (ref 31.5–36.5)
MCV RBC AUTO: 90 FL (ref 78–100)
MONOCYTES # BLD AUTO: 0.5 10E3/UL (ref 0–1.3)
MONOCYTES NFR BLD AUTO: 10 %
NEUTROPHILS # BLD AUTO: 2.8 10E3/UL (ref 1.6–8.3)
NEUTROPHILS NFR BLD AUTO: 51 %
PLATELET # BLD AUTO: 197 10E3/UL (ref 150–450)
POTASSIUM SERPL-SCNC: 4 MMOL/L (ref 3.4–5.3)
RBC # BLD AUTO: 5.03 10E6/UL (ref 4.4–5.9)
SODIUM SERPL-SCNC: 140 MMOL/L (ref 135–145)
WBC # BLD AUTO: 5.4 10E3/UL (ref 4–11)

## 2025-04-30 PROCEDURE — 80048 BASIC METABOLIC PNL TOTAL CA: CPT

## 2025-04-30 PROCEDURE — 85025 COMPLETE CBC W/AUTO DIFF WBC: CPT

## 2025-04-30 PROCEDURE — 36415 COLL VENOUS BLD VENIPUNCTURE: CPT

## (undated) DEVICE — STPL ENDO HANDLE GIA ULTRA UNIVERSAL STD EGIAUSTND

## (undated) DEVICE — SU SILK 0 TIE 6X30" A306H

## (undated) DEVICE — SOL WATER IRRIG 1000ML BOTTLE 2F7114

## (undated) DEVICE — Device

## (undated) DEVICE — GLOVE SENSICARE 7.0 MSG1070 LATEX FREE

## (undated) DEVICE — CATH PD MINICAP TRANSFER SET

## (undated) DEVICE — SU MONOCRYL 4-0 PS-2 27" UND Y426H

## (undated) DEVICE — SYR 30ML LL W/O NDL 302832

## (undated) DEVICE — TROCAR FALLOR TUNNELING PERITINEAL DIALYSIS CATH 8888415679

## (undated) DEVICE — DRAPE IOBAN INCISE 23X17" 6650EZ

## (undated) DEVICE — ENDO TROCAR OPTICAL 05MM VERSAPORT PLUS W/FIX CAN ONB5STF

## (undated) DEVICE — NDL INSUFFLATION 120MM VERRES 172015

## (undated) DEVICE — CATH TRAY FOLEY SURESTEP 16FR W/URINE MTR STATLK LF A303416A

## (undated) DEVICE — LINEN TOWEL PACK X5 5464

## (undated) DEVICE — ESU GROUND PAD ADULT W/CORD E7507

## (undated) DEVICE — NDL BLUNT 18GA 1" W/O FILTER 305181

## (undated) DEVICE — SOL NACL 0.9% INJ 1000ML BAG 07983-09

## (undated) DEVICE — ENDO TROCAR OPTICAL 12MM VERSAONE W/STD FIX CAN UNVCA12STF

## (undated) DEVICE — ENDO TROCAR BLADELESS 07-8MM MINISTEP MS101008

## (undated) DEVICE — STPL ENDO RELOAD GIA 45X3.5MM ROTIC 030455

## (undated) DEVICE — CATH TRAY FOLEY SURESTEP 16FR W/URNE MTR STLK LATEX A303316A

## (undated) DEVICE — DRSG BIOPATCH GERMICIDAL SPLIT SPONGE 7MM LG

## (undated) DEVICE — CATH FOLEY 12FR 5ML LATEX 0165SI12

## (undated) DEVICE — CATH FOLEY 20FR 5ML SILVER COAT LATEX 0165SI20

## (undated) DEVICE — CATH PD MINICAP

## (undated) DEVICE — NDL INSUFFLATION 14GA STEP S100000

## (undated) DEVICE — SOL NACL 0.9% IRRIG 1000ML BOTTLE 2F7124

## (undated) DEVICE — GUIDEWIRE SENSOR DUAL FLEX STR 0.035"X150CM M0066703080

## (undated) DEVICE — DEVICE SUTURE GRASPER TROCAR CLOSURE 14GA PMITCSG

## (undated) DEVICE — ENDO CANNULA 05MM VERSAONE UNIVERSAL UNVCA5STF

## (undated) DEVICE — ANTIFOG SOLUTION W/FOAM PAD 31142527

## (undated) DEVICE — DRSG DRAIN 2X2" 7087

## (undated) DEVICE — SU DERMABOND ADVANCED .7ML DNX12

## (undated) DEVICE — SU VICRYL 0 UR-6 27" J603H

## (undated) DEVICE — SYR BULB IRRIG 50ML LATEX FREE 0035280

## (undated) DEVICE — PREP CHLORAPREP 26ML TINTED ORANGE  260815

## (undated) DEVICE — PAD CHUX UNDERPAD 23X24" 7136

## (undated) DEVICE — COVER TRANSDUCER PROBE 7X24" 610-575

## (undated) DEVICE — DRSG GAUZE 4X4" 2187

## (undated) DEVICE — TUBING IRRIG CYSTO/BLADDER SET 81" LF 2C4040

## (undated) DEVICE — STPL SKIN 35W APPOSE 8886803712

## (undated) DEVICE — ESU HARMONIC LAPAROSCOPIC SHEARS HD 1000I 36CM HARHD36

## (undated) DEVICE — LINEN TOWEL PACK X6 WHITE 5487

## (undated) DEVICE — DRSG PRIMAPORE 02X3" 7133

## (undated) DEVICE — DRSG TEGADERM 2 3/8X2 3/4" 1624W

## (undated) DEVICE — SU SILK 1 TIE 6X30" A307H

## (undated) DEVICE — ENDO TROCAR 05MM VERSAONE BLADELESS W/STD FIX CAN NONB5STF

## (undated) DEVICE — LIGHT HANDLE X1 31140133

## (undated) DEVICE — TEST TUBE W/SCREW CAP 17361

## (undated) DEVICE — SUCTION IRR STRYKERFLOW II W/TIP 250-070-520

## (undated) DEVICE — DRSG TEGADERM 4X4 3/4" 1626W

## (undated) DEVICE — SU VICRYL 3-0 SH 27" J316H

## (undated) DEVICE — SUCTION MANIFOLD DORNOCH ULTRA CART UL-CL500

## (undated) DEVICE — STPL SKIN 35W 059037

## (undated) DEVICE — LINEN TOWEL PACK X30 5481

## (undated) DEVICE — SOL ADH LIQUID BENZOIN SWAB 0.6ML C1544

## (undated) DEVICE — ESU PENCIL W/COATED BLADE E2450H

## (undated) DEVICE — GOWN XLG DISP 9545

## (undated) DEVICE — COVER CAMERA IN-LIGHT DISP LT-C02

## (undated) DEVICE — CATH COUDE TIEMAN 16FR LATEX 010116

## (undated) DEVICE — ENDO POUCH GOLD 10MM ECATCH 173050G

## (undated) DEVICE — KIT INTRODUCER FLUENT MICRO 5FRX10CM ECHO TIP KIT-038-04

## (undated) RX ORDER — ONDANSETRON 2 MG/ML
INJECTION INTRAMUSCULAR; INTRAVENOUS
Status: DISPENSED
Start: 2018-03-07

## (undated) RX ORDER — CEFAZOLIN SODIUM 2 G/100ML
INJECTION, SOLUTION INTRAVENOUS
Status: DISPENSED
Start: 2018-01-09

## (undated) RX ORDER — ACETAMINOPHEN 325 MG/1
TABLET ORAL
Status: DISPENSED
Start: 2018-03-07

## (undated) RX ORDER — BUPIVACAINE HYDROCHLORIDE 2.5 MG/ML
INJECTION, SOLUTION EPIDURAL; INFILTRATION; INTRACAUDAL
Status: DISPENSED
Start: 2018-01-05

## (undated) RX ORDER — ROCURONIUM BROMIDE 50 MG/5 ML
SYRINGE (ML) INTRAVENOUS
Status: DISPENSED
Start: 2018-03-07

## (undated) RX ORDER — CEFAZOLIN SODIUM 2 G/100ML
INJECTION, SOLUTION INTRAVENOUS
Status: DISPENSED
Start: 2018-01-05

## (undated) RX ORDER — BUPIVACAINE HYDROCHLORIDE 2.5 MG/ML
INJECTION, SOLUTION EPIDURAL; INFILTRATION; INTRACAUDAL
Status: DISPENSED
Start: 2017-06-06

## (undated) RX ORDER — PHENYLEPHRINE HCL IN 0.9% NACL 1 MG/10 ML
SYRINGE (ML) INTRAVENOUS
Status: DISPENSED
Start: 2018-03-07

## (undated) RX ORDER — LIDOCAINE HYDROCHLORIDE 10 MG/ML
INJECTION, SOLUTION EPIDURAL; INFILTRATION; INTRACAUDAL; PERINEURAL
Status: DISPENSED
Start: 2018-01-05

## (undated) RX ORDER — PROPOFOL 10 MG/ML
INJECTION, EMULSION INTRAVENOUS
Status: DISPENSED
Start: 2018-03-07

## (undated) RX ORDER — CEFAZOLIN SODIUM 1 G/3ML
INJECTION, POWDER, FOR SOLUTION INTRAMUSCULAR; INTRAVENOUS
Status: DISPENSED
Start: 2018-01-09

## (undated) RX ORDER — EPHEDRINE SULFATE 50 MG/ML
INJECTION, SOLUTION INTRAMUSCULAR; INTRAVENOUS; SUBCUTANEOUS
Status: DISPENSED
Start: 2018-03-07

## (undated) RX ORDER — FENTANYL CITRATE 50 UG/ML
INJECTION, SOLUTION INTRAMUSCULAR; INTRAVENOUS
Status: DISPENSED
Start: 2018-03-07

## (undated) RX ORDER — FENTANYL CITRATE 50 UG/ML
INJECTION, SOLUTION INTRAMUSCULAR; INTRAVENOUS
Status: DISPENSED
Start: 2018-01-09

## (undated) RX ORDER — PROPOFOL 10 MG/ML
INJECTION, EMULSION INTRAVENOUS
Status: DISPENSED
Start: 2017-06-06

## (undated) RX ORDER — KETOROLAC TROMETHAMINE 30 MG/ML
INJECTION, SOLUTION INTRAMUSCULAR; INTRAVENOUS
Status: DISPENSED
Start: 2018-03-07

## (undated) RX ORDER — FENTANYL CITRATE 50 UG/ML
INJECTION, SOLUTION INTRAMUSCULAR; INTRAVENOUS
Status: DISPENSED
Start: 2018-01-05

## (undated) RX ORDER — LIDOCAINE HYDROCHLORIDE 20 MG/ML
INJECTION, SOLUTION EPIDURAL; INFILTRATION; INTRACAUDAL; PERINEURAL
Status: DISPENSED
Start: 2018-03-07

## (undated) RX ORDER — CEFAZOLIN SODIUM 1 G/3ML
INJECTION, POWDER, FOR SOLUTION INTRAMUSCULAR; INTRAVENOUS
Status: DISPENSED
Start: 2018-03-07

## (undated) RX ORDER — ALBUTEROL SULFATE 0.83 MG/ML
SOLUTION RESPIRATORY (INHALATION)
Status: DISPENSED
Start: 2018-12-20

## (undated) RX ORDER — LABETALOL HYDROCHLORIDE 5 MG/ML
INJECTION, SOLUTION INTRAVENOUS
Status: DISPENSED
Start: 2018-03-07

## (undated) RX ORDER — PHENYLEPHRINE HCL IN 0.9% NACL 1 MG/10 ML
SYRINGE (ML) INTRAVENOUS
Status: DISPENSED
Start: 2017-06-06

## (undated) RX ORDER — PENTAMIDINE ISETHIONATE 300 MG/300MG
INHALANT RESPIRATORY (INHALATION)
Status: DISPENSED
Start: 2018-12-20

## (undated) RX ORDER — LIDOCAINE HYDROCHLORIDE 10 MG/ML
INJECTION, SOLUTION EPIDURAL; INFILTRATION; INTRACAUDAL; PERINEURAL
Status: DISPENSED
Start: 2018-05-09

## (undated) RX ORDER — CIPROFLOXACIN 500 MG/1
TABLET, FILM COATED ORAL
Status: DISPENSED
Start: 2017-06-09

## (undated) RX ORDER — NEOSTIGMINE METHYLSULFATE 1 MG/ML
VIAL (ML) INJECTION
Status: DISPENSED
Start: 2018-03-07

## (undated) RX ORDER — GLYCOPYRROLATE 0.2 MG/ML
INJECTION, SOLUTION INTRAMUSCULAR; INTRAVENOUS
Status: DISPENSED
Start: 2017-06-06

## (undated) RX ORDER — CEFAZOLIN SODIUM 2 G/100ML
INJECTION, SOLUTION INTRAVENOUS
Status: DISPENSED
Start: 2017-06-06

## (undated) RX ORDER — NEOSTIGMINE METHYLSULFATE 5 MG/5 ML
SYRINGE (ML) INTRAVENOUS
Status: DISPENSED
Start: 2017-06-06

## (undated) RX ORDER — FENTANYL CITRATE 50 UG/ML
INJECTION, SOLUTION INTRAMUSCULAR; INTRAVENOUS
Status: DISPENSED
Start: 2017-06-06

## (undated) RX ORDER — ONDANSETRON 2 MG/ML
INJECTION INTRAMUSCULAR; INTRAVENOUS
Status: DISPENSED
Start: 2017-06-06

## (undated) RX ORDER — LIDOCAINE HYDROCHLORIDE 20 MG/ML
INJECTION, SOLUTION EPIDURAL; INFILTRATION; INTRACAUDAL; PERINEURAL
Status: DISPENSED
Start: 2017-06-06